# Patient Record
Sex: FEMALE | Race: WHITE | NOT HISPANIC OR LATINO | Employment: UNEMPLOYED | URBAN - NONMETROPOLITAN AREA
[De-identification: names, ages, dates, MRNs, and addresses within clinical notes are randomized per-mention and may not be internally consistent; named-entity substitution may affect disease eponyms.]

---

## 2017-08-22 ENCOUNTER — OFFICE VISIT (OUTPATIENT)
Dept: FAMILY MEDICINE CLINIC | Facility: CLINIC | Age: 47
End: 2017-08-22

## 2017-08-22 DIAGNOSIS — K21.9 GASTROESOPHAGEAL REFLUX DISEASE WITHOUT ESOPHAGITIS: ICD-10-CM

## 2017-08-22 DIAGNOSIS — M50.30 DDD (DEGENERATIVE DISC DISEASE), CERVICAL: ICD-10-CM

## 2017-08-22 DIAGNOSIS — Z00.00 HEALTHCARE MAINTENANCE: ICD-10-CM

## 2017-08-22 DIAGNOSIS — M77.11 LATERAL EPICONDYLITIS OF RIGHT ELBOW: ICD-10-CM

## 2017-08-22 DIAGNOSIS — J44.9 CHRONIC OBSTRUCTIVE PULMONARY DISEASE, UNSPECIFIED COPD TYPE (HCC): Primary | ICD-10-CM

## 2017-08-22 DIAGNOSIS — F17.200 SMOKER: ICD-10-CM

## 2017-08-22 DIAGNOSIS — E78.2 MIXED HYPERLIPIDEMIA: ICD-10-CM

## 2017-08-22 DIAGNOSIS — F41.9 ANXIETY: ICD-10-CM

## 2017-08-22 PROCEDURE — 99204 OFFICE O/P NEW MOD 45 MIN: CPT | Performed by: GENERAL PRACTICE

## 2017-08-22 RX ORDER — ALBUTEROL SULFATE 2.5 MG/3ML
2.5 SOLUTION RESPIRATORY (INHALATION) EVERY 4 HOURS PRN
Qty: 180 VIAL | Refills: 5 | Status: SHIPPED | OUTPATIENT
Start: 2017-08-22 | End: 2019-03-07 | Stop reason: SDUPTHER

## 2017-08-22 RX ORDER — FENOFIBRATE 160 MG/1
160 TABLET ORAL DAILY
COMMUNITY
End: 2017-08-22 | Stop reason: SDUPTHER

## 2017-08-22 RX ORDER — BUSPIRONE HYDROCHLORIDE 10 MG/1
TABLET ORAL
Refills: 0 | COMMUNITY
Start: 2017-06-30 | End: 2017-08-22 | Stop reason: SDUPTHER

## 2017-08-22 RX ORDER — ESOMEPRAZOLE MAGNESIUM 40 MG/1
40 CAPSULE, DELAYED RELEASE ORAL DAILY
Qty: 30 CAPSULE | Refills: 5 | Status: SHIPPED | OUTPATIENT
Start: 2017-08-22 | End: 2017-10-03

## 2017-08-22 RX ORDER — SIMVASTATIN 40 MG
40 TABLET ORAL NIGHTLY
Qty: 30 TABLET | Refills: 5 | Status: SHIPPED | OUTPATIENT
Start: 2017-08-22 | End: 2017-10-03

## 2017-08-22 RX ORDER — VENLAFAXINE 75 MG/1
75 TABLET ORAL DAILY
COMMUNITY
End: 2017-08-22 | Stop reason: SDUPTHER

## 2017-08-22 RX ORDER — CYCLOBENZAPRINE HCL 10 MG
10 TABLET ORAL 3 TIMES DAILY
Qty: 90 TABLET | Refills: 5 | Status: SHIPPED | OUTPATIENT
Start: 2017-08-22 | End: 2018-02-27 | Stop reason: SDUPTHER

## 2017-08-22 RX ORDER — FENOFIBRATE 160 MG/1
160 TABLET ORAL DAILY
Qty: 30 TABLET | Refills: 5 | Status: SHIPPED | OUTPATIENT
Start: 2017-08-22 | End: 2017-10-03

## 2017-08-22 RX ORDER — VENLAFAXINE 75 MG/1
75 TABLET ORAL DAILY
Qty: 30 TABLET | Refills: 5 | Status: SHIPPED | OUTPATIENT
Start: 2017-08-22 | End: 2017-10-03

## 2017-08-22 RX ORDER — GABAPENTIN 600 MG/1
600 TABLET ORAL 3 TIMES DAILY
COMMUNITY
End: 2017-08-22 | Stop reason: SDUPTHER

## 2017-08-22 RX ORDER — HYDROXYZINE PAMOATE 25 MG/1
25 CAPSULE ORAL 3 TIMES DAILY PRN
COMMUNITY
End: 2017-08-22 | Stop reason: SDUPTHER

## 2017-08-22 RX ORDER — SIMVASTATIN 40 MG
TABLET ORAL
Refills: 0 | COMMUNITY
Start: 2017-06-30 | End: 2017-08-22 | Stop reason: SDUPTHER

## 2017-08-22 RX ORDER — ALBUTEROL SULFATE 90 UG/1
2 AEROSOL, METERED RESPIRATORY (INHALATION) EVERY 4 HOURS PRN
COMMUNITY
End: 2017-08-22 | Stop reason: SDUPTHER

## 2017-08-22 RX ORDER — HYDROXYZINE PAMOATE 25 MG/1
25 CAPSULE ORAL 3 TIMES DAILY PRN
Qty: 90 CAPSULE | Refills: 5 | Status: SHIPPED | OUTPATIENT
Start: 2017-08-22 | End: 2018-02-27 | Stop reason: SDUPTHER

## 2017-08-22 RX ORDER — BUSPIRONE HYDROCHLORIDE 10 MG/1
10 TABLET ORAL 3 TIMES DAILY
Qty: 90 TABLET | Refills: 5 | Status: SHIPPED | OUTPATIENT
Start: 2017-08-22 | End: 2018-02-27 | Stop reason: SDUPTHER

## 2017-08-22 RX ORDER — PANTOPRAZOLE SODIUM 40 MG/1
TABLET, DELAYED RELEASE ORAL
Refills: 1 | COMMUNITY
Start: 2017-06-30 | End: 2017-08-22

## 2017-08-22 RX ORDER — ALBUTEROL SULFATE 90 UG/1
2 AEROSOL, METERED RESPIRATORY (INHALATION) EVERY 4 HOURS PRN
Qty: 18 G | Refills: 5 | Status: SHIPPED | OUTPATIENT
Start: 2017-08-22 | End: 2018-08-29 | Stop reason: SDUPTHER

## 2017-08-22 RX ORDER — CYCLOBENZAPRINE HCL 10 MG
TABLET ORAL
Refills: 0 | COMMUNITY
Start: 2017-06-30 | End: 2017-08-22 | Stop reason: SDUPTHER

## 2017-08-22 RX ORDER — GABAPENTIN 600 MG/1
600 TABLET ORAL 3 TIMES DAILY
Qty: 90 TABLET | Refills: 2 | Status: SHIPPED | OUTPATIENT
Start: 2017-08-22 | End: 2017-11-14 | Stop reason: SDUPTHER

## 2017-08-22 RX ORDER — ALBUTEROL SULFATE 2.5 MG/3ML
2.5 SOLUTION RESPIRATORY (INHALATION) 2 TIMES DAILY
COMMUNITY
End: 2017-08-22 | Stop reason: SDUPTHER

## 2017-08-23 VITALS
DIASTOLIC BLOOD PRESSURE: 70 MMHG | OXYGEN SATURATION: 96 % | SYSTOLIC BLOOD PRESSURE: 120 MMHG | WEIGHT: 196 LBS | BODY MASS INDEX: 33.46 KG/M2 | HEIGHT: 64 IN | RESPIRATION RATE: 12 BRPM | TEMPERATURE: 98.1 F | HEART RATE: 80 BPM

## 2017-08-23 PROBLEM — F17.200 SMOKER: Status: ACTIVE | Noted: 2017-08-23

## 2017-08-23 NOTE — PROGRESS NOTES
Deonte Acosta is a 47 y.o. female.     History of Present Illness     Presents today to establish care.     Right Elbow Pain  She gives a one week history of right elbow pain. She denies any strain or trauma however the pain started after she turned some heavy mattresses. The pain is described as a sharp discomfort over the lateral aspect with use. The pain does not radiate and has been unassociated with any other symptoms. She denies any stiffness or swelling and has had no apparent change in her strength or sensation. She is RHD and gives no history of any previous problems with that joint    Neck Pain  The patient has had chronic neck pain. Symptoms have been present for a number of years and are unchanged. The pain is located in the posterior neck bilaterally and radiates to the left posterior shoulder. The pain is described as sharp and stiffness and occurs intermittently. She rates her pain as moderate. Symptoms are exacerbated by any movement and prolonged posture. Symptoms are improved by gentle exercise/stretches, heat and cyclobenzaprine and gabapentin. She has tingling about the left shoulder associated with the neck pain. She denies any weakness in the right arm, weakness in the left arm, tingling in the right arm, change in bladder function, change in bowel function, urinary incontinence and bowel incontinence. MRI of the cervical spine performed on 6/29/15 was reported as showing DDD and OA    Anxiety  She has a history of the following anxiety symptoms: nervousness, worrying, insomnia, fatigue, feelings of losing control. Onset of symptoms was a number of years ago.  Symptoms have been intermittent since hen. She denies current suicidal and homicidal ideation. Risk factors: negative life event disappearance of her son Treatment has included medication venlafaxine, buspirone and hydroxyzine .   She complains of the following medication side effects: none. She is unconvinced that the  venlafaxine has added any benefit. She was previously tried on bupropion with little effect    COPD  The patient gives a history of SOB, cough and wheezing. She states that the symptoms occur during the day. She reports that her symptoms become worse with activity and exposure to cold air. Her symptoms are reduced with rest and with inhaled inhaled medication. The patient states she also has no other symptoms. She has had multiple admissions to hospital for apparent exacerbations associated with pneumonia. She is following the treatment plan, including medications as directed. She currently smokes approximately 1 packs per day.    Dyslipidemia  Compliance with treatment has been fair. The patient exercises intermittently. She is currently being prescribed the following medication for her dyslipidemia - simvastatin, fenofibrate. Patient denies side effects associated with her medications. She was unable to tolerate generic omega 3 FAs in the past. Her last labs were drawn about 6 months ago.    GERD  Patient complains of heartburn and bitter taste in mouth. Symptoms have been present for a number of years .The patient denies abdominal bloating, dysphagia, hematemesis, melena and unexpected weight loss. Symptoms appear to be worsened by large meals, lying down and fatty foods. Risk factors present for GERD include tobacco abuse, caffeine use and obesity. Risk factors absent for GERD are alcohol use and NSAID use. Studies performed so far include none. Treatments tried so far include proton pump inhibitor: pantoprazole. Results of treatment: some improvement in symptom severity. Previously she was on esomeprazole with near complete control of her symptoms    The following portions of the patient's history were reviewed and updated as appropriate: allergies, current medications, past family history, past medical history, past social history, past surgical history and problem list.    Review of Systems   Constitutional:  Positive for fatigue. Negative for appetite change, chills, fever and unexpected weight change.   HENT: Negative for congestion, ear pain, rhinorrhea, sneezing, sore throat and voice change.    Eyes: Negative for visual disturbance.   Respiratory: Positive for cough and shortness of breath. Negative for wheezing.    Cardiovascular: Negative for chest pain, palpitations and leg swelling.   Gastrointestinal: Negative for abdominal pain, blood in stool, constipation, diarrhea, nausea and vomiting.        Frequent heartburn   Endocrine: Negative for polydipsia.   Genitourinary: Negative for difficulty urinating, dysuria, frequency, hematuria, menstrual problem, pelvic pain, urgency, vaginal bleeding and vaginal discharge.   Musculoskeletal: Positive for arthralgias and neck pain. Negative for back pain, joint swelling and myalgias.   Skin: Negative for color change.   Neurological: Positive for numbness (left lateral neck and posterior shoulder). Negative for tremors, weakness and headaches.   Psychiatric/Behavioral: Positive for sleep disturbance. Negative for dysphoric mood and suicidal ideas. The patient is nervous/anxious.      Objective   Physical Exam   Constitutional: She is oriented to person, place, and time. No distress.   Appeared older then stated age. Bright and in good spirits however. No apparent distress. No pallor, jaundice, diaphoresis, or cyanosis.     HENT:   Head: Atraumatic.   Right Ear: Tympanic membrane, external ear and ear canal normal.   Left Ear: Tympanic membrane, external ear and ear canal normal.   Nose: Nose normal.   Mouth/Throat: Oropharynx is clear and moist. Mucous membranes are not pale and not cyanotic. Abnormal dentition (adenticulate).   Eyes: EOM are normal. Pupils are equal, round, and reactive to light. No scleral icterus.   Neck: No JVD present. Carotid bruit is not present. No tracheal deviation present. No thyromegaly present.   Cardiovascular: Normal rate, regular rhythm,  S1 normal, S2 normal and intact distal pulses.  Exam reveals no gallop, no S3 and no S4.    No murmur heard.  Pulmonary/Chest: No stridor. No respiratory distress. She has decreased breath sounds in the right lower field and the left lower field. She has wheezes (diffuse - mild). She has no rhonchi. She has no rales.   Mild pulmonary hyperinflation   Abdominal: Soft. Normal aorta and bowel sounds are normal. She exhibits no distension, no abdominal bruit and no mass. There is no hepatosplenomegaly. There is no tenderness. No hernia.   Musculoskeletal: She exhibits no deformity.        Right elbow: She exhibits normal range of motion, no swelling and no deformity. Tenderness found. Lateral epicondyle tenderness noted.        Cervical back: She exhibits decreased range of motion and tenderness (bilateral cervical paraspinal muscle tenderness). She exhibits no bony tenderness and no deformity.   Pain with extension and supination of the right wrist against resistance       Vascular Status -  Her exam exhibits right foot vasculature normal. Her exam exhibits no right foot edema. Her exam exhibits left foot vasculature normal. Her exam exhibits no left foot edema.  Lymphadenopathy:        Head (right side): No submandibular adenopathy present.        Head (left side): No submandibular adenopathy present.     She has no cervical adenopathy.   Neurological: She is alert and oriented to person, place, and time. She has normal reflexes. She displays normal reflexes. No cranial nerve deficit. She exhibits normal muscle tone. Coordination normal.   Skin: Skin is warm and dry. No rash noted. She is not diaphoretic. No cyanosis. No pallor. Nails show no clubbing.   Psychiatric: She has a normal mood and affect.     Assessment/Plan   Problems Addressed this Visit        Cardiovascular and Mediastinum    Mixed hyperlipidemia  Encouraged to continue to work on her diet and exercise plan.  Continue current medication for now.    Fasting labs scheduled  Will likely replace simvastatin with atorvastatin and fenofibrate with vascepa at her return    Relevant Medications    fenofibrate 160 MG tablet    simvastatin (ZOCOR) 40 MG tablet    Other Relevant Orders    Comprehensive Metabolic Panel    Lipid Panel    TSH       Respiratory    Chronic obstructive pulmonary disease   Advised of the importance of smoking cessation  Encouraged to remain as active as symptoms allow for    Relevant Medications    aclidinium bromide (TUDORZA PRESSAIR) 400 MCG/ACT aerosol powder  powder for inhalation    albuterol (PROVENTIL) (2.5 MG/3ML) 0.083% nebulizer solution    albuterol (VENTOLIN HFA) 108 (90 Base) MCG/ACT inhaler    mometasone-formoterol (DULERA 200) 200-5 MCG/ACT inhaler       Digestive    Gastroesophageal reflux disease without esophagitis  Symptoms are currently poorly controlled  Advised regarding lifestyle modification including: eating smaller meals, elevation of the head of bed at night, avoidance of caffeine, chocolate, nicotine and peppermint, and avoiding tight fitting clothing.  Will replace pantoprazole with esomeprazole    Relevant Medications    esomeprazole (nexIUM) 40 MG capsule    Other Relevant Orders    CBC & Differential       Musculoskeletal and Integument    Lateral epicondylitis of right elbow  Advised regarding rest and gentle exercises  Encouraged to report if any worse, any new symptoms, or if not resolving over the next month    DDD (degenerative disc disease), cervical  Continue current medication for now  Will discuss a TENS unit at her return and consider a trial of duloxetine in place of venlafaxine    Relevant Medications    cyclobenzaprine (FLEXERIL) 10 MG tablet    gabapentin (NEURONTIN) 600 MG tablet       Other    Anxiety  Significant situational component. Supportive therapy.   Will also consider clomipramine in the evening at her return    Relevant Medications    busPIRone (BUSPAR) 10 MG tablet    hydrOXYzine  (VISTARIL) 25 MG capsule    venlafaxine (EFFEXOR) 75 MG tablet    Other Relevant Orders    TSH    Healthcare maintenance  Due for a Tdap, prevnar, pneumovax, pap, and mammogram. Patient uninterested in any immunizations but willing to proceed with a pap with Mrs Aldana. Will discuss a mammogram at her return    Relevant Orders    Hepatitis C Antibody    Hepatitis B Surface Antigen    Hepatitis B Surface Antibody    Smoker  Brief discussion regarding smoking cessation. Will discuss further at her return

## 2017-09-14 ENCOUNTER — DOCUMENTATION (OUTPATIENT)
Dept: FAMILY MEDICINE CLINIC | Facility: CLINIC | Age: 47
End: 2017-09-14

## 2017-09-21 ENCOUNTER — OFFICE VISIT (OUTPATIENT)
Dept: FAMILY MEDICINE CLINIC | Facility: CLINIC | Age: 47
End: 2017-09-21

## 2017-09-21 VITALS
HEART RATE: 83 BPM | SYSTOLIC BLOOD PRESSURE: 130 MMHG | OXYGEN SATURATION: 95 % | DIASTOLIC BLOOD PRESSURE: 82 MMHG | HEIGHT: 64 IN | WEIGHT: 197 LBS | TEMPERATURE: 96.7 F | BODY MASS INDEX: 33.63 KG/M2

## 2017-09-21 DIAGNOSIS — Z01.419 GYNECOLOGIC EXAM NORMAL: ICD-10-CM

## 2017-09-21 DIAGNOSIS — E78.2 MIXED HYPERLIPIDEMIA: ICD-10-CM

## 2017-09-21 DIAGNOSIS — J44.1 COPD WITH EXACERBATION (HCC): Primary | ICD-10-CM

## 2017-09-21 DIAGNOSIS — K21.9 GASTROESOPHAGEAL REFLUX DISEASE WITHOUT ESOPHAGITIS: ICD-10-CM

## 2017-09-21 DIAGNOSIS — F17.200 TOBACCO USE DISORDER: ICD-10-CM

## 2017-09-21 DIAGNOSIS — F41.9 ANXIETY: ICD-10-CM

## 2017-09-21 LAB
ALBUMIN SERPL-MCNC: 4.1 G/DL (ref 3.5–5)
ALBUMIN/GLOB SERPL: 1.5 G/DL (ref 1.5–2.5)
ALP SERPL-CCNC: 67 U/L (ref 35–104)
ALT SERPL W P-5'-P-CCNC: 24 U/L (ref 10–36)
ANION GAP SERPL CALCULATED.3IONS-SCNC: 4.6 MMOL/L (ref 3.6–11.2)
AST SERPL-CCNC: 19 U/L (ref 10–30)
BASOPHILS # BLD AUTO: 0.02 10*3/MM3 (ref 0–0.3)
BASOPHILS NFR BLD AUTO: 0.3 % (ref 0–2)
BILIRUB SERPL-MCNC: 0.3 MG/DL (ref 0.2–1.8)
BUN BLD-MCNC: 13 MG/DL (ref 7–21)
BUN/CREAT SERPL: 9.8 (ref 7–25)
CALCIUM SPEC-SCNC: 9.2 MG/DL (ref 7.7–10)
CHLORIDE SERPL-SCNC: 108 MMOL/L (ref 99–112)
CHOLEST SERPL-MCNC: 184 MG/DL (ref 0–200)
CO2 SERPL-SCNC: 28.4 MMOL/L (ref 24.3–31.9)
CREAT BLD-MCNC: 1.32 MG/DL (ref 0.43–1.29)
DEPRECATED RDW RBC AUTO: 52.8 FL (ref 37–54)
EOSINOPHIL # BLD AUTO: 0.16 10*3/MM3 (ref 0–0.7)
EOSINOPHIL NFR BLD AUTO: 2.1 % (ref 0–5)
ERYTHROCYTE [DISTWIDTH] IN BLOOD BY AUTOMATED COUNT: 16.4 % (ref 11.5–14.5)
GFR SERPL CREATININE-BSD FRML MDRD: 43 ML/MIN/1.73
GLOBULIN UR ELPH-MCNC: 2.8 GM/DL
GLUCOSE BLD-MCNC: 106 MG/DL (ref 70–110)
HBV SURFACE AB SER RIA-ACNC: REACTIVE
HBV SURFACE AG SERPL QL IA: NORMAL
HCT VFR BLD AUTO: 42.7 % (ref 37–47)
HCV AB SER DONR QL: NORMAL
HDLC SERPL-MCNC: 47 MG/DL (ref 60–100)
HGB BLD-MCNC: 13.9 G/DL (ref 12–16)
IMM GRANULOCYTES # BLD: 0 10*3/MM3 (ref 0–0.03)
IMM GRANULOCYTES NFR BLD: 0 % (ref 0–0.5)
LDLC SERPL CALC-MCNC: 113 MG/DL (ref 0–100)
LDLC/HDLC SERPL: 2.4 {RATIO}
LYMPHOCYTES # BLD AUTO: 3.56 10*3/MM3 (ref 1–3)
LYMPHOCYTES NFR BLD AUTO: 46.7 % (ref 21–51)
MCH RBC QN AUTO: 29.1 PG (ref 27–33)
MCHC RBC AUTO-ENTMCNC: 32.6 G/DL (ref 33–37)
MCV RBC AUTO: 89.3 FL (ref 80–94)
MONOCYTES # BLD AUTO: 0.51 10*3/MM3 (ref 0.1–0.9)
MONOCYTES NFR BLD AUTO: 6.7 % (ref 0–10)
NEUTROPHILS # BLD AUTO: 3.38 10*3/MM3 (ref 1.4–6.5)
NEUTROPHILS NFR BLD AUTO: 44.2 % (ref 30–70)
OSMOLALITY SERPL CALC.SUM OF ELEC: 281.8 MOSM/KG (ref 273–305)
PLATELET # BLD AUTO: 344 10*3/MM3 (ref 130–400)
PMV BLD AUTO: 11.4 FL (ref 6–10)
POTASSIUM BLD-SCNC: 4 MMOL/L (ref 3.5–5.3)
PROT SERPL-MCNC: 6.9 G/DL (ref 6–8)
RBC # BLD AUTO: 4.78 10*6/MM3 (ref 4.2–5.4)
SODIUM BLD-SCNC: 141 MMOL/L (ref 135–153)
TRIGL SERPL-MCNC: 122 MG/DL (ref 0–150)
TSH SERPL DL<=0.05 MIU/L-ACNC: 3.63 MIU/ML (ref 0.55–4.78)
VLDLC SERPL-MCNC: 24.4 MG/DL
WBC NRBC COR # BLD: 7.63 10*3/MM3 (ref 4.5–12.5)

## 2017-09-21 PROCEDURE — 36415 COLL VENOUS BLD VENIPUNCTURE: CPT | Performed by: GENERAL PRACTICE

## 2017-09-21 PROCEDURE — 87340 HEPATITIS B SURFACE AG IA: CPT | Performed by: GENERAL PRACTICE

## 2017-09-21 PROCEDURE — 86803 HEPATITIS C AB TEST: CPT | Performed by: GENERAL PRACTICE

## 2017-09-21 PROCEDURE — 85025 COMPLETE CBC W/AUTO DIFF WBC: CPT | Performed by: GENERAL PRACTICE

## 2017-09-21 PROCEDURE — 99406 BEHAV CHNG SMOKING 3-10 MIN: CPT | Performed by: PHYSICIAN ASSISTANT

## 2017-09-21 PROCEDURE — 80053 COMPREHEN METABOLIC PANEL: CPT | Performed by: GENERAL PRACTICE

## 2017-09-21 PROCEDURE — 99214 OFFICE O/P EST MOD 30 MIN: CPT | Performed by: PHYSICIAN ASSISTANT

## 2017-09-21 PROCEDURE — 86706 HEP B SURFACE ANTIBODY: CPT | Performed by: GENERAL PRACTICE

## 2017-09-21 PROCEDURE — 84443 ASSAY THYROID STIM HORMONE: CPT | Performed by: GENERAL PRACTICE

## 2017-09-21 PROCEDURE — 80061 LIPID PANEL: CPT | Performed by: GENERAL PRACTICE

## 2017-09-21 RX ORDER — PREDNISONE 20 MG/1
TABLET ORAL
Qty: 14 TABLET | Refills: 0 | Status: SHIPPED | OUTPATIENT
Start: 2017-09-21 | End: 2017-10-01

## 2017-09-21 RX ORDER — DOXYCYCLINE HYCLATE 100 MG/1
100 TABLET, DELAYED RELEASE ORAL 2 TIMES DAILY
Qty: 20 TABLET | Refills: 0 | Status: SHIPPED | OUTPATIENT
Start: 2017-09-21 | End: 2017-10-03

## 2017-09-21 NOTE — PROGRESS NOTES
Deonte Acosta is a 47 y.o. female.     History of Present Illness     Acute illness-  She complains of chest congestion, productive cough with thick yellow sputum and sore throat for the past 3 days.  Minimal relief with over-the-counter cough syrup.    COPD-not at goal due to acute exacerbation.    Tobacco use disorder-she smokes 1/2-1 pack per day for 30+ years.  She is not interested in smoking cessation at this time.    Gastroesophageal reflux disease-not at goal but she is currently trying to get Nexium prior authorize    Dyslipidemia  Compliance with treatment has been good. The patient exercises intermittently. She is currently being prescribed the following medication for her dyslipidemia - simvastatin. Patient denies side effects associated with her medications. Most recent lipids include  No results found for: TRIG, HDL, LDLCALC, LDL  Stable    Anxiety-stable    GYN exam-  She is also here today for her Pap smear and annual breast exam.  She states that she does do monthly self breast exams at home and has not found any masses or irregularities to report.  She states that her last Pap smear was over 20 years ago.    The following portions of the patient's history were reviewed and updated as appropriate: allergies, current medications, past family history, past medical history, past social history, past surgical history and problem list.    Review of Systems   Constitutional: Positive for fatigue. Negative for activity change, appetite change and fever.   HENT: Positive for congestion. Negative for ear pain, sinus pressure and sore throat.    Eyes: Negative for pain and visual disturbance.   Respiratory: Positive for cough and wheezing. Negative for chest tightness.    Cardiovascular: Negative for chest pain and palpitations.   Gastrointestinal: Negative for abdominal pain, constipation, diarrhea, nausea and vomiting.   Endocrine: Negative for polydipsia and polyuria.   Genitourinary:  Negative for dysuria and frequency.   Musculoskeletal: Negative for back pain and myalgias.   Skin: Negative for color change and rash.   Allergic/Immunologic: Negative for food allergies and immunocompromised state.   Neurological: Negative for dizziness, syncope and headaches.   Hematological: Negative for adenopathy. Does not bruise/bleed easily.   Psychiatric/Behavioral: Negative for hallucinations and suicidal ideas. The patient is not nervous/anxious.        Objective   Physical Exam   Constitutional: She is oriented to person, place, and time. She appears well-developed and well-nourished.   HENT:   Head: Normocephalic and atraumatic.   Nose: Nose normal.   Mouth/Throat: Oropharynx is clear and moist.   Eyes: Conjunctivae and EOM are normal. Pupils are equal, round, and reactive to light.   Neck: Normal range of motion. Neck supple. No tracheal deviation present. No thyromegaly present.   Cardiovascular: Normal rate, regular rhythm, normal heart sounds and intact distal pulses.    No murmur heard.  Pulmonary/Chest: Effort normal. No respiratory distress. She has wheezes (bilaterally diffuse). Right breast exhibits no inverted nipple, no mass, no nipple discharge and no tenderness. Left breast exhibits no inverted nipple, no mass, no nipple discharge and no tenderness. There is no breast swelling.   Abdominal: Soft. Bowel sounds are normal. There is no tenderness. There is no guarding.   Genitourinary: Rectum normal and vagina normal. Pelvic exam was performed with patient supine. There is no rash, tenderness or lesion on the right labia. There is no rash, tenderness or lesion on the left labia. Uterus is deviated (retrograde position). Cervix exhibits no motion tenderness and no discharge. Right adnexum displays no mass and no tenderness. Left adnexum displays no mass and no tenderness. No erythema or tenderness in the vagina. No vaginal discharge found.   Musculoskeletal: Normal range of motion. She exhibits  no edema or tenderness.   Lymphadenopathy:     She has no cervical adenopathy.   Neurological: She is alert and oriented to person, place, and time.   Skin: Skin is warm and dry. No rash noted.   Psychiatric: She has a normal mood and affect. Her behavior is normal.   Nursing note and vitals reviewed.      Assessment/Plan   Diagnoses and all orders for this visit:    COPD with exacerbation  -     doxycycline (DORYX) 100 MG enteric coated tablet; Take 1 tablet by mouth 2 (Two) Times a Day.  -     predniSONE (DELTASONE) 20 MG tablet; 2 daily    Gastroesophageal reflux disease without esophagitis  -     CBC & Differential  -     CBC Auto Differential    Mixed hyperlipidemia  -     Comprehensive Metabolic Panel  -     Lipid Panel  -     TSH    Anxiety  -     TSH    Gynecologic exam normal  Comments:  Pap smear and breast exam were performed today.  Pap smear will be sent for pathology.    Tobacco use disorder  Comments:  She was counseled on smoking cessation for 3 minutes today including benefits to her health with regards to COPD and options for cessation.

## 2017-10-03 ENCOUNTER — OFFICE VISIT (OUTPATIENT)
Dept: FAMILY MEDICINE CLINIC | Facility: CLINIC | Age: 47
End: 2017-10-03

## 2017-10-03 DIAGNOSIS — E78.2 MIXED HYPERLIPIDEMIA: Primary | ICD-10-CM

## 2017-10-03 DIAGNOSIS — M50.30 DDD (DEGENERATIVE DISC DISEASE), CERVICAL: ICD-10-CM

## 2017-10-03 DIAGNOSIS — F17.200 SMOKER: ICD-10-CM

## 2017-10-03 DIAGNOSIS — M77.11 LATERAL EPICONDYLITIS OF RIGHT ELBOW: ICD-10-CM

## 2017-10-03 DIAGNOSIS — K21.9 GASTROESOPHAGEAL REFLUX DISEASE WITHOUT ESOPHAGITIS: ICD-10-CM

## 2017-10-03 DIAGNOSIS — Z12.31 ENCOUNTER FOR SCREENING MAMMOGRAM FOR BREAST CANCER: ICD-10-CM

## 2017-10-03 DIAGNOSIS — J44.9 CHRONIC OBSTRUCTIVE PULMONARY DISEASE, UNSPECIFIED COPD TYPE (HCC): ICD-10-CM

## 2017-10-03 DIAGNOSIS — F41.9 CHRONIC ANXIETY: ICD-10-CM

## 2017-10-03 DIAGNOSIS — Z00.00 HEALTHCARE MAINTENANCE: ICD-10-CM

## 2017-10-03 PROCEDURE — 99214 OFFICE O/P EST MOD 30 MIN: CPT | Performed by: GENERAL PRACTICE

## 2017-10-03 RX ORDER — MONTELUKAST SODIUM 10 MG/1
10 TABLET ORAL DAILY
Qty: 30 TABLET | Refills: 5 | Status: SHIPPED | OUTPATIENT
Start: 2017-10-03 | End: 2018-03-28 | Stop reason: SDUPTHER

## 2017-10-03 RX ORDER — OMEGA-3 FATTY ACIDS/FISH OIL 300-1000MG
CAPSULE ORAL
Qty: 120 EACH | Refills: 5 | Status: SHIPPED | OUTPATIENT
Start: 2017-10-03 | End: 2017-11-14 | Stop reason: SDUPTHER

## 2017-10-03 RX ORDER — DULOXETIN HYDROCHLORIDE 30 MG/1
30 CAPSULE, DELAYED RELEASE ORAL DAILY
Qty: 30 CAPSULE | Refills: 5 | Status: SHIPPED | OUTPATIENT
Start: 2017-10-03 | End: 2018-03-28 | Stop reason: SDUPTHER

## 2017-10-03 RX ORDER — LANSOPRAZOLE 30 MG/1
30 CAPSULE, DELAYED RELEASE ORAL DAILY
Qty: 30 CAPSULE | Refills: 5 | Status: SHIPPED | OUTPATIENT
Start: 2017-10-03 | End: 2017-11-14 | Stop reason: SDUPTHER

## 2017-10-03 RX ORDER — SULFAMETHOXAZOLE AND TRIMETHOPRIM 800; 160 MG/1; MG/1
1 TABLET ORAL 2 TIMES DAILY
Qty: 20 TABLET | Refills: 0 | Status: SHIPPED | OUTPATIENT
Start: 2017-10-03 | End: 2017-10-13

## 2017-10-03 RX ORDER — ATORVASTATIN CALCIUM 40 MG/1
40 TABLET, FILM COATED ORAL NIGHTLY
Qty: 30 TABLET | Refills: 5 | Status: SHIPPED | OUTPATIENT
Start: 2017-10-03 | End: 2018-03-28 | Stop reason: SDUPTHER

## 2017-10-03 NOTE — PROGRESS NOTES
Deonte Acosta is a 47 y.o. female.     History of Present Illness     COPD Exacerbation  Current symptoms include acute on chronic dyspnea, cough productive of yellow sputum in moderate amounts and wheezing. Symptoms have been present since 3 weeks ago and have been unchanged. She denies chest pain or hemoptysis. Associated symptoms include itchy runny eyes, sneezing, nasal congestion, and a sore throat. There's no history of any other upper respiratory tract symptoms and she denies any fever or chills.  This episode appears to have been triggered by upper respiratory infection and allergens. Treatments tried for the current exacerbation: couse of doxycycline and prednisone. The patient has been having similar episodes for a number of years. She continues to smoke    GERD  Patient complains of heartburn and bitter taste in mouth. Symptoms have been present for a number of years .The patient denies abdominal bloating, dysphagia, hematemesis, melena and unexpected weight loss. Symptoms appear to be worsened by large meals, lying down and fatty foods. Risk factors present for GERD include tobacco abuse, caffeine use and obesity. Risk factors absent for GERD are alcohol use and NSAID use. Studies performed so far include none. Treatments tried so far include proton pump inhibitor: pantoprazole. Results of treatment: some improvement in symptom severity. Previously she was on esomeprazole with near complete control of her symptoms however her insurance does not cover it    Right Elbow Pain  She continues to have right elbow pain as previously described. She denies any strain or trauma however the pain started after she turned some heavy mattresses. The pain is described as a sharp discomfort over the lateral aspect with use. The pain does not radiate and has been unassociated with any other symptoms. She denies any stiffness or swelling and has had no apparent change in her strength or sensation. She is RHD  and gives no history of any previous problems with that joint    Neck Pain  The patient has had chronic neck pain. Symptoms have been present for a number of years and are unchanged. The pain is located in the posterior neck bilaterally and radiates to the left posterior shoulder. The pain is described as sharp and stiffness and occurs intermittently. She rates her pain as moderate. Symptoms are exacerbated by any movement and prolonged posture. Symptoms are improved by gentle exercise/stretches, heat and cyclobenzaprine and gabapentin. She has tingling about the left shoulder associated with the neck pain. She denies any weakness in the right arm, weakness in the left arm, tingling in the right arm, change in bladder function, change in bowel function, urinary incontinence and bowel incontinence. MRI of the cervical spine performed on 6/29/15 was reported as showing DDD and OA    Anxiety  She has a history of the following anxiety symptoms: nervousness, worrying, insomnia, fatigue, feelings of losing control. Onset of symptoms was a number of years ago.  Symptoms have been intermittent since lthen. She denies current suicidal and homicidal ideation. Risk factors: negative life event disappearance of her son Treatment has included medication venlafaxine, buspirone and hydroxyzine .   She complains of the following medication side effects: none. She is unconvinced that the venlafaxine has added any benefit. She was previously tried on bupropion with little effect    Dyslipidemia  Compliance with treatment has been fair. The patient exercises occasionally. She is currently being prescribed the following medication for her dyslipidemia - simvastatin, fenofibrate. Patient denies side effects associated with her medications. She burped a fishy taste after every dose of omega 3 FAs in the past. Most recent lipids include  Lab Results   Component Value Date    TRIG 122 09/21/2017    HDL 47 (L) 09/21/2017    LDLCALC 113 (H)  09/21/2017     Labs  Most recent Cr 1.32. Hep B S Ab positive however Ag negative. Patient believes she might have been vaccinated in the past    The following portions of the patient's history were reviewed and updated as appropriate: allergies, current medications, past medical history, past social history and problem list.    Review of Systems   Constitutional: Positive for fatigue. Negative for appetite change, chills, fever and unexpected weight change.   HENT: Positive for congestion, rhinorrhea, sneezing and sore throat. Negative for ear pain, postnasal drip and voice change.    Eyes: Positive for itching. Negative for visual disturbance.   Respiratory: Positive for cough, shortness of breath and wheezing.    Cardiovascular: Negative for chest pain, palpitations and leg swelling.   Gastrointestinal: Negative for abdominal pain, blood in stool, constipation, diarrhea, nausea and vomiting.        Frequent heartburn   Endocrine: Negative for polydipsia.   Genitourinary: Negative for difficulty urinating, dysuria, frequency, hematuria, menstrual problem, pelvic pain, urgency, vaginal bleeding and vaginal discharge.   Musculoskeletal: Positive for arthralgias and neck pain. Negative for back pain, joint swelling and myalgias.   Skin: Negative for color change.   Neurological: Positive for numbness (left lateral neck and posterior shoulder). Negative for tremors, weakness and headaches.   Psychiatric/Behavioral: Positive for sleep disturbance. Negative for dysphoric mood and suicidal ideas. The patient is nervous/anxious.      Objective   Physical Exam   Constitutional: She is oriented to person, place, and time. No distress.   Appeared older then stated age. Bright and in good spirits however. No apparent distress. No pallor, jaundice, diaphoresis, or cyanosis.     HENT:   Head: Atraumatic.   Right Ear: Tympanic membrane, external ear and ear canal normal.   Left Ear: Tympanic membrane, external ear and ear canal  normal.   Nose: Nose normal.   Mouth/Throat: Oropharynx is clear and moist. Mucous membranes are not pale and not cyanotic. Abnormal dentition (adenticulate).   Eyes: EOM are normal. Pupils are equal, round, and reactive to light. No scleral icterus.   Neck: No JVD present. Carotid bruit is not present. No tracheal deviation present. No thyromegaly present.   Cardiovascular: Normal rate, regular rhythm, S1 normal, S2 normal and intact distal pulses.  Exam reveals no gallop, no S3 and no S4.    No murmur heard.  Pulmonary/Chest: No stridor. No respiratory distress. She has decreased breath sounds (diffuse). She has wheezes (diffuse - moderate). She has no rhonchi. She has no rales.   Mild pulmonary hyperinflation   Abdominal: Soft. Normal aorta and bowel sounds are normal. She exhibits no distension, no abdominal bruit and no mass. There is no hepatosplenomegaly. There is no tenderness. No hernia.   Musculoskeletal: She exhibits no deformity.        Right elbow: She exhibits normal range of motion, no swelling and no deformity. Tenderness found. Lateral epicondyle tenderness noted.        Cervical back: She exhibits decreased range of motion and tenderness (bilateral cervical paraspinal muscle tenderness). She exhibits no bony tenderness and no deformity.   Pain with extension and supination of the right wrist against resistance       Vascular Status -  Her exam exhibits right foot vasculature normal. Her exam exhibits no right foot edema. Her exam exhibits left foot vasculature normal. Her exam exhibits no left foot edema.  Lymphadenopathy:        Head (right side): No submandibular adenopathy present.        Head (left side): No submandibular adenopathy present.     She has no cervical adenopathy.   Neurological: She is alert and oriented to person, place, and time. She has normal reflexes. She displays normal reflexes. No cranial nerve deficit. She exhibits normal muscle tone. Coordination normal.   Skin: Skin is  warm and dry. No rash noted. She is not diaphoretic. No cyanosis. No pallor. Nails show no clubbing.   Psychiatric: She has a normal mood and affect.     Assessment/Plan   Problems Addressed this Visit        Cardiovascular and Mediastinum    Mixed hyperlipidemia  Encouraged to continue to work on her diet and exercise plan.  Simvastatin and fenofibrate will be replaced with atorvastatin and omega 3 FAs. Advised to take the latter frozen.  Will arrange updated labs at her return    Relevant Medications    aspirin 81 MG tablet    atorvastatin (LIPITOR) 40 MG tablet    Omega 3 1000 MG capsule       Respiratory    Chronic obstructive pulmonary disease  With recent exacerbation  Reminded of the importance of smoking cessation  Will start on montelukast, a long tapering course of prednisone, and a course of bactrim  CXR arranged  Encouraged to report if any worse, any new symptoms, or if not resolving over the next week    Relevant Medications    montelukast (SINGULAIR) 10 MG tablet    sulfamethoxazole-trimethoprim (BACTRIM DS,SEPTRA DS) 800-160 MG per tablet    Other Relevant Orders    XR Chest 2 View       Digestive    Gastroesophageal reflux disease without esophagitis  Symptoms are currently inadequately controlled  Reminded regarding lifestyle modification.  Trial of lansoprazole in place of pantoprazole    Relevant Medications    lansoprazole (PREVACID) 30 MG capsule       Musculoskeletal and Integument    Lateral epicondylitis of right elbow  Reminded regarding rest, gentle exercise, ice and heat.  Prescription written for a splint  Encouraged to report if any worse, any new symptoms, or if not resolving over the next month    DDD (degenerative disc disease), cervical    Relevant Medications    DULoxetine (CYMBALTA) 30 MG capsule       Other    Chronic anxiety  Supportive therapy.   Trial of duloxetine in place of venlafaxine    Relevant Medications    DULoxetine (CYMBALTA) 30 MG capsule    Healthcare  maintenance  Recommended a flu shot when available. Patient remains uninterested in this or in any other immunizations  Will arrange an updated mammogram    Relevant Orders    Mammo Screening Digital Tomosynthesis Bilateral With CAD    Smoker    Encounter for screening mammogram for breast cancer    Relevant Orders    Mammo Screening Digital Tomosynthesis Bilateral With CAD

## 2017-10-04 VITALS
OXYGEN SATURATION: 97 % | HEIGHT: 64 IN | WEIGHT: 202 LBS | SYSTOLIC BLOOD PRESSURE: 125 MMHG | BODY MASS INDEX: 34.49 KG/M2 | RESPIRATION RATE: 12 BRPM | HEART RATE: 84 BPM | TEMPERATURE: 98.3 F | DIASTOLIC BLOOD PRESSURE: 75 MMHG

## 2017-11-14 ENCOUNTER — OFFICE VISIT (OUTPATIENT)
Dept: FAMILY MEDICINE CLINIC | Facility: CLINIC | Age: 47
End: 2017-11-14

## 2017-11-14 DIAGNOSIS — F41.9 CHRONIC ANXIETY: ICD-10-CM

## 2017-11-14 DIAGNOSIS — M50.30 DDD (DEGENERATIVE DISC DISEASE), CERVICAL: ICD-10-CM

## 2017-11-14 DIAGNOSIS — F17.200 SMOKER: ICD-10-CM

## 2017-11-14 DIAGNOSIS — J44.9 CHRONIC OBSTRUCTIVE PULMONARY DISEASE, UNSPECIFIED COPD TYPE (HCC): ICD-10-CM

## 2017-11-14 DIAGNOSIS — Z00.00 HEALTHCARE MAINTENANCE: ICD-10-CM

## 2017-11-14 DIAGNOSIS — K21.9 GASTROESOPHAGEAL REFLUX DISEASE WITHOUT ESOPHAGITIS: ICD-10-CM

## 2017-11-14 DIAGNOSIS — E78.2 MIXED HYPERLIPIDEMIA: Primary | ICD-10-CM

## 2017-11-14 PROCEDURE — 99214 OFFICE O/P EST MOD 30 MIN: CPT | Performed by: GENERAL PRACTICE

## 2017-11-14 RX ORDER — LANSOPRAZOLE 30 MG/1
30 CAPSULE, DELAYED RELEASE ORAL 2 TIMES DAILY
Qty: 60 CAPSULE | Refills: 5 | Status: SHIPPED | OUTPATIENT
Start: 2017-11-14 | End: 2018-09-27 | Stop reason: SDUPTHER

## 2017-11-14 RX ORDER — PREDNISONE 20 MG/1
TABLET ORAL
Qty: 20 TABLET | Refills: 0 | Status: SHIPPED | OUTPATIENT
Start: 2017-11-14 | End: 2017-11-24

## 2017-11-14 RX ORDER — SULFAMETHOXAZOLE AND TRIMETHOPRIM 800; 160 MG/1; MG/1
1 TABLET ORAL 2 TIMES DAILY
Qty: 20 TABLET | Refills: 0 | Status: SHIPPED | OUTPATIENT
Start: 2017-11-14 | End: 2017-11-24

## 2017-11-14 RX ORDER — GABAPENTIN 600 MG/1
600 TABLET ORAL 3 TIMES DAILY
Qty: 90 TABLET | Refills: 2 | Status: SHIPPED | OUTPATIENT
Start: 2017-11-14 | End: 2018-02-12 | Stop reason: SDUPTHER

## 2017-11-14 RX ORDER — OMEGA-3 FATTY ACIDS/FISH OIL 300-1000MG
CAPSULE ORAL
Qty: 120 EACH | Refills: 5 | Status: SHIPPED | OUTPATIENT
Start: 2017-11-14 | End: 2018-08-20

## 2017-11-14 NOTE — PROGRESS NOTES
Deonte Acosta is a 47 y.o. female.     History of Present Illness     COPD Exacerbation  Current symptoms include acute on chronic dyspnea, cough productive of whitish yellow sputum in moderate amounts and wheezing. Symptoms have been present for the last 4-5 days and have been gradually worsening. She denies chest pain or hemoptysis. Associated symptoms include itchy runny eyes, sneezing, nasal congestion, and a sore throat. There's no history of any other upper respiratory tract symptoms and she denies any fever or chills.  This episode appears to have been triggered by upper respiratory infection and allergens. She recently moved to a new home and has been around a lot of dust.The patient has been having similar episodes for a number of years and completed a course of antibiotics and corticosteroids several weeks ago for another episode. She continues to smoke    GERD  Patient complains of heartburn and bitter taste in mouth. Symptoms have been present for a number of years .The patient denies abdominal bloating, dysphagia, hematemesis, melena and unexpected weight loss. Symptoms appear to be worsened by large meals, lying down and fatty foods. Risk factors present for GERD include tobacco abuse, caffeine use and obesity. Risk factors absent for GERD are alcohol use and NSAID use. Studies performed so far include none. Treatments tried so far include proton pump inhibitor: lansoprazole. Results of treatment: good control of her symptoms but only if she takes it twice daily    Neck Pain  The patient has had chronic neck pain. Symptoms have been present for a number of years and are unchanged. The pain is located in the posterior neck bilaterally and radiates to the left posterior shoulder. The pain is described as sharp and stiffness and occurs intermittently. She rates her pain as moderate. Symptoms are exacerbated by any movement and prolonged posture. Symptoms are improved by gentle  exercise/stretches, heat and cyclobenzaprine and gabapentin. She has tingling about the left shoulder associated with the neck pain. She denies any weakness in the right arm, weakness in the left arm, tingling in the right arm, change in bladder function, change in bowel function, urinary incontinence and bowel incontinence. MRI of the cervical spine performed on 6/29/15 was reported as showing DDD and OA    Anxiety  She has a history of the following anxiety symptoms: nervousness, worrying, insomnia, fatigue, feelings of losing control. Onset of symptoms was a number of years ago.  Symptoms have been intermittent since lthen. She denies current suicidal and homicidal ideation. Risk factors: negative life event disappearance of her son Treatment has included medication duloxetine, buspirone and hydroxyzine .   She denies any apparent side effects    Dyslipidemia  Compliance with treatment has been fair. The patient exercises intermittently. She is currently being prescribed the following medication for her dyslipidemia - atorvastatin, omega 3 fatty acids. She has yet to receive the latter from her pharm. Patient denies side effects associated with her medications.     The following portions of the patient's history were reviewed and updated as appropriate: allergies, current medications, past medical history, past social history and problem list.    Review of Systems   Constitutional: Positive for fatigue. Negative for appetite change, chills, fever and unexpected weight change.   HENT: Positive for congestion, rhinorrhea, sneezing and sore throat. Negative for ear pain, postnasal drip and voice change.    Eyes: Negative for itching and visual disturbance.   Respiratory: Positive for cough, shortness of breath and wheezing.    Cardiovascular: Negative for chest pain, palpitations and leg swelling.   Gastrointestinal: Negative for abdominal pain, blood in stool, constipation, diarrhea, nausea and vomiting.         Intermittent heartburn   Endocrine: Negative for polydipsia.   Genitourinary: Negative for difficulty urinating, dysuria, frequency, hematuria, menstrual problem, pelvic pain, urgency, vaginal bleeding and vaginal discharge.   Musculoskeletal: Positive for arthralgias and neck pain. Negative for back pain, joint swelling and myalgias.   Skin: Negative for color change.   Neurological: Positive for numbness (left lateral neck and posterior shoulder). Negative for tremors, weakness and headaches.   Psychiatric/Behavioral: Positive for sleep disturbance. Negative for dysphoric mood and suicidal ideas. The patient is nervous/anxious.      Objective   Physical Exam   Constitutional: She is oriented to person, place, and time. No distress.   Appeared older then stated age. Bright and in good spirits however. No apparent distress. No pallor, jaundice, diaphoresis, or cyanosis.     HENT:   Head: Atraumatic.   Right Ear: Tympanic membrane, external ear and ear canal normal.   Left Ear: Tympanic membrane, external ear and ear canal normal.   Nose: Nose normal.   Mouth/Throat: Oropharynx is clear and moist. Mucous membranes are not pale and not cyanotic. Abnormal dentition (adenticulate).   Eyes: EOM are normal. Pupils are equal, round, and reactive to light. No scleral icterus.   Neck: No JVD present. Carotid bruit is not present. No tracheal deviation present. No thyromegaly present.   Cardiovascular: Normal rate, regular rhythm, S1 normal, S2 normal and intact distal pulses.  Exam reveals no gallop, no S3 and no S4.    No murmur heard.  Pulmonary/Chest: No stridor. No respiratory distress. She has decreased breath sounds (diffuse). She has wheezes (diffuse - moderate). She has no rhonchi. She has no rales.   Mild pulmonary hyperinflation   Abdominal: Soft. Normal aorta and bowel sounds are normal. She exhibits no distension, no abdominal bruit and no mass. There is no hepatosplenomegaly. There is no tenderness. No hernia.    Musculoskeletal: She exhibits no deformity.       Vascular Status -  Her exam exhibits right foot vasculature normal. Her exam exhibits no right foot edema. Her exam exhibits left foot vasculature normal. Her exam exhibits no left foot edema.  Lymphadenopathy:        Head (right side): No submandibular adenopathy present.        Head (left side): No submandibular adenopathy present.     She has no cervical adenopathy.   Neurological: She is alert and oriented to person, place, and time. She has normal reflexes. She displays normal reflexes. No cranial nerve deficit. She exhibits normal muscle tone. Coordination normal.   Skin: Skin is warm and dry. No rash noted. She is not diaphoretic. No cyanosis. No pallor. Nails show no clubbing.   Psychiatric: She has a normal mood and affect.     Assessment/Plan   Problems Addressed this Visit        Cardiovascular and Mediastinum    Mixed hyperlipidemia   Encouraged to continue to work on her diet and exercise plan.  Patient will report if she is unable to start on omega 3 FAs or if she experiences any side effects  Scheduled for updated lab work in several months    Relevant Medications    Omega 3 1000 MG capsule    Other Relevant Orders    Comprehensive Metabolic Panel    Lipid Panel       Respiratory    Chronic obstructive pulmonary disease  With another exacerbation  Lengthy discussion regarding the importance of smoking cessation  Will start back on a course of antibiotics and oral corticosteroids  Encouraged to follow through with the CXR arranged at her last visit  Instructed to report if any worse, any new symptoms, or if not resolving over the next week    Relevant Medications    predniSONE (DELTASONE) 20 MG tablet    sulfamethoxazole-trimethoprim (BACTRIM DS,SEPTRA DS) 800-160 MG per tablet       Digestive    Gastroesophageal reflux disease without esophagitis  Symptoms are unchanged  Reminded regarding lifestyle modification.  Will continue bid lansoprazole     Relevant Medications    lansoprazole (PREVACID) 30 MG capsule    Other Relevant Orders    CBC & Differential       Musculoskeletal and Integument    DDD (degenerative disc disease), cervical    Relevant Medications    gabapentin (NEURONTIN) 600 MG tablet       Other    Chronic anxiety  Stable. Supportive therapy.   Continue current medication.    Healthcare maintenance  Encouraged to follow through with her mammogram and to get a flu shot wherever available    Smoker

## 2017-11-15 VITALS
OXYGEN SATURATION: 96 % | DIASTOLIC BLOOD PRESSURE: 80 MMHG | HEIGHT: 64 IN | BODY MASS INDEX: 35.85 KG/M2 | WEIGHT: 210 LBS | SYSTOLIC BLOOD PRESSURE: 120 MMHG | TEMPERATURE: 98.1 F | RESPIRATION RATE: 12 BRPM | HEART RATE: 100 BPM

## 2018-02-12 ENCOUNTER — OFFICE VISIT (OUTPATIENT)
Dept: FAMILY MEDICINE CLINIC | Facility: CLINIC | Age: 48
End: 2018-02-12

## 2018-02-12 VITALS
DIASTOLIC BLOOD PRESSURE: 70 MMHG | HEART RATE: 100 BPM | WEIGHT: 223 LBS | HEIGHT: 64 IN | BODY MASS INDEX: 38.07 KG/M2 | TEMPERATURE: 98.3 F | OXYGEN SATURATION: 93 % | RESPIRATION RATE: 12 BRPM | SYSTOLIC BLOOD PRESSURE: 125 MMHG

## 2018-02-12 DIAGNOSIS — J44.9 CHRONIC OBSTRUCTIVE PULMONARY DISEASE, UNSPECIFIED COPD TYPE (HCC): ICD-10-CM

## 2018-02-12 DIAGNOSIS — K21.9 GASTROESOPHAGEAL REFLUX DISEASE WITHOUT ESOPHAGITIS: ICD-10-CM

## 2018-02-12 DIAGNOSIS — E78.2 MIXED HYPERLIPIDEMIA: Primary | ICD-10-CM

## 2018-02-12 DIAGNOSIS — F17.200 SMOKER: ICD-10-CM

## 2018-02-12 DIAGNOSIS — J30.89 CHRONIC NON-SEASONAL ALLERGIC RHINITIS, UNSPECIFIED TRIGGER: ICD-10-CM

## 2018-02-12 DIAGNOSIS — Z00.00 HEALTHCARE MAINTENANCE: ICD-10-CM

## 2018-02-12 DIAGNOSIS — F41.9 CHRONIC ANXIETY: ICD-10-CM

## 2018-02-12 DIAGNOSIS — M50.30 DDD (DEGENERATIVE DISC DISEASE), CERVICAL: ICD-10-CM

## 2018-02-12 LAB
ALBUMIN SERPL-MCNC: 4.3 G/DL (ref 3.5–5)
ALBUMIN/GLOB SERPL: 1.5 G/DL (ref 1.5–2.5)
ALP SERPL-CCNC: 107 U/L (ref 35–104)
ALT SERPL W P-5'-P-CCNC: 37 U/L (ref 10–36)
ANION GAP SERPL CALCULATED.3IONS-SCNC: 7.2 MMOL/L (ref 3.6–11.2)
AST SERPL-CCNC: 30 U/L (ref 10–30)
BASOPHILS # BLD AUTO: 0.02 10*3/MM3 (ref 0–0.3)
BASOPHILS NFR BLD AUTO: 0.3 % (ref 0–2)
BILIRUB SERPL-MCNC: 0.5 MG/DL (ref 0.2–1.8)
BUN BLD-MCNC: 9 MG/DL (ref 7–21)
BUN/CREAT SERPL: 9.1 (ref 7–25)
CALCIUM SPEC-SCNC: 9.2 MG/DL (ref 7.7–10)
CHLORIDE SERPL-SCNC: 107 MMOL/L (ref 99–112)
CHOLEST SERPL-MCNC: 172 MG/DL (ref 0–200)
CO2 SERPL-SCNC: 28.8 MMOL/L (ref 24.3–31.9)
CREAT BLD-MCNC: 0.99 MG/DL (ref 0.43–1.29)
DEPRECATED RDW RBC AUTO: 51.9 FL (ref 37–54)
EOSINOPHIL # BLD AUTO: 0.17 10*3/MM3 (ref 0–0.7)
EOSINOPHIL NFR BLD AUTO: 2.5 % (ref 0–5)
ERYTHROCYTE [DISTWIDTH] IN BLOOD BY AUTOMATED COUNT: 16.1 % (ref 11.5–14.5)
GFR SERPL CREATININE-BSD FRML MDRD: 60 ML/MIN/1.73
GLOBULIN UR ELPH-MCNC: 2.9 GM/DL
GLUCOSE BLD-MCNC: 95 MG/DL (ref 70–110)
HCT VFR BLD AUTO: 41.3 % (ref 37–47)
HDLC SERPL-MCNC: 43 MG/DL (ref 60–100)
HGB BLD-MCNC: 13.4 G/DL (ref 12–16)
IMM GRANULOCYTES # BLD: 0.01 10*3/MM3 (ref 0–0.03)
IMM GRANULOCYTES NFR BLD: 0.1 % (ref 0–0.5)
LDLC SERPL CALC-MCNC: 94 MG/DL (ref 0–100)
LDLC/HDLC SERPL: 2.18 {RATIO}
LYMPHOCYTES # BLD AUTO: 2.04 10*3/MM3 (ref 1–3)
LYMPHOCYTES NFR BLD AUTO: 30.4 % (ref 21–51)
MCH RBC QN AUTO: 29 PG (ref 27–33)
MCHC RBC AUTO-ENTMCNC: 32.4 G/DL (ref 33–37)
MCV RBC AUTO: 89.4 FL (ref 80–94)
MONOCYTES # BLD AUTO: 0.69 10*3/MM3 (ref 0.1–0.9)
MONOCYTES NFR BLD AUTO: 10.3 % (ref 0–10)
NEUTROPHILS # BLD AUTO: 3.78 10*3/MM3 (ref 1.4–6.5)
NEUTROPHILS NFR BLD AUTO: 56.4 % (ref 30–70)
OSMOLALITY SERPL CALC.SUM OF ELEC: 283.5 MOSM/KG (ref 273–305)
PLATELET # BLD AUTO: 284 10*3/MM3 (ref 130–400)
PMV BLD AUTO: 11 FL (ref 6–10)
POTASSIUM BLD-SCNC: 4.2 MMOL/L (ref 3.5–5.3)
PROT SERPL-MCNC: 7.2 G/DL (ref 6–8)
RBC # BLD AUTO: 4.62 10*6/MM3 (ref 4.2–5.4)
SODIUM BLD-SCNC: 143 MMOL/L (ref 135–153)
TRIGL SERPL-MCNC: 177 MG/DL (ref 0–150)
VLDLC SERPL-MCNC: 35.4 MG/DL
WBC NRBC COR # BLD: 6.71 10*3/MM3 (ref 4.5–12.5)

## 2018-02-12 PROCEDURE — 99214 OFFICE O/P EST MOD 30 MIN: CPT | Performed by: GENERAL PRACTICE

## 2018-02-12 PROCEDURE — 80061 LIPID PANEL: CPT | Performed by: GENERAL PRACTICE

## 2018-02-12 PROCEDURE — 80053 COMPREHEN METABOLIC PANEL: CPT | Performed by: GENERAL PRACTICE

## 2018-02-12 PROCEDURE — 85025 COMPLETE CBC W/AUTO DIFF WBC: CPT | Performed by: GENERAL PRACTICE

## 2018-02-12 RX ORDER — ACETAMINOPHEN 500 MG
1000 TABLET ORAL EVERY 6 HOURS PRN
Qty: 180 TABLET | Refills: 5 | Status: SHIPPED | OUTPATIENT
Start: 2018-02-12 | End: 2019-03-07 | Stop reason: SDUPTHER

## 2018-02-12 RX ORDER — GABAPENTIN 600 MG/1
600 TABLET ORAL 3 TIMES DAILY
Qty: 90 TABLET | Refills: 2 | Status: SHIPPED | OUTPATIENT
Start: 2018-02-12 | End: 2018-05-11 | Stop reason: SDUPTHER

## 2018-02-12 RX ORDER — SULFAMETHOXAZOLE AND TRIMETHOPRIM 800; 160 MG/1; MG/1
1 TABLET ORAL 2 TIMES DAILY
Qty: 20 TABLET | Refills: 0 | Status: SHIPPED | OUTPATIENT
Start: 2018-02-12 | End: 2018-02-22

## 2018-02-12 RX ORDER — LORATADINE 10 MG/1
10 TABLET ORAL DAILY PRN
Qty: 30 TABLET | Refills: 5 | Status: SHIPPED | OUTPATIENT
Start: 2018-02-12 | End: 2019-03-07 | Stop reason: SDUPTHER

## 2018-02-12 RX ORDER — PREDNISONE 20 MG/1
TABLET ORAL
Qty: 20 TABLET | Refills: 0 | Status: SHIPPED | OUTPATIENT
Start: 2018-02-12 | End: 2018-02-22

## 2018-02-12 NOTE — PROGRESS NOTES
Deonte Acosta is a 47 y.o. female.     History of Present Illness     COPD Exacerbation  Current symptoms include acute on chronic dyspnea, cough productive of whitish roundish sputum in moderate amounts and wheezing. Symptoms have been present for the last week and have been gradually worsening. She denies chest pain or hemoptysis. Associated symptoms include sneezing, nasal congestion, periorbital pressure and a sore throat. There's no history of any other upper respiratory tract symptoms and she denies any fever or chills.  This episode appears to have been triggered by upper respiratory infection and allergens. The patient has been having similar episodes for a number of years and has required a number of hospital admissions in the past. She continues to smoke    GERD  Patient has a history of heartburn and bitter taste in mouth. Symptoms have been present for a number of years .The patient denies abdominal bloating, dysphagia, hematemesis, melena and unexpected weight loss. Symptoms appear to be worsened by large meals, lying down and fatty foods. Risk factors present for GERD include tobacco abuse, caffeine use and obesity. Risk factors absent for GERD are alcohol use and NSAID use. Studies performed so far include none. Treatments tried so far include proton pump inhibitor: lansoprazole. Results of treatment: good control of her symptoms but only if she takes it twice daily    Neck Pain  The patient has had chronic neck pain. Symptoms have been present for a number of years and are unchanged. The pain is located in the posterior neck bilaterally and radiates to the left posterior shoulder. The pain is described as sharp and stiffness and occurs intermittently. She rates her pain as moderate. Symptoms are exacerbated by any movement and prolonged posture. Symptoms are improved by gentle exercise/stretches, heat and cyclobenzaprine and gabapentin. She has tingling about the left shoulder  associated with the neck pain. She denies any weakness in the right arm, weakness in the left arm, tingling in the right arm, change in bladder function, change in bowel function, urinary incontinence and bowel incontinence. MRI of the cervical spine performed on 6/29/15 was reported as showing DDD and OA    Anxiety  She has a history of the following anxiety symptoms: nervousness, worrying, insomnia, fatigue, feelings of losing control. Onset of symptoms was a number of years ago.  Symptoms have been intermittent since lthen. She denies current suicidal and homicidal ideation. Risk factors: negative life event disappearance of her son Treatment has included medication duloxetine, buspirone and hydroxyzine .   She denies any apparent side effects    Dyslipidemia  Compliance with treatment has been fair. The patient exercises intermittently. She is currently being prescribed the following medication for her dyslipidemia - atorvastatin, omega 3 fatty acids. Patient denies side effects associated with her medications.     The following portions of the patient's history were reviewed and updated as appropriate: allergies, current medications, past medical history, past social history and problem list.    Review of Systems   Constitutional: Positive for fatigue. Negative for appetite change, chills, fever and unexpected weight change.   HENT: Positive for congestion, rhinorrhea, sneezing and sore throat. Negative for ear pain, postnasal drip and voice change.    Eyes: Negative for itching and visual disturbance.   Respiratory: Positive for cough, shortness of breath and wheezing.    Cardiovascular: Negative for chest pain, palpitations and leg swelling.   Gastrointestinal: Negative for abdominal pain, blood in stool, constipation, diarrhea, nausea and vomiting.        Occasional heartburn   Endocrine: Negative for polydipsia.   Genitourinary: Negative for difficulty urinating, dysuria, frequency, hematuria, menstrual  problem, pelvic pain, urgency, vaginal bleeding and vaginal discharge.   Musculoskeletal: Positive for arthralgias and neck pain. Negative for back pain, joint swelling and myalgias.   Skin: Negative for color change.   Neurological: Positive for numbness (left lateral neck and posterior shoulder). Negative for tremors, weakness and headaches.   Psychiatric/Behavioral: Positive for sleep disturbance. Negative for dysphoric mood and suicidal ideas. The patient is nervous/anxious.      Objective   Physical Exam   Constitutional: She is oriented to person, place, and time. No distress.   Appeared older then stated age. Bright and in good spirits however. No apparent distress. No pallor, jaundice, diaphoresis, or cyanosis.     HENT:   Head: Atraumatic.   Right Ear: Tympanic membrane, external ear and ear canal normal.   Left Ear: Tympanic membrane, external ear and ear canal normal.   Nose: Nose normal.   Mouth/Throat: Oropharynx is clear and moist. Mucous membranes are not pale and not cyanotic. Abnormal dentition (adenticulate).   Eyes: EOM are normal. Pupils are equal, round, and reactive to light. No scleral icterus.   Neck: No JVD present. Carotid bruit is not present. No tracheal deviation present. No thyromegaly present.   Cardiovascular: Normal rate, regular rhythm, S1 normal, S2 normal and intact distal pulses.  Exam reveals no gallop, no S3 and no S4.    No murmur heard.  Pulmonary/Chest: No stridor. No respiratory distress. She has decreased breath sounds (diffuse). She has wheezes (diffuse - moderate). She has no rhonchi. She has no rales.   Mild pulmonary hyperinflation   Abdominal: Soft. Normal aorta and bowel sounds are normal. She exhibits no distension, no abdominal bruit and no mass. There is no hepatosplenomegaly. There is no tenderness. No hernia.   Musculoskeletal: She exhibits no deformity.       Vascular Status -  Her exam exhibits right foot vasculature normal. Her exam exhibits no right foot  edema. Her exam exhibits left foot vasculature normal. Her exam exhibits no left foot edema.  Lymphadenopathy:        Head (right side): No submandibular adenopathy present.        Head (left side): No submandibular adenopathy present.     She has no cervical adenopathy.   Neurological: She is alert and oriented to person, place, and time. She has normal reflexes. She displays normal reflexes. No cranial nerve deficit. She exhibits normal muscle tone. Coordination normal.   Skin: Skin is warm and dry. No rash noted. She is not diaphoretic. No cyanosis. No pallor. Nails show no clubbing.   Psychiatric: She has a normal mood and affect.     Assessment/Plan   Problems Addressed this Visit        Cardiovascular and Mediastinum    Mixed hyperlipidemia   Encouraged to continue to work on her diet and exercise plan.  Continue current medication  Updated lab work drawn     Relevant Orders    Osmolality, Calculated (Completed)       Respiratory    Chronic obstructive pulmonary disease  COPD is worsening.  Reminded of the importance of smoking cessation  Encouraged to remain as active as symptoms allow for  Empiric antibiotics as per orders along with another course of oral corticosteroids     Relevant Medications    sulfamethoxazole-trimethoprim (BACTRIM DS,SEPTRA DS) 800-160 MG per tablet    predniSONE (DELTASONE) 20 MG tablet    loratadine (CLARITIN) 10 MG tablet    Chronic non-seasonal allergic rhinitis  Reminded regarding allergen avoidance.   Add loratadine    Relevant Medications    loratadine (CLARITIN) 10 MG tablet       Digestive    Gastroesophageal reflux disease without esophagitis  Symptoms are currently improved  Reminded regarding lifestyle modification.  Continue current medication    Relevant Orders    CBC Auto Differential (Completed)       Musculoskeletal and Integument    DDD (degenerative disc disease), cervical  Reminded regarding symptomatic treatment.   Continue current medication    Relevant  Medications    gabapentin (NEURONTIN) 600 MG tablet    acetaminophen (ACETAMINOPHEN EXTRA STRENGTH) 500 MG tablet       Other    Chronic anxiety  Stable.  Supportive therapy.   Continue current medication.    Healthcare maintenance  Encouraged to follow-up with her mammogram  Patient agrees to a pneumovax at her return.    Smoker

## 2018-02-27 DIAGNOSIS — F41.9 ANXIETY: ICD-10-CM

## 2018-02-27 DIAGNOSIS — M50.30 DDD (DEGENERATIVE DISC DISEASE), CERVICAL: ICD-10-CM

## 2018-02-27 RX ORDER — CYCLOBENZAPRINE HCL 10 MG
TABLET ORAL
Qty: 90 TABLET | Refills: 5 | Status: SHIPPED | OUTPATIENT
Start: 2018-02-27 | End: 2018-08-29 | Stop reason: SDUPTHER

## 2018-02-27 RX ORDER — GABAPENTIN 600 MG/1
TABLET ORAL
Qty: 90 TABLET | Refills: 2 | OUTPATIENT
Start: 2018-02-27

## 2018-02-27 RX ORDER — BUSPIRONE HYDROCHLORIDE 10 MG/1
TABLET ORAL
Qty: 90 TABLET | Refills: 5 | Status: SHIPPED | OUTPATIENT
Start: 2018-02-27 | End: 2018-08-29 | Stop reason: SDUPTHER

## 2018-02-27 RX ORDER — HYDROXYZINE PAMOATE 25 MG/1
CAPSULE ORAL
Qty: 90 CAPSULE | Refills: 5 | Status: SHIPPED | OUTPATIENT
Start: 2018-02-27 | End: 2018-08-29 | Stop reason: SDUPTHER

## 2018-03-28 DIAGNOSIS — M50.30 DDD (DEGENERATIVE DISC DISEASE), CERVICAL: ICD-10-CM

## 2018-03-28 DIAGNOSIS — J44.9 CHRONIC OBSTRUCTIVE PULMONARY DISEASE, UNSPECIFIED COPD TYPE (HCC): ICD-10-CM

## 2018-03-28 DIAGNOSIS — F41.9 CHRONIC ANXIETY: ICD-10-CM

## 2018-03-28 DIAGNOSIS — E78.2 MIXED HYPERLIPIDEMIA: ICD-10-CM

## 2018-03-28 RX ORDER — ATORVASTATIN CALCIUM 40 MG/1
40 TABLET, FILM COATED ORAL NIGHTLY
Qty: 30 TABLET | Refills: 5 | Status: SHIPPED | OUTPATIENT
Start: 2018-03-28 | End: 2018-09-27 | Stop reason: SDUPTHER

## 2018-03-28 RX ORDER — DULOXETIN HYDROCHLORIDE 30 MG/1
CAPSULE, DELAYED RELEASE ORAL
Qty: 30 CAPSULE | Refills: 5 | Status: SHIPPED | OUTPATIENT
Start: 2018-03-28 | End: 2018-09-27 | Stop reason: SDUPTHER

## 2018-03-28 RX ORDER — MONTELUKAST SODIUM 10 MG/1
TABLET ORAL
Qty: 30 TABLET | Refills: 5 | Status: SHIPPED | OUTPATIENT
Start: 2018-03-28 | End: 2018-09-27 | Stop reason: SDUPTHER

## 2018-03-28 RX ORDER — ASPIRIN 81 MG/1
TABLET ORAL
Qty: 30 TABLET | Refills: 5 | Status: SHIPPED | OUTPATIENT
Start: 2018-03-28 | End: 2018-09-27 | Stop reason: SDUPTHER

## 2018-03-30 ENCOUNTER — OFFICE VISIT (OUTPATIENT)
Dept: FAMILY MEDICINE CLINIC | Facility: CLINIC | Age: 48
End: 2018-03-30

## 2018-03-30 DIAGNOSIS — F17.200 SMOKER: ICD-10-CM

## 2018-03-30 DIAGNOSIS — J30.89 CHRONIC NON-SEASONAL ALLERGIC RHINITIS, UNSPECIFIED TRIGGER: ICD-10-CM

## 2018-03-30 DIAGNOSIS — K21.9 GASTROESOPHAGEAL REFLUX DISEASE WITHOUT ESOPHAGITIS: ICD-10-CM

## 2018-03-30 DIAGNOSIS — J44.9 CHRONIC OBSTRUCTIVE PULMONARY DISEASE, UNSPECIFIED COPD TYPE (HCC): ICD-10-CM

## 2018-03-30 DIAGNOSIS — E78.2 MIXED HYPERLIPIDEMIA: Primary | ICD-10-CM

## 2018-03-30 PROCEDURE — 99213 OFFICE O/P EST LOW 20 MIN: CPT | Performed by: GENERAL PRACTICE

## 2018-03-30 RX ORDER — LEVOFLOXACIN 500 MG/1
500 TABLET, FILM COATED ORAL DAILY
Qty: 10 TABLET | Refills: 0 | Status: SHIPPED | OUTPATIENT
Start: 2018-03-30 | End: 2018-04-09

## 2018-03-30 RX ORDER — PREDNISONE 20 MG/1
TABLET ORAL
Qty: 20 TABLET | Refills: 0 | Status: SHIPPED | OUTPATIENT
Start: 2018-03-30 | End: 2018-04-09

## 2018-03-31 VITALS
RESPIRATION RATE: 12 BRPM | HEIGHT: 63 IN | BODY MASS INDEX: 39.51 KG/M2 | WEIGHT: 223 LBS | SYSTOLIC BLOOD PRESSURE: 125 MMHG | TEMPERATURE: 98 F | DIASTOLIC BLOOD PRESSURE: 75 MMHG | OXYGEN SATURATION: 98 % | HEART RATE: 96 BPM

## 2018-03-31 NOTE — PROGRESS NOTES
Deonte Acosta is a 47 y.o. female.     History of Present Illness     COPD Exacerbation  She has remained more short of breath since last here with a persistent cough productive of whitish yellowish sputum in moderate amounts and wheezing. Symptoms have been present for the last several months and have been relatively unchanged. She denies any chest pain or hemoptysis. Associated symptoms include sneezing, nasal congestion, periorbital pressure and a sore throat. There's no history of any other upper respiratory tract symptoms and she denies any fever or chills. The patient has been having similar episodes for a number of years and has required a number of hospital admissions in the past. She continues to smoke.    The following portions of the patient's history were reviewed and updated as appropriate: allergies, current medications, past medical history, past social history and problem list.    Review of Systems   Constitutional: Positive for fatigue. Negative for appetite change, chills, fever and unexpected weight change.   HENT: Positive for congestion, rhinorrhea, sneezing and sore throat. Negative for ear pain, postnasal drip and voice change.    Eyes: Negative for itching and visual disturbance.   Respiratory: Positive for cough, shortness of breath and wheezing.    Cardiovascular: Negative for chest pain, palpitations and leg swelling.   Gastrointestinal: Negative for abdominal pain, blood in stool, constipation, diarrhea, nausea and vomiting.        Occasional heartburn   Endocrine: Negative for polydipsia.   Genitourinary: Negative for difficulty urinating, dysuria, frequency, hematuria, menstrual problem, pelvic pain, urgency, vaginal bleeding and vaginal discharge.   Musculoskeletal: Positive for arthralgias and neck pain. Negative for back pain, joint swelling and myalgias.   Skin: Negative for color change.   Neurological: Positive for numbness (left lateral neck and posterior shoulder).  Negative for tremors, weakness and headaches.   Psychiatric/Behavioral: Positive for sleep disturbance. Negative for dysphoric mood and suicidal ideas. The patient is nervous/anxious.      Objective   Physical Exam   Constitutional: She is oriented to person, place, and time. No distress.   Appeared older then stated age. Bright and in good spirits however. No apparent distress. No pallor, jaundice, diaphoresis, or cyanosis.     HENT:   Head: Atraumatic.   Right Ear: Tympanic membrane, external ear and ear canal normal.   Left Ear: Tympanic membrane, external ear and ear canal normal.   Nose: Nose normal.   Mouth/Throat: Oropharynx is clear and moist. Mucous membranes are not pale and not cyanotic. Abnormal dentition (adenticulate).   Eyes: EOM are normal. Pupils are equal, round, and reactive to light. No scleral icterus.   Neck: No JVD present. Carotid bruit is not present. No tracheal deviation present. No thyromegaly present.   Cardiovascular: Normal rate, regular rhythm, S1 normal, S2 normal and intact distal pulses.  Exam reveals no gallop, no S3 and no S4.    No murmur heard.  Pulmonary/Chest: No stridor. No respiratory distress. She has decreased breath sounds (diffuse). She has wheezes (diffuse - moderate). She has no rhonchi. She has no rales.   Mild pulmonary hyperinflation   Abdominal: Soft. Bowel sounds are normal.   Musculoskeletal: She exhibits no deformity.     Vascular Status -  Her right foot exhibits no edema. Her left foot exhibits no edema.  Lymphadenopathy:        Head (right side): No submandibular adenopathy present.        Head (left side): No submandibular adenopathy present.     She has no cervical adenopathy.   Neurological: She is alert and oriented to person, place, and time. No cranial nerve deficit. She exhibits normal muscle tone. Coordination normal.   Skin: Skin is warm and dry. No rash noted. She is not diaphoretic. No cyanosis. No pallor. Nails show no clubbing.   Psychiatric:  She has a normal mood and affect.     Assessment/Plan   Problems Addressed this Visit        Cardiovascular and Mediastinum    Mixed hyperlipidemia   Encouraged to continue to work on her diet and exercise plan.  Continue current medication       Respiratory    Chronic obstructive pulmonary disease  COPD is worsening.  Reminded of the importance of smoking cessation  Encouraged to remain as active as symptoms allow for  Prescription written for a course of levofloxacin and prednisone. Patient will report if she should experience any apparent side effects including any tendon discomfort  Will arrange a CT of the chest  Referral to pulmonology will also be made    Relevant Medications    predniSONE (DELTASONE) 20 MG tablet    levoFLOXacin (LEVAQUIN) 500 MG tablet    Other Relevant Orders    CT Chest With Contrast    Full Pulmonary Function Test With Bronchodilator & ABG    Ambulatory Referral to Pulmonology    Chronic non-seasonal allergic rhinitis       Digestive    Gastroesophageal reflux disease without esophagitis       Other    Smoker

## 2018-05-11 ENCOUNTER — OFFICE VISIT (OUTPATIENT)
Dept: FAMILY MEDICINE CLINIC | Facility: CLINIC | Age: 48
End: 2018-05-11

## 2018-05-11 DIAGNOSIS — Z00.00 HEALTHCARE MAINTENANCE: ICD-10-CM

## 2018-05-11 DIAGNOSIS — E78.2 MIXED HYPERLIPIDEMIA: Primary | ICD-10-CM

## 2018-05-11 DIAGNOSIS — K21.9 GASTROESOPHAGEAL REFLUX DISEASE WITHOUT ESOPHAGITIS: ICD-10-CM

## 2018-05-11 DIAGNOSIS — J30.89 CHRONIC NON-SEASONAL ALLERGIC RHINITIS, UNSPECIFIED TRIGGER: ICD-10-CM

## 2018-05-11 DIAGNOSIS — J44.9 COPD MIXED TYPE (HCC): ICD-10-CM

## 2018-05-11 DIAGNOSIS — F17.200 SMOKER: ICD-10-CM

## 2018-05-11 DIAGNOSIS — F41.9 CHRONIC ANXIETY: ICD-10-CM

## 2018-05-11 DIAGNOSIS — Z23 ENCOUNTER FOR IMMUNIZATION: ICD-10-CM

## 2018-05-11 DIAGNOSIS — M50.30 DDD (DEGENERATIVE DISC DISEASE), CERVICAL: ICD-10-CM

## 2018-05-11 PROCEDURE — 99214 OFFICE O/P EST MOD 30 MIN: CPT | Performed by: GENERAL PRACTICE

## 2018-05-11 PROCEDURE — 90670 PCV13 VACCINE IM: CPT | Performed by: GENERAL PRACTICE

## 2018-05-11 PROCEDURE — 90471 IMMUNIZATION ADMIN: CPT | Performed by: GENERAL PRACTICE

## 2018-05-11 RX ORDER — GABAPENTIN 600 MG/1
600 TABLET ORAL 3 TIMES DAILY
Qty: 90 TABLET | Refills: 2 | Status: SHIPPED | OUTPATIENT
Start: 2018-05-11 | End: 2018-08-20 | Stop reason: SDUPTHER

## 2018-05-11 NOTE — PROGRESS NOTES
Deonte Acosta is a 47 y.o. female.     History of Present Illness     COPD  The patient has a history of SOB, cough and wheezing. She states that the symptoms occur during the day. She reports that her symptoms become worse with activity and exposure to cold air. Her symptoms are reduced with rest and with inhaled inhaled medication. She has had multiple admissions to hospital for apparent exacerbations associated with pneumonia. She is following the treatment plan, including medications as directed. She has been scheduled for a CT of the chest and PFT next week and will undergo a pulmonology assessment afterward. She currently smokes approximately 1 packs per day.    GERD  Patient has a history of heartburn and bitter taste in mouth. Symptoms have been present for a number of years .The patient denies abdominal bloating, dysphagia, hematemesis, melena and unexpected weight loss. Symptoms appear to be worsened by large meals, lying down and fatty foods. Risk factors present for GERD include tobacco abuse, caffeine use and obesity. Risk factors absent for GERD are alcohol use and NSAID use. Studies performed so far include none. Treatments tried so far include proton pump inhibitor: lansoprazole. Results of treatment: good control of her symptoms but only if she takes it twice daily    Neck Pain  The patient has had chronic neck pain. Symptoms have been present for a number of years and are unchanged. The pain is located in the posterior neck bilaterally and radiates to the left posterior shoulder. The pain is described as sharp and stiffness and occurs intermittently. She rates her pain as moderate. Symptoms are exacerbated by any movement and prolonged posture. Symptoms are improved by gentle exercise/stretches, heat and cyclobenzaprine and gabapentin. She has tingling about the left shoulder associated with the neck pain. She denies any weakness in the right arm, weakness in the left arm, tingling in  the right arm, change in bladder function, change in bowel function, urinary incontinence and bowel incontinence. MRI of the cervical spine performed on 6/29/15 was reported as showing DDD and OA    Anxiety  She has a history of the following anxiety symptoms: nervousness, worrying, insomnia, fatigue, feelings of losing control. Onset of symptoms was a number of years ago.  Symptoms have been intermittent since lthen. She denies current suicidal and homicidal ideation. Risk factors: negative life event disappearance of her son Treatment has included medication duloxetine, buspirone and hydroxyzine .   She denies any apparent side effects    Dyslipidemia  Compliance with treatment has been fair. The patient exercises intermittently. She is currently being prescribed the following medication for her dyslipidemia - atorvastatin, omega 3 fatty acids. Patient denies side effects associated with her medications.     The following portions of the patient's history were reviewed and updated as appropriate: allergies, current medications, past medical history, past social history and problem list.    Review of Systems   Constitutional: Positive for fatigue. Negative for appetite change, chills, fever and unexpected weight change.   HENT: Positive for rhinorrhea and sneezing. Negative for congestion, ear pain, postnasal drip, sore throat and voice change.    Eyes: Negative for itching and visual disturbance.   Respiratory: Positive for cough, shortness of breath and wheezing.    Cardiovascular: Negative for chest pain, palpitations and leg swelling.   Gastrointestinal: Negative for abdominal pain, blood in stool, constipation, diarrhea, nausea and vomiting.        Occasional heartburn   Endocrine: Negative for polydipsia.   Genitourinary: Negative for difficulty urinating, dysuria, frequency, hematuria, menstrual problem, pelvic pain, urgency, vaginal bleeding and vaginal discharge.   Musculoskeletal: Positive for arthralgias  and neck pain. Negative for back pain, joint swelling and myalgias.   Skin: Negative for color change.   Neurological: Positive for numbness (left lateral neck and posterior shoulder). Negative for tremors, weakness and headaches.   Psychiatric/Behavioral: Positive for sleep disturbance. Negative for dysphoric mood and suicidal ideas. The patient is nervous/anxious.      Objective   Physical Exam   Constitutional: She is oriented to person, place, and time. No distress.   Appeared older then stated age. Bright and in good spirits however. No apparent distress. No pallor, jaundice, diaphoresis, or cyanosis.     HENT:   Head: Atraumatic.   Right Ear: Tympanic membrane, external ear and ear canal normal.   Left Ear: Tympanic membrane, external ear and ear canal normal.   Nose: Nose normal.   Mouth/Throat: Oropharynx is clear and moist. Mucous membranes are not pale and not cyanotic. Abnormal dentition (adenticulate).   Eyes: EOM are normal. Pupils are equal, round, and reactive to light. No scleral icterus.   Neck: No JVD present. Carotid bruit is not present. No tracheal deviation present. No thyromegaly present.   Cardiovascular: Normal rate, regular rhythm, S1 normal, S2 normal and intact distal pulses.  Exam reveals no gallop, no S3 and no S4.    No murmur heard.  Pulmonary/Chest: No stridor. No respiratory distress. She has decreased breath sounds (diffuse). She has wheezes (diffuse - moderate). She has no rhonchi. She has no rales.   Mild pulmonary hyperinflation   Abdominal: Soft. Bowel sounds are normal.   Musculoskeletal: She exhibits no deformity.     Vascular Status -  Her right foot exhibits no edema. Her left foot exhibits no edema.  Lymphadenopathy:        Head (right side): No submandibular adenopathy present.        Head (left side): No submandibular adenopathy present.     She has no cervical adenopathy.   Neurological: She is alert and oriented to person, place, and time. No cranial nerve deficit. She  exhibits normal muscle tone. Coordination normal.   Skin: Skin is warm and dry. No rash noted. She is not diaphoretic. No cyanosis. No pallor. Nails show no clubbing.   Psychiatric: She has a normal mood and affect.     Assessment/Plan   Problems Addressed this Visit        Cardiovascular and Mediastinum    Mixed hyperlipidemia  Encouraged to continue to work on her diet and exercise plan.  Continue current medication       Respiratory    COPD mixed type  COPD is worsening.  Reminded of the importance of smoking cessation  Encouraged to remain as active as symptoms allow for  Follow up with pulmonology    Chronic non-seasonal allergic rhinitis       Digestive    Gastroesophageal reflux disease without esophagitis       Musculoskeletal and Integument    DDD (degenerative disc disease), cervical  Reminded regarding symptomatic treatment.   Continue current medication    Relevant Medications    gabapentin (NEURONTIN) 600 MG tablet       Other    Chronic anxiety  Stable.  Supportive therapy.   Continue current medication.    Healthcare maintenance  Patient agrees today to a prevnar  Will plan on administering a pneumovax in the fall with a flu shot  Reminded to follow up with her mammogram    Smoker

## 2018-05-12 VITALS
HEART RATE: 100 BPM | TEMPERATURE: 98.6 F | RESPIRATION RATE: 12 BRPM | BODY MASS INDEX: 38.58 KG/M2 | HEIGHT: 64 IN | WEIGHT: 226 LBS | SYSTOLIC BLOOD PRESSURE: 120 MMHG | DIASTOLIC BLOOD PRESSURE: 65 MMHG | OXYGEN SATURATION: 95 %

## 2018-05-12 PROBLEM — M77.11 LATERAL EPICONDYLITIS OF RIGHT ELBOW: Status: RESOLVED | Noted: 2017-08-22 | Resolved: 2018-05-12

## 2018-08-20 ENCOUNTER — OFFICE VISIT (OUTPATIENT)
Dept: FAMILY MEDICINE CLINIC | Facility: CLINIC | Age: 48
End: 2018-08-20

## 2018-08-20 VITALS
OXYGEN SATURATION: 96 % | RESPIRATION RATE: 12 BRPM | SYSTOLIC BLOOD PRESSURE: 125 MMHG | TEMPERATURE: 98.9 F | DIASTOLIC BLOOD PRESSURE: 80 MMHG | BODY MASS INDEX: 38.93 KG/M2 | HEIGHT: 64 IN | WEIGHT: 228 LBS | HEART RATE: 101 BPM

## 2018-08-20 DIAGNOSIS — Z00.00 HEALTHCARE MAINTENANCE: ICD-10-CM

## 2018-08-20 DIAGNOSIS — F17.200 SMOKER: ICD-10-CM

## 2018-08-20 DIAGNOSIS — S83.411A SPRAIN OF MEDIAL COLLATERAL LIGAMENT OF RIGHT KNEE, INITIAL ENCOUNTER: ICD-10-CM

## 2018-08-20 DIAGNOSIS — J30.89 CHRONIC NON-SEASONAL ALLERGIC RHINITIS, UNSPECIFIED TRIGGER: ICD-10-CM

## 2018-08-20 DIAGNOSIS — M50.30 DDD (DEGENERATIVE DISC DISEASE), CERVICAL: ICD-10-CM

## 2018-08-20 DIAGNOSIS — E78.2 MIXED HYPERLIPIDEMIA: Primary | ICD-10-CM

## 2018-08-20 DIAGNOSIS — Z12.31 ENCOUNTER FOR SCREENING MAMMOGRAM FOR BREAST CANCER: ICD-10-CM

## 2018-08-20 DIAGNOSIS — J44.9 COPD MIXED TYPE (HCC): ICD-10-CM

## 2018-08-20 DIAGNOSIS — F41.9 CHRONIC ANXIETY: ICD-10-CM

## 2018-08-20 DIAGNOSIS — K21.9 GASTROESOPHAGEAL REFLUX DISEASE WITHOUT ESOPHAGITIS: ICD-10-CM

## 2018-08-20 PROCEDURE — 99214 OFFICE O/P EST MOD 30 MIN: CPT | Performed by: GENERAL PRACTICE

## 2018-08-20 RX ORDER — GABAPENTIN 600 MG/1
600 TABLET ORAL 3 TIMES DAILY
Qty: 90 TABLET | Refills: 2 | Status: SHIPPED | OUTPATIENT
Start: 2018-08-20 | End: 2018-11-08 | Stop reason: SDUPTHER

## 2018-08-20 NOTE — PROGRESS NOTES
Deonte Acosta is a 48 y.o. female.     History of Present Illness     Right Knee Pain  Returns with a 4 day history of right knee pain.  There is no history of any strain or trauma however the pain has been present since she got caught in a rain shower while walking and had to rush home.  The pain is described as a sharp medial ache with weightbearing.  This does not radiate and apart from mild swelling of that calf has been unassociated with any other symptoms.  There is no history of any stiffness, giving way, or locking.  She denies any previous problems with that joint.    Neck Pain  The patient has had chronic neck pain. Symptoms have been present for a number of years and are unchanged. The pain is located in the posterior neck bilaterally and radiates to the left posterior shoulder. The pain is described as sharp and stiffness and occurs intermittently. She rates her pain as moderate. Symptoms are exacerbated by any movement and prolonged posture. Symptoms are improved by gentle exercise/stretches, heat and cyclobenzaprine and gabapentin. She has tingling about the left shoulder associated with the neck pain. She denies any weakness in the right arm, weakness in the left arm, tingling in the right arm, change in bladder function, change in bowel function, urinary incontinence and bowel incontinence. MRI of the cervical spine performed on 6/29/15 was reported as showing DDD and OA    COPD  The patient has a history of SOB, cough and wheezing. She states that the symptoms occur during the day. She reports that her symptoms become worse with activity and exposure to cold air. Her symptoms are reduced with rest and with inhaled inhaled medication. She has had multiple admissions to hospital for apparent exacerbations associated with pneumonia. She is following the treatment plan, including medications as directed. She missed her appointments for a CT of the chest and PFT and a pulmonology  assessment.  The latter has been rescheduled for later this week.  She currently smokes approximately 1 packs per day.    GERD  Patient has a history of heartburn and bitter taste in mouth. Symptoms have been present for a number of years .The patient denies abdominal bloating, dysphagia, hematemesis, melena and unexpected weight loss. Symptoms appear to be worsened by large meals, lying down and fatty foods. Risk factors present for GERD include tobacco abuse, caffeine use and obesity. Risk factors absent for GERD are alcohol use and NSAID use. Studies performed so far include none. Treatments tried so far include proton pump inhibitor: lansoprazole. Results of treatment: good control of her symptoms but only if she takes it twice daily    Anxiety  She has a history of the following anxiety symptoms: nervousness, worrying, insomnia, fatigue, feelings of losing control. Onset of symptoms was a number of years ago.  Symptoms have been intermittent since lthen. She denies current suicidal and homicidal ideation. Risk factors: negative life event disappearance of her son. Treatment has included medication duloxetine, buspirone and hydroxyzine. She denies any apparent side effects    Dyslipidemia  Compliance with treatment has been fair. The patient exercises intermittently. She is currently being prescribed the following medication for her dyslipidemia - atorvastatin, omega 3 fatty acids. Patient denies side effects associated with her medications.     The following portions of the patient's history were reviewed and updated as appropriate: allergies, current medications, past medical history, past social history and problem list.    Review of Systems   Constitutional: Positive for fatigue. Negative for appetite change, chills, fever and unexpected weight change.   HENT: Positive for rhinorrhea and sneezing. Negative for congestion, ear pain, postnasal drip, sore throat and voice change.    Eyes: Negative for itching  and visual disturbance.   Respiratory: Positive for cough, shortness of breath and wheezing.    Cardiovascular: Negative for chest pain, palpitations and leg swelling.   Gastrointestinal: Negative for abdominal pain, blood in stool, constipation, diarrhea, nausea and vomiting.        Occasional heartburn   Endocrine: Negative for polydipsia.   Genitourinary: Negative for difficulty urinating, dysuria, frequency, hematuria, menstrual problem, pelvic pain, urgency, vaginal bleeding and vaginal discharge.   Musculoskeletal: Positive for arthralgias and neck pain. Negative for back pain, joint swelling and myalgias.   Skin: Negative for color change.   Neurological: Positive for numbness (left lateral neck and posterior shoulder). Negative for tremors, weakness and headaches.   Psychiatric/Behavioral: Positive for sleep disturbance. Negative for dysphoric mood and suicidal ideas. The patient is nervous/anxious.      Objective   Physical Exam   Constitutional: She is oriented to person, place, and time. No distress.   Appeared older then stated age. Bright and in good spirits however. Mild right antalgic gait. No apparent distress. No pallor, jaundice, diaphoresis, or cyanosis.     HENT:   Head: Atraumatic.   Right Ear: Tympanic membrane, external ear and ear canal normal.   Left Ear: Tympanic membrane, external ear and ear canal normal.   Nose: Nose normal.   Mouth/Throat: Oropharynx is clear and moist. Mucous membranes are not pale and not cyanotic. Abnormal dentition (adenticulate).   Eyes: Pupils are equal, round, and reactive to light. EOM are normal. No scleral icterus.   Neck: No JVD present. Carotid bruit is not present. No tracheal deviation present. No thyromegaly present.   Cardiovascular: Normal rate, regular rhythm, S1 normal, S2 normal and intact distal pulses.  Exam reveals no gallop, no S3 and no S4.    No murmur heard.  Pulmonary/Chest: No stridor. No respiratory distress. She has decreased breath sounds  (diffuse). She has wheezes (diffuse - moderate). She has no rhonchi. She has no rales.   Mild pulmonary hyperinflation   Abdominal: Soft. Bowel sounds are normal.   Musculoskeletal: She exhibits no deformity.        Right knee: She exhibits normal range of motion, no effusion, no deformity and no erythema. Tenderness found. Medial joint line tenderness noted.     Vascular Status -  Her right foot exhibits abnormal foot edema (trace - proximal calf circumference 1 cm greater than the left - right-hand dominant - no calf muscle tenderness). Her left foot exhibits abnormal foot edema (trace).  Lymphadenopathy:        Head (right side): No submandibular adenopathy present.        Head (left side): No submandibular adenopathy present.     She has no cervical adenopathy.   Neurological: She is alert and oriented to person, place, and time. No cranial nerve deficit. She exhibits normal muscle tone. Coordination normal.   Skin: Skin is warm and dry. No rash noted. She is not diaphoretic. No cyanosis. No pallor. Nails show no clubbing.   Psychiatric: She has a normal mood and affect.     Assessment/Plan   Problems Addressed this Visit        Cardiovascular and Mediastinum    Mixed hyperlipidemia  Encouraged to continue to work on her diet and exercise plan.       Respiratory    COPD mixed type (CMS/HCC)   COPD is worsening.  Reminded of the importance of smoking cessation  Encouraged to remain as active as symptoms allow for  Follow up with pulmonology     Chronic non-seasonal allergic rhinitis       Digestive    Gastroesophageal reflux disease without esophagitis   Symptoms are currently stable.  Reminded regarding lifestyle modification.  Continue current medication       Musculoskeletal and Integument    DDD (degenerative disc disease), cervical    Relevant Medications    gabapentin (NEURONTIN) 600 MG tablet    Sprain of medial collateral ligament of right knee  Advised regarding rest, gentle exercise, ice and  heat.  Prescription written for a splint   Encouraged to report if any worse, any new symptoms, or if not resolving over the next several weeks        Other    Chronic anxiety    Healthcare maintenance  Reminded to get a flu shot when available.  Will reschedule mammogram     Relevant Orders    Mammo Screening Digital Tomosynthesis Bilateral With CAD    Smoker    Encounter for screening mammogram for breast cancer    Relevant Orders    Mammo Screening Digital Tomosynthesis Bilateral With CAD

## 2018-08-21 ENCOUNTER — TELEPHONE (OUTPATIENT)
Dept: FAMILY MEDICINE CLINIC | Facility: CLINIC | Age: 48
End: 2018-08-21

## 2018-08-21 NOTE — TELEPHONE ENCOUNTER
----- Message from Frank Parekh MD sent at 8/20/2018  9:16 PM EDT -----  Will leave new prescription with you  ----- Message -----  From: Mckayla Elder RegSched Rep  Sent: 8/20/2018   6:11 PM  To: Frank Parekh MD    [facility]    Knee brace for mrs medina must say Hinge brace for ins to cover

## 2018-08-29 DIAGNOSIS — F41.9 ANXIETY: ICD-10-CM

## 2018-08-29 DIAGNOSIS — M50.30 DDD (DEGENERATIVE DISC DISEASE), CERVICAL: ICD-10-CM

## 2018-08-29 DIAGNOSIS — J44.9 CHRONIC OBSTRUCTIVE PULMONARY DISEASE, UNSPECIFIED COPD TYPE (HCC): ICD-10-CM

## 2018-08-29 RX ORDER — CYCLOBENZAPRINE HCL 10 MG
TABLET ORAL
Qty: 90 TABLET | Refills: 5 | Status: SHIPPED | OUTPATIENT
Start: 2018-08-29 | End: 2019-03-07 | Stop reason: SDUPTHER

## 2018-08-29 RX ORDER — ALBUTEROL SULFATE 90 UG/1
AEROSOL, METERED RESPIRATORY (INHALATION)
Qty: 18 G | Refills: 5 | Status: SHIPPED | OUTPATIENT
Start: 2018-08-29 | End: 2019-03-07 | Stop reason: SDUPTHER

## 2018-08-29 RX ORDER — BUSPIRONE HYDROCHLORIDE 10 MG/1
TABLET ORAL
Qty: 90 TABLET | Refills: 5 | Status: SHIPPED | OUTPATIENT
Start: 2018-08-29 | End: 2019-03-07 | Stop reason: SDUPTHER

## 2018-08-29 RX ORDER — HYDROXYZINE PAMOATE 25 MG/1
CAPSULE ORAL
Qty: 90 CAPSULE | Refills: 5 | Status: SHIPPED | OUTPATIENT
Start: 2018-08-29 | End: 2019-03-07 | Stop reason: SDUPTHER

## 2018-09-27 DIAGNOSIS — M50.30 DDD (DEGENERATIVE DISC DISEASE), CERVICAL: ICD-10-CM

## 2018-09-27 DIAGNOSIS — K21.9 GASTROESOPHAGEAL REFLUX DISEASE WITHOUT ESOPHAGITIS: ICD-10-CM

## 2018-09-27 DIAGNOSIS — J44.9 CHRONIC OBSTRUCTIVE PULMONARY DISEASE, UNSPECIFIED COPD TYPE (HCC): ICD-10-CM

## 2018-09-27 DIAGNOSIS — E78.2 MIXED HYPERLIPIDEMIA: ICD-10-CM

## 2018-09-27 DIAGNOSIS — F41.9 CHRONIC ANXIETY: ICD-10-CM

## 2018-09-27 RX ORDER — DULOXETIN HYDROCHLORIDE 30 MG/1
CAPSULE, DELAYED RELEASE ORAL
Qty: 30 CAPSULE | Refills: 5 | Status: SHIPPED | OUTPATIENT
Start: 2018-09-27 | End: 2019-03-07 | Stop reason: SDUPTHER

## 2018-09-27 RX ORDER — ASPIRIN 81 MG/1
TABLET, COATED ORAL
Qty: 30 TABLET | Refills: 5 | Status: SHIPPED | OUTPATIENT
Start: 2018-09-27 | End: 2019-03-07 | Stop reason: SDUPTHER

## 2018-09-27 RX ORDER — LANSOPRAZOLE 30 MG/1
CAPSULE, DELAYED RELEASE ORAL
Qty: 60 CAPSULE | Refills: 5 | Status: SHIPPED | OUTPATIENT
Start: 2018-09-27 | End: 2019-03-07 | Stop reason: SDUPTHER

## 2018-09-27 RX ORDER — MONTELUKAST SODIUM 10 MG/1
TABLET ORAL
Qty: 30 TABLET | Refills: 5 | Status: SHIPPED | OUTPATIENT
Start: 2018-09-27 | End: 2019-03-07 | Stop reason: SDUPTHER

## 2018-09-27 RX ORDER — ATORVASTATIN CALCIUM 40 MG/1
40 TABLET, FILM COATED ORAL NIGHTLY
Qty: 30 TABLET | Refills: 5 | Status: SHIPPED | OUTPATIENT
Start: 2018-09-27 | End: 2019-03-07 | Stop reason: SDUPTHER

## 2018-11-08 ENCOUNTER — OFFICE VISIT (OUTPATIENT)
Dept: FAMILY MEDICINE CLINIC | Facility: CLINIC | Age: 48
End: 2018-11-08

## 2018-11-08 VITALS
HEART RATE: 87 BPM | TEMPERATURE: 98.3 F | DIASTOLIC BLOOD PRESSURE: 70 MMHG | SYSTOLIC BLOOD PRESSURE: 120 MMHG | HEIGHT: 64 IN | WEIGHT: 219 LBS | OXYGEN SATURATION: 96 % | BODY MASS INDEX: 37.39 KG/M2 | RESPIRATION RATE: 12 BRPM

## 2018-11-08 DIAGNOSIS — J30.89 CHRONIC NON-SEASONAL ALLERGIC RHINITIS: ICD-10-CM

## 2018-11-08 DIAGNOSIS — M50.30 DDD (DEGENERATIVE DISC DISEASE), CERVICAL: ICD-10-CM

## 2018-11-08 DIAGNOSIS — F41.9 CHRONIC ANXIETY: ICD-10-CM

## 2018-11-08 DIAGNOSIS — E78.2 MIXED HYPERLIPIDEMIA: Primary | ICD-10-CM

## 2018-11-08 DIAGNOSIS — Z00.00 HEALTHCARE MAINTENANCE: ICD-10-CM

## 2018-11-08 DIAGNOSIS — S83.411D SPRAIN OF MEDIAL COLLATERAL LIGAMENT OF RIGHT KNEE, SUBSEQUENT ENCOUNTER: ICD-10-CM

## 2018-11-08 DIAGNOSIS — J44.9 COPD MIXED TYPE (HCC): ICD-10-CM

## 2018-11-08 DIAGNOSIS — Z23 ENCOUNTER FOR IMMUNIZATION: ICD-10-CM

## 2018-11-08 DIAGNOSIS — F17.200 SMOKER: ICD-10-CM

## 2018-11-08 DIAGNOSIS — K21.9 GASTROESOPHAGEAL REFLUX DISEASE WITHOUT ESOPHAGITIS: ICD-10-CM

## 2018-11-08 PROCEDURE — 99214 OFFICE O/P EST MOD 30 MIN: CPT | Performed by: GENERAL PRACTICE

## 2018-11-08 PROCEDURE — 90471 IMMUNIZATION ADMIN: CPT | Performed by: GENERAL PRACTICE

## 2018-11-08 PROCEDURE — 90732 PPSV23 VACC 2 YRS+ SUBQ/IM: CPT | Performed by: GENERAL PRACTICE

## 2018-11-08 RX ORDER — GABAPENTIN 600 MG/1
600 TABLET ORAL 3 TIMES DAILY
Qty: 90 TABLET | Refills: 2 | Status: SHIPPED | OUTPATIENT
Start: 2018-11-08 | End: 2019-02-26 | Stop reason: SDUPTHER

## 2018-11-08 NOTE — PROGRESS NOTES
Deonte Acosta is a 48 y.o. female.     History of Present Illness     Right Knee Pain  Returns with persistent right knee pain.  There is no history of any strain or trauma however the pain has been present since she got caught in a rain shower while walking and had to rush home.  The pain is described as a sharp medial and to a lesser anterior ache with weightbearing.  This does not radiate and apart from mild swelling of that calf has been unassociated with any other symptoms.  There is no history of any stiffness, giving way, or locking.  She denies any previous problems with that joint.    Neck Pain  The patient has had chronic neck pain. Symptoms have been present for a number of years and are unchanged. The pain is located in the posterior neck bilaterally and radiates to the left posterior shoulder. The pain is described as sharp and stiffness and occurs intermittently. She rates her pain as moderate. Symptoms are exacerbated by any movement and prolonged posture. Symptoms are improved by gentle exercise/stretches, heat and cyclobenzaprine and gabapentin. She has tingling about the left shoulder associated with the neck pain. She denies any weakness in the right arm, weakness in the left arm, tingling in the right arm, change in bladder function, change in bowel function, urinary incontinence and bowel incontinence. MRI of the cervical spine performed on 6/29/15 was reported as showing DDD and OA    COPD  The patient has a history of SOB, cough and wheezing. She states that the symptoms occur during the day. She reports that her symptoms become worse with activity and exposure to cold air. Her symptoms are reduced with rest and with inhaled inhaled medication. She has had multiple admissions to hospital for apparent exacerbations associated with pneumonia. She is following the treatment plan, including medications as directed. She has repeatedly missed appointments for a CT of the chest, PFT and  a pulmonology assessment.  She currently smokes approximately 1 packs per day.    GERD  Patient has a history of heartburn and bitter taste in mouth. Symptoms have been present for a number of years .The patient denies abdominal bloating, dysphagia, hematemesis, melena and unexpected weight loss. Symptoms appear to be worsened by large meals, lying down and fatty foods. Risk factors present for GERD include tobacco abuse, caffeine use and obesity. Risk factors absent for GERD are alcohol use and NSAID use. Studies performed so far include none. Treatments tried so far include proton pump inhibitor: lansoprazole. Results of treatment: good control of her symptoms but only if she takes it twice daily    Anxiety  She has a history of the following anxiety symptoms: nervousness, worrying, insomnia, fatigue, feelings of losing control. Onset of symptoms was a number of years ago.  Symptoms have been intermittent since hen. She denies current suicidal and homicidal ideation. Risk factors: negative life event disappearance of her son. Treatment has included medication duloxetine, buspirone and hydroxyzine. She denies any apparent side effects    Dyslipidemia  Compliance with treatment has been fair. The patient exercises intermittently. She is currently being prescribed the following medication for her dyslipidemia - atorvastatin, omega 3 fatty acids. Patient denies side effects associated with her medications.     The following portions of the patient's history were reviewed and updated as appropriate: allergies, current medications, past medical history, past social history and problem list.    Review of Systems   Constitutional: Positive for fatigue. Negative for appetite change, chills, fever and unexpected weight change.   HENT: Positive for rhinorrhea and sneezing. Negative for congestion, ear pain, postnasal drip, sore throat and voice change.    Eyes: Negative for itching and visual disturbance.   Respiratory:  Positive for cough, shortness of breath and wheezing.    Cardiovascular: Negative for chest pain, palpitations and leg swelling.   Gastrointestinal: Negative for abdominal pain, blood in stool, constipation, diarrhea, nausea and vomiting.        Occasional heartburn   Endocrine: Negative for polydipsia.   Genitourinary: Negative for difficulty urinating, dysuria, frequency, hematuria, menstrual problem, pelvic pain, urgency, vaginal bleeding and vaginal discharge.   Musculoskeletal: Positive for arthralgias and neck pain. Negative for back pain, joint swelling and myalgias.   Skin: Negative for color change.   Neurological: Positive for numbness (left lateral neck and posterior shoulder). Negative for tremors, weakness and headaches.   Psychiatric/Behavioral: Positive for sleep disturbance. Negative for dysphoric mood and suicidal ideas. The patient is nervous/anxious.      Objective   Physical Exam   Constitutional: She is oriented to person, place, and time. No distress.   Appeared older then stated age. Bright and in good spirits however.  Right knee brace. Mild right antalgic gait. No apparent distress. No pallor, jaundice, diaphoresis, or cyanosis.     HENT:   Head: Atraumatic.   Right Ear: External ear normal.   Left Ear: Tympanic membrane, external ear and ear canal normal.   Nose: Nose normal.   Mouth/Throat: Oropharynx is clear and moist. Mucous membranes are not pale and not cyanotic. Abnormal dentition (adenticulate).   Right cerumen impaction   Eyes: Pupils are equal, round, and reactive to light. EOM are normal. No scleral icterus.   Neck: No JVD present. Carotid bruit is not present. No tracheal deviation present. No thyromegaly present.   Cardiovascular: Normal rate, regular rhythm, S1 normal, S2 normal and intact distal pulses.  Exam reveals no gallop, no S3 and no S4.    No murmur heard.  Pulmonary/Chest: No stridor. No respiratory distress. She has decreased breath sounds (diffuse). She has wheezes  (diffuse - moderate). She has no rhonchi. She has no rales.   Mild pulmonary hyperinflation   Abdominal: Soft. Bowel sounds are normal.   Musculoskeletal: She exhibits no deformity.        Right knee: She exhibits normal range of motion, no effusion, no deformity and no erythema. Tenderness found. Medial joint line tenderness noted.     Vascular Status -  Her right foot exhibits no edema. Her left foot exhibits no edema.  Lymphadenopathy:        Head (right side): No submandibular adenopathy present.        Head (left side): No submandibular adenopathy present.     She has no cervical adenopathy.   Neurological: She is alert and oriented to person, place, and time. No cranial nerve deficit. She exhibits normal muscle tone. Coordination normal.   Skin: Skin is warm and dry. No rash noted. She is not diaphoretic. No cyanosis. No pallor. Nails show no clubbing.   Psychiatric: She has a normal mood and affect.     Assessment/Plan   Problems Addressed this Visit        Cardiovascular and Mediastinum    Mixed hyperlipidemia   Encouraged to continue to work on her diet and exercise plan.  Continue current medication  Updated labs will be arranged at return.       Respiratory    COPD mixed type (CMS/HCC)   COPD is stable.  Reminded of the importance of smoking cessation  Encouraged to remain as active as symptoms allow for    Chronic non-seasonal allergic rhinitis       Digestive    Gastroesophageal reflux disease without esophagitis   Symptoms are currently stable.  Reminded regarding lifestyle modification.  Continue current medication.       Musculoskeletal and Integument    DDD (degenerative disc disease), cervical  Reminded regarding symptomatic treatment.   Continue current medication    Relevant Medications    gabapentin (NEURONTIN) 600 MG tablet    Sprain of medial collateral ligament of right knee  Plain films of the knee will be arranged.  Will notify patient of the results and if no surprises arrange physical  therapy    Relevant Orders    XR Knee 3 View Right       Other    Chronic anxiety  Significant situational component.   Supportive therapy.   Continue current medication.    Healthcare maintenance  Recommended a flu shot and a pneumovax 23  Patient uninterested in undergoing breast or colon cancer screening at present    Relevant Orders    Pneumococcal Polysaccharide Vaccine 23-Valent Greater Than or Equal To 1yo Subcutaneous / IM (Completed)    Smoker    Encounter for immunization    Relevant Orders    Pneumococcal Polysaccharide Vaccine 23-Valent Greater Than or Equal To 1yo Subcutaneous / IM (Completed)

## 2018-11-13 DIAGNOSIS — M50.30 DDD (DEGENERATIVE DISC DISEASE), CERVICAL: ICD-10-CM

## 2018-11-13 RX ORDER — GABAPENTIN 600 MG/1
TABLET ORAL
Qty: 90 TABLET | Refills: 2 | OUTPATIENT
Start: 2018-11-13

## 2019-02-26 DIAGNOSIS — M50.30 DDD (DEGENERATIVE DISC DISEASE), CERVICAL: ICD-10-CM

## 2019-02-26 RX ORDER — GABAPENTIN 600 MG/1
TABLET ORAL
Qty: 90 TABLET | Refills: 0 | Status: SHIPPED | OUTPATIENT
Start: 2019-02-26 | End: 2019-03-07 | Stop reason: SDUPTHER

## 2019-03-07 ENCOUNTER — OFFICE VISIT (OUTPATIENT)
Dept: FAMILY MEDICINE CLINIC | Facility: CLINIC | Age: 49
End: 2019-03-07

## 2019-03-07 VITALS
BODY MASS INDEX: 37.56 KG/M2 | RESPIRATION RATE: 12 BRPM | TEMPERATURE: 97.5 F | OXYGEN SATURATION: 98 % | HEIGHT: 64 IN | DIASTOLIC BLOOD PRESSURE: 70 MMHG | SYSTOLIC BLOOD PRESSURE: 120 MMHG | HEART RATE: 99 BPM | WEIGHT: 220 LBS

## 2019-03-07 DIAGNOSIS — K21.9 GASTROESOPHAGEAL REFLUX DISEASE WITHOUT ESOPHAGITIS: ICD-10-CM

## 2019-03-07 DIAGNOSIS — F41.9 CHRONIC ANXIETY: ICD-10-CM

## 2019-03-07 DIAGNOSIS — J30.89 CHRONIC NON-SEASONAL ALLERGIC RHINITIS: ICD-10-CM

## 2019-03-07 DIAGNOSIS — Z00.00 HEALTHCARE MAINTENANCE: ICD-10-CM

## 2019-03-07 DIAGNOSIS — J30.9 CHRONIC ALLERGIC RHINITIS: ICD-10-CM

## 2019-03-07 DIAGNOSIS — J44.9 COPD MIXED TYPE (HCC): ICD-10-CM

## 2019-03-07 DIAGNOSIS — E78.2 MIXED HYPERLIPIDEMIA: Primary | ICD-10-CM

## 2019-03-07 DIAGNOSIS — M50.30 DDD (DEGENERATIVE DISC DISEASE), CERVICAL: ICD-10-CM

## 2019-03-07 DIAGNOSIS — J44.9 CHRONIC OBSTRUCTIVE PULMONARY DISEASE, UNSPECIFIED COPD TYPE (HCC): ICD-10-CM

## 2019-03-07 DIAGNOSIS — S83.411D SPRAIN OF MEDIAL COLLATERAL LIGAMENT OF RIGHT KNEE, SUBSEQUENT ENCOUNTER: ICD-10-CM

## 2019-03-07 DIAGNOSIS — F41.9 ANXIETY: ICD-10-CM

## 2019-03-07 DIAGNOSIS — F17.200 SMOKER: ICD-10-CM

## 2019-03-07 PROBLEM — L21.9 SEBORRHEIC DERMATITIS OF SCALP: Status: RESOLVED | Noted: 2019-03-07 | Resolved: 2019-03-07

## 2019-03-07 PROBLEM — L21.9 SEBORRHEIC DERMATITIS OF SCALP: Status: ACTIVE | Noted: 2019-03-07

## 2019-03-07 PROCEDURE — 99214 OFFICE O/P EST MOD 30 MIN: CPT | Performed by: GENERAL PRACTICE

## 2019-03-07 RX ORDER — ACETAMINOPHEN 500 MG
1000 TABLET ORAL EVERY 6 HOURS PRN
Qty: 180 TABLET | Refills: 5 | Status: SHIPPED | OUTPATIENT
Start: 2019-03-07 | End: 2019-10-11 | Stop reason: SDUPTHER

## 2019-03-07 RX ORDER — ALBUTEROL SULFATE 90 UG/1
2 AEROSOL, METERED RESPIRATORY (INHALATION) EVERY 4 HOURS PRN
Qty: 18 G | Refills: 5 | Status: SHIPPED | OUTPATIENT
Start: 2019-03-07 | End: 2019-10-11 | Stop reason: SDUPTHER

## 2019-03-07 RX ORDER — ASPIRIN 81 MG/1
81 TABLET ORAL DAILY
Qty: 30 TABLET | Refills: 5 | Status: SHIPPED | OUTPATIENT
Start: 2019-03-07 | End: 2019-10-11 | Stop reason: SDUPTHER

## 2019-03-07 RX ORDER — ALBUTEROL SULFATE 2.5 MG/3ML
2.5 SOLUTION RESPIRATORY (INHALATION) EVERY 4 HOURS PRN
Qty: 180 VIAL | Refills: 5 | Status: SHIPPED | OUTPATIENT
Start: 2019-03-07 | End: 2019-10-11 | Stop reason: SDUPTHER

## 2019-03-07 RX ORDER — GABAPENTIN 600 MG/1
600 TABLET ORAL 3 TIMES DAILY
Qty: 90 TABLET | Refills: 3 | Status: SHIPPED | OUTPATIENT
Start: 2019-03-07 | End: 2019-08-20 | Stop reason: SDUPTHER

## 2019-03-07 RX ORDER — CYCLOBENZAPRINE HCL 10 MG
10 TABLET ORAL 3 TIMES DAILY
Qty: 90 TABLET | Refills: 5 | Status: SHIPPED | OUTPATIENT
Start: 2019-03-07 | End: 2019-10-11

## 2019-03-07 RX ORDER — PREDNISONE 20 MG/1
TABLET ORAL
Qty: 14 TABLET | Refills: 0 | Status: SHIPPED | OUTPATIENT
Start: 2019-03-07 | End: 2019-03-17

## 2019-03-07 RX ORDER — LANSOPRAZOLE 30 MG/1
30 CAPSULE, DELAYED RELEASE ORAL 2 TIMES DAILY
Qty: 60 CAPSULE | Refills: 5 | Status: SHIPPED | OUTPATIENT
Start: 2019-03-07 | End: 2019-10-11 | Stop reason: SDUPTHER

## 2019-03-07 RX ORDER — BUSPIRONE HYDROCHLORIDE 10 MG/1
10 TABLET ORAL 3 TIMES DAILY
Qty: 90 TABLET | Refills: 5 | Status: SHIPPED | OUTPATIENT
Start: 2019-03-07 | End: 2019-10-11

## 2019-03-07 RX ORDER — LORATADINE 10 MG/1
10 TABLET ORAL DAILY PRN
Qty: 30 TABLET | Refills: 5 | Status: SHIPPED | OUTPATIENT
Start: 2019-03-07 | End: 2019-10-11 | Stop reason: SDUPTHER

## 2019-03-07 RX ORDER — DULOXETIN HYDROCHLORIDE 30 MG/1
30 CAPSULE, DELAYED RELEASE ORAL DAILY
Qty: 30 CAPSULE | Refills: 5 | Status: SHIPPED | OUTPATIENT
Start: 2019-03-07 | End: 2019-10-11 | Stop reason: SDUPTHER

## 2019-03-07 RX ORDER — ATORVASTATIN CALCIUM 40 MG/1
40 TABLET, FILM COATED ORAL NIGHTLY
Qty: 30 TABLET | Refills: 5 | Status: SHIPPED | OUTPATIENT
Start: 2019-03-07 | End: 2019-10-11 | Stop reason: SDUPTHER

## 2019-03-07 RX ORDER — MONTELUKAST SODIUM 10 MG/1
10 TABLET ORAL DAILY
Qty: 30 TABLET | Refills: 5 | Status: SHIPPED | OUTPATIENT
Start: 2019-03-07 | End: 2019-10-11 | Stop reason: SDUPTHER

## 2019-03-07 RX ORDER — HYDROXYZINE PAMOATE 25 MG/1
25 CAPSULE ORAL 3 TIMES DAILY PRN
Qty: 90 CAPSULE | Refills: 5 | Status: SHIPPED | OUTPATIENT
Start: 2019-03-07 | End: 2019-10-11

## 2019-03-07 NOTE — PROGRESS NOTES
Deonte Acosta is a 48 y.o. female.     History of Present Illness     COPD  The patient has a history of SOB, cough and wheezing. These symptoms have been worse over the last 3-4 days. There had been no associated symptoms and she denies any nasal congestion, chest pain, hemoptysis, fever, or chills. She reports that her symptoms become worse with activity and exposure to cold air. Her symptoms are reduced with rest and with inhaled inhaled medication. She has had multiple admissions to hospital for apparent exacerbations associated with pneumonia. She is following the treatment plan, including medications as directed. She has repeatedly missed appointments for a CT of the chest, PFT and a pulmonology assessment.  She continues to smoke approximately 1 packs per day.    GERD  Patient has a history of heartburn and bitter taste in mouth. Symptoms have been present for a number of years .The patient denies abdominal bloating, dysphagia, hematemesis, melena and unexpected weight loss. Symptoms appear to be worsened by large meals, lying down and fatty foods. Risk factors present for GERD include tobacco abuse, caffeine use and obesity. Risk factors absent for GERD are alcohol use and NSAID use. Studies performed so far include none. Treatments tried so far include proton pump inhibitor: lansoprazole. Results of treatment: good control of her symptoms but only if she takes it twice daily    Right Knee Pain  She continues to have intermittent right knee pain.  There has been no change in the quality or severity nor any new associated symptoms. The pain is described as a sharp medial and to a lesser anterior ache with weightbearing.  This does not radiate and apart from mild swelling of that calf has been unassociated with any other symptoms.  There is no history of any stiffness, giving way, or locking.  She denies any previous problems with that joint.  She did not follow-up with the x-rays arranged at her  last visit    Neck Pain  The patient has had chronic neck pain. Symptoms have been present for a number of years and are unchanged. The pain is located in the posterior neck bilaterally and radiates to the left posterior shoulder. The pain is described as sharp and stiffness and occurs intermittently. She rates her pain as moderate. Symptoms are exacerbated by any movement and prolonged posture. Symptoms are improved by gentle exercise/stretches, heat and cyclobenzaprine and gabapentin. She has tingling about the left shoulder associated with the neck pain. She denies any weakness in the right arm, weakness in the left arm, tingling in the right arm, change in bladder function, change in bowel function, urinary incontinence and bowel incontinence. MRI of the cervical spine performed on 6/29/15 was reported as showing DDD and OA    Anxiety  She has a history of the following anxiety symptoms: nervousness, worrying, insomnia, fatigue, feelings of losing control. Onset of symptoms was a number of years ago.  Symptoms have been intermittent since lthen. She denies current suicidal and homicidal ideation. Risk factors: negative life event disappearance of her son. Treatment has included medication duloxetine, buspirone and hydroxyzine. She denies any apparent side effects    Dyslipidemia  Compliance with treatment has been fair. The patient exercises intermittently. She is currently being prescribed the following medication for her dyslipidemia - atorvastatin, omega 3 fatty acids. Patient denies side effects associated with her medications.  She has had no recent labs    The following portions of the patient's history were reviewed and updated as appropriate: allergies, current medications, past medical history, past social history and problem list.    Review of Systems   Constitutional: Positive for fatigue. Negative for appetite change, chills, fever and unexpected weight change.   HENT: Positive for rhinorrhea and  sneezing. Negative for congestion, ear pain, postnasal drip, sore throat and voice change.    Eyes: Negative for itching and visual disturbance.   Respiratory: Positive for cough, shortness of breath and wheezing.    Cardiovascular: Negative for chest pain, palpitations and leg swelling.   Gastrointestinal: Negative for abdominal pain, blood in stool, constipation, diarrhea, nausea and vomiting.        Occasional heartburn   Endocrine: Negative for polydipsia.   Genitourinary: Negative for difficulty urinating, dysuria, frequency, hematuria, menstrual problem, pelvic pain, urgency, vaginal bleeding and vaginal discharge.   Musculoskeletal: Positive for arthralgias and neck pain. Negative for back pain, joint swelling and myalgias.   Skin: Negative for color change.   Neurological: Positive for numbness (left lateral neck and posterior shoulder). Negative for tremors, weakness and headaches.   Psychiatric/Behavioral: Positive for sleep disturbance. Negative for dysphoric mood and suicidal ideas. The patient is nervous/anxious.      Objective   Physical Exam   Constitutional: She is oriented to person, place, and time. No distress.   Appeared older then stated age. Bright and in good spirits however. No apparent distress. No pallor, jaundice, diaphoresis, or cyanosis.     HENT:   Head: Atraumatic.   Right Ear: Tympanic membrane, external ear and ear canal normal.   Left Ear: Tympanic membrane, external ear and ear canal normal.   Nose: Nose normal.   Mouth/Throat: Oropharynx is clear and moist. Mucous membranes are not pale and not cyanotic. Abnormal dentition (adenticulate).   Eyes: EOM are normal. Pupils are equal, round, and reactive to light. No scleral icterus.   Neck: No JVD present. Carotid bruit is not present. No tracheal deviation present. No thyromegaly present.   Cardiovascular: Normal rate, regular rhythm, S1 normal, S2 normal and intact distal pulses. Exam reveals no gallop, no S3 and no S4.   No murmur  heard.  Pulmonary/Chest: No stridor. No respiratory distress. She has decreased breath sounds (diffuse). She has wheezes (diffuse - moderate). She has no rhonchi. She has no rales.   Mild pulmonary hyperinflation   Abdominal: Soft. Bowel sounds are normal.   Musculoskeletal: She exhibits no deformity.     Vascular Status -  Her right foot exhibits no edema. Her left foot exhibits no edema.  Lymphadenopathy:        Head (right side): No submandibular adenopathy present.        Head (left side): No submandibular adenopathy present.     She has no cervical adenopathy.   Neurological: She is alert and oriented to person, place, and time. No cranial nerve deficit. She exhibits normal muscle tone. Coordination normal.   Skin: Skin is warm and dry. No rash noted. She is not diaphoretic. No cyanosis. No pallor. Nails show no clubbing.   Psychiatric: She has a normal mood and affect.     Assessment/Plan   Problems Addressed this Visit        Cardiovascular and Mediastinum    Mixed hyperlipidemia  Encouraged to continue to work on her diet and exercise plan.  Continue current medication  Fasting labs scheduled    Relevant Medications    aspirin (ASPIRIN ADULT LOW DOSE) 81 MG EC tablet    atorvastatin (LIPITOR) 40 MG tablet    Other Relevant Orders    Comprehensive Metabolic Panel    Lipid Panel    TSH       Respiratory    COPD mixed type (CMS/HCC)  With recent exacerbation likely associated with a viral respiratory tract infection  Reminded of the importance of smoking cessation  Reviewed options and agreed on a course of oral corticosteroids  Encouraged to report if any worse, any new symptoms, or if not returning to baseline over the next week    Relevant Medications    albuterol (PROVENTIL) (2.5 MG/3ML) 0.083% nebulizer solution    albuterol sulfate HFA (VENTOLIN HFA) 108 (90 Base) MCG/ACT inhaler    loratadine (CLARITIN) 10 MG tablet    mometasone-formoterol (DULERA 200) 200-5 MCG/ACT inhaler    montelukast (SINGULAIR) 10  MG tablet    Umeclidinium Bromide 62.5 MCG/INH aerosol powder     predniSONE (DELTASONE) 20 MG tablet    Other Relevant Orders    CBC & Differential    Chronic non-seasonal allergic rhinitis       Digestive    Gastroesophageal reflux disease without esophagitis   Symptoms are currently well controlled.  Reminded regarding lifestyle modification.  Continue current medication.    Relevant Medications    lansoprazole (PREVACID) 30 MG capsule       Musculoskeletal and Integument    DDD (degenerative disc disease), cervical  Reminded regarding symptomatic treatment.   Continue current medication    Relevant Medications    acetaminophen (ACETAMINOPHEN EXTRA STRENGTH) 500 MG tablet    cyclobenzaprine (FLEXERIL) 10 MG tablet    DULoxetine (CYMBALTA) 30 MG capsule    gabapentin (NEURONTIN) 600 MG tablet    Sprain of medial collateral ligament of right knee  As above.   Encouraged to follow-up with previously scheduled x-rays       Other    Chronic anxiety  Stable.  Supportive therapy.   Continue current medication.    Relevant Medications    busPIRone (BUSPAR) 10 MG tablet    DULoxetine (CYMBALTA) 30 MG capsule    hydrOXYzine pamoate (VISTARIL) 25 MG capsule    Other Relevant Orders    TSH    Healthcare maintenance  Reviewed the potential benefits and risks of hepatitis A immunizations. Patient will consider.  Patient remains uninterested in any form of cancer screening at present    Smoker

## 2019-03-11 DIAGNOSIS — M50.30 DDD (DEGENERATIVE DISC DISEASE), CERVICAL: ICD-10-CM

## 2019-03-11 DIAGNOSIS — F41.9 ANXIETY: ICD-10-CM

## 2019-03-11 DIAGNOSIS — J44.9 CHRONIC OBSTRUCTIVE PULMONARY DISEASE, UNSPECIFIED COPD TYPE (HCC): ICD-10-CM

## 2019-03-11 RX ORDER — ALBUTEROL SULFATE 90 UG/1
AEROSOL, METERED RESPIRATORY (INHALATION)
Qty: 18 G | Refills: 5 | Status: SHIPPED | OUTPATIENT
Start: 2019-03-11 | End: 2019-10-11 | Stop reason: SDUPTHER

## 2019-03-11 RX ORDER — CYCLOBENZAPRINE HCL 10 MG
TABLET ORAL
Qty: 90 TABLET | Refills: 5 | Status: SHIPPED | OUTPATIENT
Start: 2019-03-11 | End: 2019-10-11

## 2019-03-11 RX ORDER — BUSPIRONE HYDROCHLORIDE 10 MG/1
TABLET ORAL
Qty: 90 TABLET | Refills: 5 | Status: SHIPPED | OUTPATIENT
Start: 2019-03-11 | End: 2019-10-11

## 2019-03-11 RX ORDER — HYDROXYZINE PAMOATE 25 MG/1
CAPSULE ORAL
Qty: 90 CAPSULE | Refills: 5 | Status: SHIPPED | OUTPATIENT
Start: 2019-03-11 | End: 2019-10-11

## 2019-08-20 DIAGNOSIS — M50.30 DDD (DEGENERATIVE DISC DISEASE), CERVICAL: ICD-10-CM

## 2019-08-20 RX ORDER — GABAPENTIN 600 MG/1
TABLET ORAL
Qty: 90 TABLET | Refills: 0 | Status: SHIPPED | OUTPATIENT
Start: 2019-08-20 | End: 2019-09-20 | Stop reason: SDUPTHER

## 2019-09-20 DIAGNOSIS — M50.30 DDD (DEGENERATIVE DISC DISEASE), CERVICAL: ICD-10-CM

## 2019-09-21 RX ORDER — GABAPENTIN 600 MG/1
TABLET ORAL
Qty: 90 TABLET | Refills: 0 | Status: SHIPPED | OUTPATIENT
Start: 2019-09-21 | End: 2019-10-11 | Stop reason: SDUPTHER

## 2019-10-11 ENCOUNTER — OFFICE VISIT (OUTPATIENT)
Dept: FAMILY MEDICINE CLINIC | Facility: CLINIC | Age: 49
End: 2019-10-11

## 2019-10-11 DIAGNOSIS — F41.9 ANXIETY: ICD-10-CM

## 2019-10-11 DIAGNOSIS — M25.562 ACUTE PAIN OF LEFT KNEE: ICD-10-CM

## 2019-10-11 DIAGNOSIS — M50.30 DDD (DEGENERATIVE DISC DISEASE), CERVICAL: ICD-10-CM

## 2019-10-11 DIAGNOSIS — F41.9 CHRONIC ANXIETY: ICD-10-CM

## 2019-10-11 DIAGNOSIS — Z23 ENCOUNTER FOR IMMUNIZATION: ICD-10-CM

## 2019-10-11 DIAGNOSIS — J44.9 COPD MIXED TYPE (HCC): ICD-10-CM

## 2019-10-11 DIAGNOSIS — F17.200 SMOKER: ICD-10-CM

## 2019-10-11 DIAGNOSIS — K21.9 GASTROESOPHAGEAL REFLUX DISEASE WITHOUT ESOPHAGITIS: ICD-10-CM

## 2019-10-11 DIAGNOSIS — J30.9 CHRONIC ALLERGIC RHINITIS: ICD-10-CM

## 2019-10-11 DIAGNOSIS — Z00.00 HEALTHCARE MAINTENANCE: ICD-10-CM

## 2019-10-11 DIAGNOSIS — J44.9 CHRONIC OBSTRUCTIVE PULMONARY DISEASE, UNSPECIFIED COPD TYPE (HCC): ICD-10-CM

## 2019-10-11 DIAGNOSIS — J30.89 CHRONIC NON-SEASONAL ALLERGIC RHINITIS: ICD-10-CM

## 2019-10-11 DIAGNOSIS — E78.2 MIXED HYPERLIPIDEMIA: Primary | ICD-10-CM

## 2019-10-11 PROCEDURE — 96372 THER/PROPH/DIAG INJ SC/IM: CPT | Performed by: GENERAL PRACTICE

## 2019-10-11 PROCEDURE — 90674 CCIIV4 VAC NO PRSV 0.5 ML IM: CPT | Performed by: GENERAL PRACTICE

## 2019-10-11 PROCEDURE — 90471 IMMUNIZATION ADMIN: CPT | Performed by: GENERAL PRACTICE

## 2019-10-11 PROCEDURE — 99214 OFFICE O/P EST MOD 30 MIN: CPT | Performed by: GENERAL PRACTICE

## 2019-10-11 RX ORDER — MELOXICAM 15 MG/1
15 TABLET ORAL DAILY
Qty: 30 TABLET | Refills: 0 | Status: SHIPPED | OUTPATIENT
Start: 2019-10-11 | End: 2020-03-05

## 2019-10-11 RX ORDER — METHYLPREDNISOLONE ACETATE 80 MG/ML
80 INJECTION, SUSPENSION INTRA-ARTICULAR; INTRALESIONAL; INTRAMUSCULAR; SOFT TISSUE ONCE
Status: COMPLETED | OUTPATIENT
Start: 2019-10-11 | End: 2019-10-11

## 2019-10-11 RX ORDER — DULOXETIN HYDROCHLORIDE 30 MG/1
30 CAPSULE, DELAYED RELEASE ORAL DAILY
Qty: 30 CAPSULE | Refills: 5 | Status: SHIPPED | OUTPATIENT
Start: 2019-10-11 | End: 2019-11-16 | Stop reason: SDUPTHER

## 2019-10-11 RX ORDER — ACETAMINOPHEN 500 MG
1000 TABLET ORAL EVERY 6 HOURS PRN
Qty: 180 TABLET | Refills: 5 | Status: SHIPPED | OUTPATIENT
Start: 2019-10-11 | End: 2020-03-05 | Stop reason: SDUPTHER

## 2019-10-11 RX ORDER — ASPIRIN 81 MG/1
81 TABLET ORAL DAILY
Qty: 30 TABLET | Refills: 5 | Status: SHIPPED | OUTPATIENT
Start: 2019-10-11 | End: 2020-03-05 | Stop reason: SDUPTHER

## 2019-10-11 RX ORDER — BUSPIRONE HYDROCHLORIDE 10 MG/1
10 TABLET ORAL 3 TIMES DAILY
Qty: 90 TABLET | Refills: 5 | Status: SHIPPED | OUTPATIENT
Start: 2019-10-11 | End: 2020-03-05 | Stop reason: SDUPTHER

## 2019-10-11 RX ORDER — LORATADINE 10 MG/1
10 TABLET ORAL DAILY PRN
Qty: 30 TABLET | Refills: 5 | Status: SHIPPED | OUTPATIENT
Start: 2019-10-11 | End: 2020-03-05 | Stop reason: SDUPTHER

## 2019-10-11 RX ORDER — MONTELUKAST SODIUM 10 MG/1
10 TABLET ORAL DAILY
Qty: 30 TABLET | Refills: 5 | Status: SHIPPED | OUTPATIENT
Start: 2019-10-11 | End: 2019-11-16 | Stop reason: SDUPTHER

## 2019-10-11 RX ORDER — ALBUTEROL SULFATE 90 UG/1
2 AEROSOL, METERED RESPIRATORY (INHALATION) EVERY 4 HOURS PRN
Qty: 18 G | Refills: 5 | Status: SHIPPED | OUTPATIENT
Start: 2019-10-11 | End: 2020-03-05 | Stop reason: SDUPTHER

## 2019-10-11 RX ORDER — ALBUTEROL SULFATE 90 UG/1
2 AEROSOL, METERED RESPIRATORY (INHALATION) EVERY 4 HOURS PRN
Qty: 18 G | Refills: 5 | Status: SHIPPED | OUTPATIENT
Start: 2019-10-11 | End: 2019-11-12

## 2019-10-11 RX ORDER — HYDROXYZINE PAMOATE 25 MG/1
25 CAPSULE ORAL 3 TIMES DAILY PRN
Qty: 90 CAPSULE | Refills: 5 | Status: SHIPPED | OUTPATIENT
Start: 2019-10-11 | End: 2019-11-16 | Stop reason: SDUPTHER

## 2019-10-11 RX ORDER — GABAPENTIN 600 MG/1
600 TABLET ORAL 3 TIMES DAILY
Qty: 90 TABLET | Refills: 3 | Status: SHIPPED | OUTPATIENT
Start: 2019-10-11 | End: 2020-02-12

## 2019-10-11 RX ORDER — ATORVASTATIN CALCIUM 40 MG/1
40 TABLET, FILM COATED ORAL NIGHTLY
Qty: 30 TABLET | Refills: 5 | Status: SHIPPED | OUTPATIENT
Start: 2019-10-11 | End: 2019-11-16 | Stop reason: SDUPTHER

## 2019-10-11 RX ORDER — CYCLOBENZAPRINE HCL 10 MG
10 TABLET ORAL 3 TIMES DAILY
Qty: 90 TABLET | Refills: 5 | Status: SHIPPED | OUTPATIENT
Start: 2019-10-11 | End: 2020-03-05 | Stop reason: SDUPTHER

## 2019-10-11 RX ORDER — LANSOPRAZOLE 30 MG/1
30 CAPSULE, DELAYED RELEASE ORAL 2 TIMES DAILY
Qty: 60 CAPSULE | Refills: 5 | Status: SHIPPED | OUTPATIENT
Start: 2019-10-11 | End: 2019-11-16 | Stop reason: SDUPTHER

## 2019-10-11 RX ORDER — ALBUTEROL SULFATE 2.5 MG/3ML
2.5 SOLUTION RESPIRATORY (INHALATION) EVERY 4 HOURS PRN
Qty: 180 VIAL | Refills: 5 | Status: SHIPPED | OUTPATIENT
Start: 2019-10-11 | End: 2020-03-05 | Stop reason: SDUPTHER

## 2019-10-11 RX ADMIN — METHYLPREDNISOLONE ACETATE 80 MG: 80 INJECTION, SUSPENSION INTRA-ARTICULAR; INTRALESIONAL; INTRAMUSCULAR; SOFT TISSUE at 16:52

## 2019-10-11 NOTE — PROGRESS NOTES
Deonte Acosta is a 49 y.o. female.     History of Present Illness     Bilateral knee Pain  She returns with a several week history of left knee pain.  There is no history of any strain or trauma however she has been doing more stairs over this period.  The pain is described as a sharp anterior lateral ache worse with weightbearing.  The pain does not radiate elsewhere but has been associated with intermittent swelling and a sense of giving way.  There is no history of any stiffness or locking.  She has been using a sleeve with a patellar cut out over the last week and feels that it has helped some.  The pain is similar to that she has had on the right.  She has yet to follow-up with plain films arranged on that side    Neck Pain  Symptoms have been present for a number of years and are unchanged. The pain is located in the posterior neck bilaterally and radiates to the left posterior shoulder. The pain is described as sharp and stiffness and occurs intermittently. She rates her pain as moderate. Symptoms are exacerbated by any movement and prolonged posture. Symptoms are improved by gentle exercise/stretches, heat and cyclobenzaprine and gabapentin. She has tingling about the left shoulder associated with the neck pain. She denies any weakness in the right arm, weakness in the left arm, tingling in the right arm, change in bladder function, change in bowel function, urinary incontinence and bowel incontinence. MRI of the cervical spine performed on 6/29/15 was reported as showing DDD and OA    Anxiety  She has a history of the following anxiety symptoms: nervousness, worrying, insomnia, fatigue, feelings of losing control. Onset of symptoms was a number of years ago.  Symptoms have been intermittent since lthen. She denies current suicidal and homicidal ideation. Risk factors: negative life event disappearance of her son. Treatment has included medication duloxetine, buspirone and hydroxyzine. She  denies any apparent side effects    Dyslipidemia  Compliance with treatment has been fair. The patient exercises intermittently. She is currently being prescribed the following medication for her dyslipidemia - atorvastatin, omega 3 fatty acids. Patient denies side effects associated with her medications.  She has yet to follow-up with lab work arranged at her last visit    COPD  The patient has a history of SOB, cough and wheezing. These symptoms have been worse over the last 3-4 days. There had been no associated symptoms and she denies any nasal congestion, chest pain, hemoptysis, fever, or chills. She reports that her symptoms become worse with activity and exposure to cold air. Her symptoms are reduced with rest and with inhaled inhaled medication. She has had multiple admissions to hospital for apparent exacerbations associated with pneumonia. She is following the treatment plan, including medications as directed. She has repeatedly missed appointments for a CT of the chest, PFT and a pulmonology assessment.  She is currently smoking 1/2 pack/day.    GERD  Patient has a history of heartburn and bitter taste in mouth. Symptoms have been present for a number of years .The patient denies abdominal bloating, dysphagia, hematemesis, melena and unexpected weight loss. Symptoms appear to be worsened by large meals, lying down and fatty foods. Risk factors present for GERD include tobacco abuse, caffeine use and obesity. Risk factors absent for GERD are alcohol use and NSAID use. Studies performed so far include none. Treatments tried so far include proton pump inhibitor: lansoprazole. Results of treatment: good control of her symptoms but only if she takes it twice daily    The following portions of the patient's history were reviewed and updated as appropriate: allergies, current medications, past medical history, past social history and problem list.    Review of Systems   Constitutional: Positive for fatigue.  Negative for appetite change, chills, fever and unexpected weight change.   HENT: Positive for rhinorrhea and sneezing. Negative for congestion, ear pain, postnasal drip, sore throat and voice change.    Eyes: Negative for itching and visual disturbance.   Respiratory: Positive for cough, shortness of breath and wheezing.    Cardiovascular: Negative for chest pain, palpitations and leg swelling.   Gastrointestinal: Negative for abdominal pain, blood in stool, constipation, diarrhea, nausea and vomiting.        Occasional heartburn   Endocrine: Negative for polydipsia.   Genitourinary: Negative for difficulty urinating, dysuria, frequency, hematuria, menstrual problem, pelvic pain, urgency, vaginal bleeding and vaginal discharge.   Musculoskeletal: Positive for arthralgias and neck pain. Negative for back pain, joint swelling and myalgias.   Skin: Negative for color change.   Neurological: Positive for numbness (left lateral neck and posterior shoulder). Negative for tremors, weakness and headaches.   Psychiatric/Behavioral: Positive for sleep disturbance. Negative for dysphoric mood and suicidal ideas. The patient is nervous/anxious.      Objective   Physical Exam   Constitutional: She is oriented to person, place, and time. No distress.   Appeared older then stated age. Bright and in good spirits.  Mild antalgic gait.  No apparent distress at rest. No pallor, jaundice, diaphoresis, or cyanosis.     HENT:   Head: Atraumatic.   Right Ear: Tympanic membrane, external ear and ear canal normal.   Left Ear: Tympanic membrane, external ear and ear canal normal.   Nose: Nose normal.   Mouth/Throat: Oropharynx is clear and moist. Mucous membranes are not pale and not cyanotic. Abnormal dentition (adenticulate).   Eyes: EOM are normal. Pupils are equal, round, and reactive to light. No scleral icterus.   Neck: No JVD present. Carotid bruit is not present. No tracheal deviation present. No thyromegaly present.    Cardiovascular: Normal rate, regular rhythm, S1 normal, S2 normal and intact distal pulses. Exam reveals no gallop, no S3 and no S4.   No murmur heard.  Pulmonary/Chest: No stridor. No respiratory distress. She has decreased breath sounds (diffuse). She has wheezes (diffuse - moderate). She has no rhonchi. She has no rales.   Mild pulmonary hyperinflation   Abdominal: Soft. Bowel sounds are normal.   Musculoskeletal: She exhibits no deformity.        Left knee: She exhibits swelling. She exhibits normal range of motion, no effusion, no deformity, no erythema and normal patellar mobility. Tenderness found. Lateral joint line (and the lateral edge of the patella) tenderness noted. No LCL tenderness noted.     Vascular Status -  Her right foot exhibits no edema. Her left foot exhibits no edema.  Lymphadenopathy:        Head (right side): No submandibular adenopathy present.        Head (left side): No submandibular adenopathy present.     She has no cervical adenopathy.   Neurological: She is alert and oriented to person, place, and time. No cranial nerve deficit. She exhibits normal muscle tone. Coordination normal.   Skin: Skin is warm and dry. No rash noted. She is not diaphoretic. No cyanosis. No pallor. Nails show no clubbing.   Psychiatric: She has a normal mood and affect.     Assessment/Plan   Problems Addressed this Visit        Cardiovascular and Mediastinum    Mixed hyperlipidemia  Encouraged to continue to work on her diet and exercise plan.  Continue current medication  Reminded to follow-up with her previously scheduled lab work    Relevant Medications    aspirin (ASPIRIN ADULT LOW DOSE) 81 MG EC tablet    atorvastatin (LIPITOR) 40 MG tablet       Respiratory    COPD mixed type (CMS/HCC)   COPD is stable.  Reminded of the importance of smoking cessation  Encouraged to remain as active as symptoms allow for  Continue current medication    Relevant Medications    albuterol (PROVENTIL) (2.5 MG/3ML) 0.083%  nebulizer solution    albuterol sulfate HFA (VENTOLIN HFA) 108 (90 Base) MCG/ACT inhaler    albuterol sulfate HFA (VENTOLIN HFA) 108 (90 Base) MCG/ACT inhaler    loratadine (CLARITIN) 10 MG tablet    mometasone-formoterol (DULERA 200) 200-5 MCG/ACT inhaler    montelukast (SINGULAIR) 10 MG tablet    Umeclidinium Bromide 62.5 MCG/INH aerosol powder     Chronic non-seasonal allergic rhinitis    Relevant Medications    meloxicam (MOBIC) 15 MG tablet       Digestive    Gastroesophageal reflux disease without esophagitis   Symptoms are currently well controlled.  Reminded regarding lifestyle modification.  Continue current medication.    Relevant Medications    lansoprazole (PREVACID) 30 MG capsule       Musculoskeletal and Integument    DDD (degenerative disc disease), cervical  Reminded regarding symptomatic treatment    Relevant Medications    methylPREDNISolone acetate (DEPO-medrol) injection 80 mg (Completed)    meloxicam (MOBIC) 15 MG tablet    acetaminophen (ACETAMINOPHEN EXTRA STRENGTH) 500 MG tablet    cyclobenzaprine (FLEXERIL) 10 MG tablet    DULoxetine (CYMBALTA) 30 MG capsule    gabapentin (NEURONTIN) 600 MG tablet    Acute pain of left knee  Osteoarthritis with recent overuse  Reviewed options going forward  Patient will continue to wear her sleeve  Agreed on a corticosteroid injection today on with an oral NSAID for 3 to 4 weeks  Encouraged to report if any worse, any new symptoms, or if not significantly better over the next 3 to 4 weeks    Relevant Medications    methylPREDNISolone acetate (DEPO-medrol) injection 80 mg (Completed)    meloxicam (MOBIC) 15 MG tablet       Other    Chronic anxiety  Significant situational component.   Supportive therapy.   Continue current medication.    Relevant Medications    busPIRone (BUSPAR) 10 MG tablet    DULoxetine (CYMBALTA) 30 MG capsule    hydrOXYzine pamoate (VISTARIL) 25 MG capsule    Healthcare maintenance  Recommended a flu shot  Patient remains uninterested  in breast or colon cancer screening at present    Relevant Orders    Flucelvax Quad=>4Years (PFS) (Completed)    Smoker    Encounter for immunization    Relevant Orders    Flucelvax Quad=>4Years (PFS) (Completed)

## 2019-10-12 VITALS
OXYGEN SATURATION: 96 % | TEMPERATURE: 98.3 F | HEIGHT: 64 IN | BODY MASS INDEX: 35.51 KG/M2 | HEART RATE: 89 BPM | RESPIRATION RATE: 14 BRPM | WEIGHT: 208 LBS | SYSTOLIC BLOOD PRESSURE: 120 MMHG | DIASTOLIC BLOOD PRESSURE: 70 MMHG

## 2019-11-12 ENCOUNTER — OFFICE VISIT (OUTPATIENT)
Dept: FAMILY MEDICINE CLINIC | Facility: CLINIC | Age: 49
End: 2019-11-12

## 2019-11-12 VITALS
DIASTOLIC BLOOD PRESSURE: 72 MMHG | HEIGHT: 64 IN | OXYGEN SATURATION: 96 % | TEMPERATURE: 98.7 F | BODY MASS INDEX: 35.34 KG/M2 | SYSTOLIC BLOOD PRESSURE: 126 MMHG | HEART RATE: 90 BPM | WEIGHT: 207 LBS

## 2019-11-12 DIAGNOSIS — E66.01 MORBIDLY OBESE (HCC): ICD-10-CM

## 2019-11-12 DIAGNOSIS — J44.9 COPD MIXED TYPE (HCC): ICD-10-CM

## 2019-11-12 DIAGNOSIS — E78.2 MIXED HYPERLIPIDEMIA: ICD-10-CM

## 2019-11-12 DIAGNOSIS — H61.23 BILATERAL IMPACTED CERUMEN: ICD-10-CM

## 2019-11-12 DIAGNOSIS — K21.9 GASTROESOPHAGEAL REFLUX DISEASE WITHOUT ESOPHAGITIS: ICD-10-CM

## 2019-11-12 DIAGNOSIS — J03.90 TONSILLITIS: Primary | ICD-10-CM

## 2019-11-12 PROCEDURE — 99214 OFFICE O/P EST MOD 30 MIN: CPT | Performed by: PHYSICIAN ASSISTANT

## 2019-11-12 PROCEDURE — 69210 REMOVE IMPACTED EAR WAX UNI: CPT | Performed by: PHYSICIAN ASSISTANT

## 2019-11-12 RX ORDER — CEFDINIR 300 MG/1
600 CAPSULE ORAL DAILY
Qty: 20 CAPSULE | Refills: 0 | Status: SHIPPED | OUTPATIENT
Start: 2019-11-12 | End: 2019-11-22

## 2019-11-13 PROBLEM — E66.01 MORBIDLY OBESE (HCC): Status: ACTIVE | Noted: 2019-11-13

## 2019-11-13 NOTE — PROGRESS NOTES
Deonte Acosta is a 49 y.o. female.       Chief Complaint -sore throat    History of Present Illness -      Sore throat-  She complains of sore throat runny nose body aches and chills as well as ear congestion and trouble hearing for the last 4 days.  Minimal relief with aspirin.    Hyperlipidemia-improved with Lipitor  Lab Results   Component Value Date    CHOL 172 02/12/2018    CHOL 184 09/21/2017     Lab Results   Component Value Date    TRIG 177 (H) 02/12/2018    TRIG 122 09/21/2017     Lab Results   Component Value Date    HDL 43 (L) 02/12/2018    HDL 47 (L) 09/21/2017     Lab Results   Component Value Date    LDL 94 02/12/2018     (H) 09/21/2017     COPD-not at goal due to acute illness.  She complains of continued chronic cough.  She is currently using albuterol and Dulera    Gastroesophageal reflux disease-stable with Prevacid    Morbid obesity-not at goal.  She does not currently have a regular exercise regimen or monitor diet program.    The following portions of the patient's history were reviewed and updated as appropriate: allergies, current medications, past family history, past medical history, past social history, past surgical history and problem list.    Review of Systems   Constitutional: Positive for chills. Negative for activity change and appetite change.   HENT: Positive for congestion (ears), rhinorrhea and sore throat. Negative for ear pain and sinus pressure.    Eyes: Negative for pain and visual disturbance.   Respiratory: Negative for cough and chest tightness.    Cardiovascular: Negative for chest pain and palpitations.   Gastrointestinal: Negative for abdominal pain, constipation, diarrhea, nausea and vomiting.   Endocrine: Negative for polydipsia and polyuria.   Genitourinary: Negative for dysuria and frequency.   Musculoskeletal: Negative for back pain and myalgias.   Skin: Negative for color change and rash.   Allergic/Immunologic: Negative for food allergies and  "immunocompromised state.   Neurological: Negative for dizziness, syncope and headaches.   Hematological: Negative for adenopathy. Does not bruise/bleed easily.   Psychiatric/Behavioral: Negative for hallucinations and suicidal ideas. The patient is not nervous/anxious.        /72   Pulse 90   Temp 98.7 °F (37.1 °C) (Oral)   Ht 161.3 cm (63.5\")   Wt 93.9 kg (207 lb)   SpO2 96%   BMI 36.09 kg/m²   Office Visit on 02/12/2018   Component Date Value Ref Range Status   • Glucose 02/12/2018 95  70 - 110 mg/dL Final   • BUN 02/12/2018 9  7 - 21 mg/dL Final   • Creatinine 02/12/2018 0.99  0.43 - 1.29 mg/dL Final   • Sodium 02/12/2018 143  135 - 153 mmol/L Final   • Potassium 02/12/2018 4.2  3.5 - 5.3 mmol/L Final   • Chloride 02/12/2018 107  99 - 112 mmol/L Final   • CO2 02/12/2018 28.8  24.3 - 31.9 mmol/L Final   • Calcium 02/12/2018 9.2  7.7 - 10.0 mg/dL Final   • Total Protein 02/12/2018 7.2  6.0 - 8.0 g/dL Final   • Albumin 02/12/2018 4.30  3.50 - 5.00 g/dL Final   • ALT (SGPT) 02/12/2018 37* 10 - 36 U/L Final   • AST (SGOT) 02/12/2018 30  10 - 30 U/L Final   • Alkaline Phosphatase 02/12/2018 107* 35 - 104 U/L Final   • Total Bilirubin 02/12/2018 0.5  0.2 - 1.8 mg/dL Final   • eGFR Non African Amer 02/12/2018 60* >60 mL/min/1.73 Final   • Globulin 02/12/2018 2.9  gm/dL Final   • A/G Ratio 02/12/2018 1.5  1.5 - 2.5 g/dL Final   • BUN/Creatinine Ratio 02/12/2018 9.1  7.0 - 25.0 Final   • Anion Gap 02/12/2018 7.2  3.6 - 11.2 mmol/L Final   • Total Cholesterol 02/12/2018 172  0 - 200 mg/dL Final   • Triglycerides 02/12/2018 177* 0 - 150 mg/dL Final   • HDL Cholesterol 02/12/2018 43* 60 - 100 mg/dL Final   • LDL Cholesterol  02/12/2018 94  0 - 100 mg/dL Final   • VLDL Cholesterol 02/12/2018 35.4  mg/dL Final   • LDL/HDL Ratio 02/12/2018 2.18   Final   • WBC 02/12/2018 6.71  4.50 - 12.50 10*3/mm3 Final   • RBC 02/12/2018 4.62  4.20 - 5.40 10*6/mm3 Final   • Hemoglobin 02/12/2018 13.4  12.0 - 16.0 g/dL Final   • " Hematocrit 02/12/2018 41.3  37.0 - 47.0 % Final   • MCV 02/12/2018 89.4  80.0 - 94.0 fL Final   • MCH 02/12/2018 29.0  27.0 - 33.0 pg Final   • MCHC 02/12/2018 32.4* 33.0 - 37.0 g/dL Final   • RDW 02/12/2018 16.1* 11.5 - 14.5 % Final   • RDW-SD 02/12/2018 51.9  37.0 - 54.0 fl Final   • MPV 02/12/2018 11.0* 6.0 - 10.0 fL Final   • Platelets 02/12/2018 284  130 - 400 10*3/mm3 Final   • Neutrophil % 02/12/2018 56.4  30.0 - 70.0 % Final   • Lymphocyte % 02/12/2018 30.4  21.0 - 51.0 % Final   • Monocyte % 02/12/2018 10.3* 0.0 - 10.0 % Final   • Eosinophil % 02/12/2018 2.5  0.0 - 5.0 % Final   • Basophil % 02/12/2018 0.3  0.0 - 2.0 % Final   • Immature Grans % 02/12/2018 0.1  0.0 - 0.5 % Final   • Neutrophils, Absolute 02/12/2018 3.78  1.40 - 6.50 10*3/mm3 Final   • Lymphocytes, Absolute 02/12/2018 2.04  1.00 - 3.00 10*3/mm3 Final   • Monocytes, Absolute 02/12/2018 0.69  0.10 - 0.90 10*3/mm3 Final   • Eosinophils, Absolute 02/12/2018 0.17  0.00 - 0.70 10*3/mm3 Final   • Basophils, Absolute 02/12/2018 0.02  0.00 - 0.30 10*3/mm3 Final   • Immature Grans, Absolute 02/12/2018 0.01  0.00 - 0.03 10*3/mm3 Final   • Osmolality Calc 02/12/2018 283.5  273.0 - 305.0 mOsm/kg Final       Physical Exam   Constitutional: She is oriented to person, place, and time. She appears well-developed and well-nourished.   HENT:   Head: Normocephalic and atraumatic.   Nose: Nose normal.   Cerumen impaction noted in bilateral external ear canals.  TMs appear slightly erythematous without bulging after wax was removed.  Oropharynx erythematous with exudate.   Eyes: Conjunctivae and EOM are normal. Pupils are equal, round, and reactive to light.   Neck: Normal range of motion. Neck supple. No tracheal deviation present. No thyromegaly present.   Cardiovascular: Normal rate, regular rhythm, normal heart sounds and intact distal pulses.   No murmur heard.  Pulmonary/Chest: Effort normal and breath sounds normal. No respiratory distress. She has no  wheezes.   Abdominal: Soft. Bowel sounds are normal. There is no tenderness. There is no guarding.   Musculoskeletal: Normal range of motion. She exhibits no edema or tenderness.   Lymphadenopathy:     She has no cervical adenopathy.   Neurological: She is alert and oriented to person, place, and time.   Skin: Skin is warm and dry. No rash noted.   Psychiatric: She has a normal mood and affect. Her behavior is normal.   Nursing note and vitals reviewed.      Assessment/Plan     Diagnoses and all orders for this visit:    Tonsillitis  Comments:  Start cefdinir  Orders:  -     cefdinir (OMNICEF) 300 MG capsule; Take 2 capsules by mouth Daily for 10 days.    Mixed hyperlipidemia  Comments:  Continue Lipitor    COPD mixed type (CMS/HCC)  Comments:  Continue Dulera and albuterol    Gastroesophageal reflux disease without esophagitis  Comments:  Continue Prevacid    Morbidly obese (CMS/HCC)  Comments:  Advised patient to start 30 minutes of cardiovascular activity/exercise daily as well as low-cholesterol diet    Bilateral impacted cerumen  Comments:  Cerumen removed from bilateral external ear canals using flex loop curette and Nagi GARCIA.  Patient tolerated procedure well.                 This document has been electronically signed by:  Kelsey Lazar PA-C

## 2019-11-15 DIAGNOSIS — F41.9 CHRONIC ANXIETY: ICD-10-CM

## 2019-11-15 DIAGNOSIS — M50.30 DDD (DEGENERATIVE DISC DISEASE), CERVICAL: ICD-10-CM

## 2019-11-15 DIAGNOSIS — F41.9 ANXIETY: ICD-10-CM

## 2019-11-15 DIAGNOSIS — E78.2 MIXED HYPERLIPIDEMIA: ICD-10-CM

## 2019-11-15 DIAGNOSIS — K21.9 GASTROESOPHAGEAL REFLUX DISEASE WITHOUT ESOPHAGITIS: ICD-10-CM

## 2019-11-15 DIAGNOSIS — J44.9 CHRONIC OBSTRUCTIVE PULMONARY DISEASE, UNSPECIFIED COPD TYPE (HCC): ICD-10-CM

## 2019-11-16 RX ORDER — DULOXETIN HYDROCHLORIDE 30 MG/1
30 CAPSULE, DELAYED RELEASE ORAL DAILY
Qty: 30 CAPSULE | Refills: 5 | Status: SHIPPED | OUTPATIENT
Start: 2019-11-16 | End: 2020-03-05 | Stop reason: SDUPTHER

## 2019-11-16 RX ORDER — HYDROXYZINE PAMOATE 25 MG/1
CAPSULE ORAL
Qty: 90 CAPSULE | Refills: 5 | Status: SHIPPED | OUTPATIENT
Start: 2019-11-16 | End: 2020-03-05 | Stop reason: SDUPTHER

## 2019-11-16 RX ORDER — MONTELUKAST SODIUM 10 MG/1
10 TABLET ORAL DAILY
Qty: 30 TABLET | Refills: 5 | Status: SHIPPED | OUTPATIENT
Start: 2019-11-16 | End: 2020-03-05 | Stop reason: SDUPTHER

## 2019-11-16 RX ORDER — ATORVASTATIN CALCIUM 40 MG/1
TABLET, FILM COATED ORAL
Qty: 30 TABLET | Refills: 5 | Status: SHIPPED | OUTPATIENT
Start: 2019-11-16 | End: 2020-03-05 | Stop reason: SDUPTHER

## 2019-11-16 RX ORDER — LANSOPRAZOLE 30 MG/1
CAPSULE, DELAYED RELEASE ORAL
Qty: 60 CAPSULE | Refills: 5 | Status: SHIPPED | OUTPATIENT
Start: 2019-11-16 | End: 2020-03-05 | Stop reason: SDUPTHER

## 2020-02-12 DIAGNOSIS — M50.30 DDD (DEGENERATIVE DISC DISEASE), CERVICAL: ICD-10-CM

## 2020-02-12 RX ORDER — GABAPENTIN 600 MG/1
TABLET ORAL
Qty: 90 TABLET | Refills: 0 | Status: SHIPPED | OUTPATIENT
Start: 2020-02-12 | End: 2020-03-05 | Stop reason: SDUPTHER

## 2020-02-20 ENCOUNTER — OFFICE VISIT (OUTPATIENT)
Dept: FAMILY MEDICINE CLINIC | Facility: CLINIC | Age: 50
End: 2020-02-20

## 2020-02-20 VITALS
OXYGEN SATURATION: 93 % | TEMPERATURE: 98.6 F | BODY MASS INDEX: 35.34 KG/M2 | HEART RATE: 85 BPM | WEIGHT: 207 LBS | DIASTOLIC BLOOD PRESSURE: 80 MMHG | SYSTOLIC BLOOD PRESSURE: 120 MMHG | HEIGHT: 64 IN

## 2020-02-20 DIAGNOSIS — R68.89 FLU-LIKE SYMPTOMS: Primary | ICD-10-CM

## 2020-02-20 DIAGNOSIS — F17.200 SMOKER: ICD-10-CM

## 2020-02-20 DIAGNOSIS — E66.01 MORBIDLY OBESE (HCC): ICD-10-CM

## 2020-02-20 LAB
EXPIRATION DATE: ABNORMAL
FLUAV AG NPH QL: POSITIVE
FLUBV AG NPH QL: NEGATIVE
INTERNAL CONTROL: ABNORMAL
Lab: ABNORMAL

## 2020-02-20 PROCEDURE — 99213 OFFICE O/P EST LOW 20 MIN: CPT | Performed by: NURSE PRACTITIONER

## 2020-02-20 PROCEDURE — 87804 INFLUENZA ASSAY W/OPTIC: CPT | Performed by: NURSE PRACTITIONER

## 2020-02-20 RX ORDER — GUAIFENESIN/DEXTROMETHORPHAN 100-10MG/5
5 SYRUP ORAL 3 TIMES DAILY PRN
Qty: 236 ML | Refills: 0 | Status: SHIPPED | OUTPATIENT
Start: 2020-02-20 | End: 2020-03-05

## 2020-02-20 RX ORDER — IBUPROFEN 600 MG/1
600 TABLET ORAL EVERY 6 HOURS PRN
Qty: 30 TABLET | Refills: 0 | Status: SHIPPED | OUTPATIENT
Start: 2020-02-20 | End: 2020-03-05

## 2020-02-20 RX ORDER — OSELTAMIVIR PHOSPHATE 75 MG/1
75 CAPSULE ORAL 2 TIMES DAILY
Qty: 10 CAPSULE | Refills: 0 | Status: SHIPPED | OUTPATIENT
Start: 2020-02-20 | End: 2020-03-05

## 2020-02-20 RX ORDER — ONDANSETRON 8 MG/1
8 TABLET, ORALLY DISINTEGRATING ORAL EVERY 8 HOURS PRN
Qty: 30 TABLET | Refills: 0 | Status: SHIPPED | OUTPATIENT
Start: 2020-02-20 | End: 2020-03-05

## 2020-02-20 NOTE — PROGRESS NOTES
"Deonte Acosta is a 49 y.o. female.     Chief Complaint   Patient presents with   • URI     Cold symptoms x2 days  History of Present Illness:    Patient reports that she has had cough, stuffy head, nausea, chills and body aches x2 days.      The following portions of the patient's history and ROS were reviewed and updated as appropriate per provider:  Allergies, current medications, past family history, past medical history, past social history, past surgical history and problem list.    Review of Systems   Constitutional: Positive for activity change, appetite change, chills, fatigue and fever.   HENT: Positive for congestion, sinus pressure, sinus pain and sore throat. Negative for ear pain, nosebleeds, rhinorrhea and trouble swallowing.    Eyes: Negative.    Respiratory: Positive for cough and chest tightness. Negative for apnea, shortness of breath and stridor.    Cardiovascular: Negative for chest pain, palpitations and leg swelling.   Gastrointestinal: Positive for nausea. Negative for abdominal pain, constipation, diarrhea and vomiting.   Endocrine: Negative.    Genitourinary: Negative for difficulty urinating, dysuria, frequency and hematuria.   Musculoskeletal: Positive for arthralgias. Negative for gait problem, joint swelling and neck stiffness.   Skin: Negative.    Allergic/Immunologic: Positive for environmental allergies. Negative for immunocompromised state.   Psychiatric/Behavioral: Positive for sleep disturbance. Negative for dysphoric mood, self-injury and suicidal ideas.       Objective     /80   Pulse 85   Temp 98.6 °F (37 °C) (Temporal)   Ht 161.3 cm (63.5\")   Wt 93.9 kg (207 lb)   SpO2 93%   BMI 36.09 kg/m²   Office Visit on 02/12/2018   Component Date Value Ref Range Status   • Glucose 02/12/2018 95  70 - 110 mg/dL Final   • BUN 02/12/2018 9  7 - 21 mg/dL Final   • Creatinine 02/12/2018 0.99  0.43 - 1.29 mg/dL Final   • Sodium 02/12/2018 143  135 - 153 mmol/L Final   "   • Potassium 02/12/2018 4.2  3.5 - 5.3 mmol/L Final   • Chloride 02/12/2018 107  99 - 112 mmol/L Final   • CO2 02/12/2018 28.8  24.3 - 31.9 mmol/L Final   • Calcium 02/12/2018 9.2  7.7 - 10.0 mg/dL Final   • Total Protein 02/12/2018 7.2  6.0 - 8.0 g/dL Final   • Albumin 02/12/2018 4.30  3.50 - 5.00 g/dL Final   • ALT (SGPT) 02/12/2018 37* 10 - 36 U/L Final   • AST (SGOT) 02/12/2018 30  10 - 30 U/L Final   • Alkaline Phosphatase 02/12/2018 107* 35 - 104 U/L Final   • Total Bilirubin 02/12/2018 0.5  0.2 - 1.8 mg/dL Final   • eGFR Non African Amer 02/12/2018 60* >60 mL/min/1.73 Final   • Globulin 02/12/2018 2.9  gm/dL Final   • A/G Ratio 02/12/2018 1.5  1.5 - 2.5 g/dL Final   • BUN/Creatinine Ratio 02/12/2018 9.1  7.0 - 25.0 Final   • Anion Gap 02/12/2018 7.2  3.6 - 11.2 mmol/L Final   • Total Cholesterol 02/12/2018 172  0 - 200 mg/dL Final   • Triglycerides 02/12/2018 177* 0 - 150 mg/dL Final   • HDL Cholesterol 02/12/2018 43* 60 - 100 mg/dL Final   • LDL Cholesterol  02/12/2018 94  0 - 100 mg/dL Final   • VLDL Cholesterol 02/12/2018 35.4  mg/dL Final   • LDL/HDL Ratio 02/12/2018 2.18   Final   • WBC 02/12/2018 6.71  4.50 - 12.50 10*3/mm3 Final   • RBC 02/12/2018 4.62  4.20 - 5.40 10*6/mm3 Final   • Hemoglobin 02/12/2018 13.4  12.0 - 16.0 g/dL Final   • Hematocrit 02/12/2018 41.3  37.0 - 47.0 % Final   • MCV 02/12/2018 89.4  80.0 - 94.0 fL Final   • MCH 02/12/2018 29.0  27.0 - 33.0 pg Final   • MCHC 02/12/2018 32.4* 33.0 - 37.0 g/dL Final   • RDW 02/12/2018 16.1* 11.5 - 14.5 % Final   • RDW-SD 02/12/2018 51.9  37.0 - 54.0 fl Final   • MPV 02/12/2018 11.0* 6.0 - 10.0 fL Final   • Platelets 02/12/2018 284  130 - 400 10*3/mm3 Final   • Neutrophil % 02/12/2018 56.4  30.0 - 70.0 % Final   • Lymphocyte % 02/12/2018 30.4  21.0 - 51.0 % Final   • Monocyte % 02/12/2018 10.3* 0.0 - 10.0 % Final   • Eosinophil % 02/12/2018 2.5  0.0 - 5.0 % Final   • Basophil % 02/12/2018 0.3  0.0 - 2.0 % Final   • Immature Grans % 02/12/2018  0.1  0.0 - 0.5 % Final   • Neutrophils, Absolute 02/12/2018 3.78  1.40 - 6.50 10*3/mm3 Final   • Lymphocytes, Absolute 02/12/2018 2.04  1.00 - 3.00 10*3/mm3 Final   • Monocytes, Absolute 02/12/2018 0.69  0.10 - 0.90 10*3/mm3 Final   • Eosinophils, Absolute 02/12/2018 0.17  0.00 - 0.70 10*3/mm3 Final   • Basophils, Absolute 02/12/2018 0.02  0.00 - 0.30 10*3/mm3 Final   • Immature Grans, Absolute 02/12/2018 0.01  0.00 - 0.03 10*3/mm3 Final   • Osmolality Calc 02/12/2018 283.5  273.0 - 305.0 mOsm/kg Final       Physical Exam   Constitutional: She is oriented to person, place, and time. She appears well-developed and well-nourished. She appears ill. No distress.   Ill appearing 49 year old female with + flu A    HENT:   Head: Normocephalic and atraumatic.   Right Ear: External ear and ear canal normal. Tympanic membrane is injected.   Left Ear: External ear and ear canal normal. Tympanic membrane is injected.   Nose: Mucosal edema and rhinorrhea present. Right sinus exhibits no maxillary sinus tenderness and no frontal sinus tenderness. Left sinus exhibits no maxillary sinus tenderness and no frontal sinus tenderness.   Mouth/Throat: Mucous membranes are normal. Oropharyngeal exudate (thin, clear PND noted) and posterior oropharyngeal erythema (injected) present.   Eyes: Pupils are equal, round, and reactive to light. Conjunctivae and EOM are normal. Right eye exhibits no discharge. Left eye exhibits no discharge.   Neck: Normal range of motion. No thyromegaly present.   Cardiovascular: Normal rate, regular rhythm and normal heart sounds.   No murmur heard.  Pulmonary/Chest: Effort normal. No stridor. She has no wheezes. She has no rhonchi. She has no rales. She exhibits tenderness (chest wall tenderness due to cough).   Productive cough   Abdominal: Soft. Bowel sounds are normal. There is no tenderness.   Lymphadenopathy:     She has cervical adenopathy (firm, mobile nodes).        Right cervical: Superficial cervical  adenopathy present.        Left cervical: Superficial cervical adenopathy present.   Neurological: She is alert and oriented to person, place, and time.   Skin: Skin is warm and dry. Capillary refill takes less than 2 seconds. She is not diaphoretic.   Psychiatric: She has a normal mood and affect. Her behavior is normal. Judgment and thought content normal.   Vitals reviewed.      Assessment/Plan     Problem List Items Addressed This Visit        Digestive    Morbidly obese (CMS/HCC)    Current Assessment & Plan     Obesity is unchanged.  Discussed the patient's BMI.  The BMI is above average; BMI management plan is completed.  General weight loss/lifestyle modification strategies discussed (elicit support from others; identify saboteurs; non-food rewards, etc).            Other    Smoker    Current Assessment & Plan     Recommend patient use this opportunity to stop smoking           Other Visit Diagnoses     Flu-like symptoms    -  Primary    Positive flu A.  Symptomatic treatment advised.                   Patient's Body mass index is 36.09 kg/m². BMI is above normal parameters. Recommendations include: exercise counseling and nutrition counseling.    Ebony Acosta  reports that she has been smoking cigarettes. She has been smoking about 1.00 pack per day. She has never used smokeless tobacco.. I have educated her on the risk of diseases from using tobacco products such as cancer, COPD and heart diease.     I advised her to quit and she is not willing to quit.    I spent 3  minutes counseling the patient.           I have discussed diagnosis in detail today allowing time for questions and answers. Patient is aware of reasons to seek urgent or emergent medical care as well as reasons to return to the clinic for evaluation. Possible side effects, interactions and progression of symptoms discussed as well. Patient / family states understanding.   Emotional support and active listening provided.       Fluids, rest,  symptomatic treatment advised. Steam therapy. Dispose of tooth bush after 24 hours of starting antibiotics if ordered. Complete antibiotics as ordered.  Discussed possible side effects/interactions of medication. Discussed symptoms to report as well as reasons to seek urgent or emergent medical attention. Understanding stated.   Recommend follow up in 2-3 days if not improved, sooner if needed.           This document has been electronically signed by:  SIMON Mackay, NP-C

## 2020-03-04 DIAGNOSIS — J44.9 COPD MIXED TYPE (HCC): ICD-10-CM

## 2020-03-04 DIAGNOSIS — F41.9 CHRONIC ANXIETY: ICD-10-CM

## 2020-03-04 DIAGNOSIS — E78.2 MIXED HYPERLIPIDEMIA: ICD-10-CM

## 2020-03-04 LAB
ALBUMIN SERPL-MCNC: 3.9 G/DL (ref 3.5–5.2)
ALBUMIN/GLOB SERPL: 1.3 G/DL
ALP SERPL-CCNC: 101 U/L (ref 39–117)
ALT SERPL W P-5'-P-CCNC: 15 U/L (ref 1–33)
ANION GAP SERPL CALCULATED.3IONS-SCNC: 10.5 MMOL/L (ref 5–15)
AST SERPL-CCNC: 8 U/L (ref 1–32)
BASOPHILS # BLD AUTO: 0.02 10*3/MM3 (ref 0–0.2)
BASOPHILS NFR BLD AUTO: 0.3 % (ref 0–1.5)
BILIRUB SERPL-MCNC: 0.3 MG/DL (ref 0.2–1.2)
BUN BLD-MCNC: 22 MG/DL (ref 6–20)
BUN/CREAT SERPL: 18.6 (ref 7–25)
CALCIUM SPEC-SCNC: 8.9 MG/DL (ref 8.6–10.5)
CHLORIDE SERPL-SCNC: 104 MMOL/L (ref 98–107)
CHOLEST SERPL-MCNC: 199 MG/DL (ref 0–200)
CO2 SERPL-SCNC: 23.5 MMOL/L (ref 22–29)
CREAT BLD-MCNC: 1.18 MG/DL (ref 0.57–1)
DEPRECATED RDW RBC AUTO: 47.7 FL (ref 37–54)
EOSINOPHIL # BLD AUTO: 0.1 10*3/MM3 (ref 0–0.4)
EOSINOPHIL NFR BLD AUTO: 1.3 % (ref 0.3–6.2)
ERYTHROCYTE [DISTWIDTH] IN BLOOD BY AUTOMATED COUNT: 15 % (ref 12.3–15.4)
GFR SERPL CREATININE-BSD FRML MDRD: 48 ML/MIN/1.73
GLOBULIN UR ELPH-MCNC: 3 GM/DL
GLUCOSE BLD-MCNC: 88 MG/DL (ref 65–99)
HCT VFR BLD AUTO: 42.4 % (ref 34–46.6)
HDLC SERPL-MCNC: 34 MG/DL (ref 40–60)
HGB BLD-MCNC: 14.1 G/DL (ref 12–15.9)
IMM GRANULOCYTES # BLD AUTO: 0.01 10*3/MM3 (ref 0–0.05)
IMM GRANULOCYTES NFR BLD AUTO: 0.1 % (ref 0–0.5)
LDLC SERPL CALC-MCNC: 123 MG/DL (ref 0–100)
LDLC/HDLC SERPL: 3.61 {RATIO}
LYMPHOCYTES # BLD AUTO: 3.02 10*3/MM3 (ref 0.7–3.1)
LYMPHOCYTES NFR BLD AUTO: 38 % (ref 19.6–45.3)
MCH RBC QN AUTO: 28.8 PG (ref 26.6–33)
MCHC RBC AUTO-ENTMCNC: 33.3 G/DL (ref 31.5–35.7)
MCV RBC AUTO: 86.5 FL (ref 79–97)
MONOCYTES # BLD AUTO: 0.52 10*3/MM3 (ref 0.1–0.9)
MONOCYTES NFR BLD AUTO: 6.5 % (ref 5–12)
NEUTROPHILS # BLD AUTO: 4.27 10*3/MM3 (ref 1.7–7)
NEUTROPHILS NFR BLD AUTO: 53.8 % (ref 42.7–76)
NRBC BLD AUTO-RTO: 0 /100 WBC (ref 0–0.2)
PLATELET # BLD AUTO: 339 10*3/MM3 (ref 140–450)
PMV BLD AUTO: 10.8 FL (ref 6–12)
POTASSIUM BLD-SCNC: 4.5 MMOL/L (ref 3.5–5.2)
PROT SERPL-MCNC: 6.9 G/DL (ref 6–8.5)
RBC # BLD AUTO: 4.9 10*6/MM3 (ref 3.77–5.28)
SODIUM BLD-SCNC: 138 MMOL/L (ref 136–145)
TRIGL SERPL-MCNC: 212 MG/DL (ref 0–150)
TSH SERPL DL<=0.05 MIU/L-ACNC: 0.99 UIU/ML (ref 0.27–4.2)
VLDLC SERPL-MCNC: 42.4 MG/DL (ref 5–40)
WBC NRBC COR # BLD: 7.94 10*3/MM3 (ref 3.4–10.8)

## 2020-03-04 PROCEDURE — 80061 LIPID PANEL: CPT | Performed by: GENERAL PRACTICE

## 2020-03-04 PROCEDURE — 80050 GENERAL HEALTH PANEL: CPT | Performed by: GENERAL PRACTICE

## 2020-03-05 ENCOUNTER — OFFICE VISIT (OUTPATIENT)
Dept: FAMILY MEDICINE CLINIC | Facility: CLINIC | Age: 50
End: 2020-03-05

## 2020-03-05 VITALS
BODY MASS INDEX: 35.68 KG/M2 | TEMPERATURE: 98.2 F | HEIGHT: 64 IN | SYSTOLIC BLOOD PRESSURE: 118 MMHG | WEIGHT: 209 LBS | RESPIRATION RATE: 14 BRPM | OXYGEN SATURATION: 98 % | DIASTOLIC BLOOD PRESSURE: 65 MMHG | HEART RATE: 85 BPM

## 2020-03-05 DIAGNOSIS — F17.200 SMOKER: ICD-10-CM

## 2020-03-05 DIAGNOSIS — K21.9 GASTROESOPHAGEAL REFLUX DISEASE WITHOUT ESOPHAGITIS: ICD-10-CM

## 2020-03-05 DIAGNOSIS — M25.562 CHRONIC PAIN OF BOTH KNEES: ICD-10-CM

## 2020-03-05 DIAGNOSIS — J44.9 CHRONIC OBSTRUCTIVE PULMONARY DISEASE, UNSPECIFIED COPD TYPE (HCC): ICD-10-CM

## 2020-03-05 DIAGNOSIS — M50.30 DDD (DEGENERATIVE DISC DISEASE), CERVICAL: ICD-10-CM

## 2020-03-05 DIAGNOSIS — F41.9 CHRONIC ANXIETY: ICD-10-CM

## 2020-03-05 DIAGNOSIS — Z12.31 ENCOUNTER FOR SCREENING MAMMOGRAM FOR BREAST CANCER: ICD-10-CM

## 2020-03-05 DIAGNOSIS — J44.9 COPD MIXED TYPE (HCC): ICD-10-CM

## 2020-03-05 DIAGNOSIS — J30.9 CHRONIC ALLERGIC RHINITIS: ICD-10-CM

## 2020-03-05 DIAGNOSIS — E66.01 MORBIDLY OBESE (HCC): ICD-10-CM

## 2020-03-05 DIAGNOSIS — M25.561 CHRONIC PAIN OF BOTH KNEES: ICD-10-CM

## 2020-03-05 DIAGNOSIS — F41.9 ANXIETY: ICD-10-CM

## 2020-03-05 DIAGNOSIS — Z00.00 HEALTHCARE MAINTENANCE: ICD-10-CM

## 2020-03-05 DIAGNOSIS — J30.89 CHRONIC NON-SEASONAL ALLERGIC RHINITIS: ICD-10-CM

## 2020-03-05 DIAGNOSIS — E78.2 MIXED HYPERLIPIDEMIA: Primary | ICD-10-CM

## 2020-03-05 DIAGNOSIS — G89.29 CHRONIC PAIN OF BOTH KNEES: ICD-10-CM

## 2020-03-05 PROBLEM — S83.411A SPRAIN OF MEDIAL COLLATERAL LIGAMENT OF RIGHT KNEE: Status: RESOLVED | Noted: 2018-08-20 | Resolved: 2020-03-05

## 2020-03-05 PROCEDURE — 99214 OFFICE O/P EST MOD 30 MIN: CPT | Performed by: GENERAL PRACTICE

## 2020-03-05 RX ORDER — ACETAMINOPHEN 500 MG
1000 TABLET ORAL EVERY 6 HOURS PRN
Qty: 180 TABLET | Refills: 5 | Status: SHIPPED | OUTPATIENT
Start: 2020-03-05 | End: 2021-08-14 | Stop reason: SDUPTHER

## 2020-03-05 RX ORDER — HYDROXYZINE PAMOATE 25 MG/1
25 CAPSULE ORAL 3 TIMES DAILY PRN
Qty: 90 CAPSULE | Refills: 5 | Status: SHIPPED | OUTPATIENT
Start: 2020-03-05 | End: 2020-06-02 | Stop reason: SDUPTHER

## 2020-03-05 RX ORDER — CYCLOBENZAPRINE HCL 10 MG
10 TABLET ORAL 3 TIMES DAILY
Qty: 90 TABLET | Refills: 5 | Status: SHIPPED | OUTPATIENT
Start: 2020-03-05 | End: 2020-06-02 | Stop reason: SDUPTHER

## 2020-03-05 RX ORDER — ALBUTEROL SULFATE 90 UG/1
2 AEROSOL, METERED RESPIRATORY (INHALATION) EVERY 4 HOURS PRN
Qty: 18 G | Refills: 5 | Status: SHIPPED | OUTPATIENT
Start: 2020-03-05 | End: 2020-06-02 | Stop reason: SDUPTHER

## 2020-03-05 RX ORDER — LANSOPRAZOLE 30 MG/1
30 CAPSULE, DELAYED RELEASE ORAL 2 TIMES DAILY
Qty: 60 CAPSULE | Refills: 5 | Status: SHIPPED | OUTPATIENT
Start: 2020-03-05 | End: 2020-06-02 | Stop reason: SDUPTHER

## 2020-03-05 RX ORDER — DULOXETIN HYDROCHLORIDE 30 MG/1
30 CAPSULE, DELAYED RELEASE ORAL DAILY
Qty: 30 CAPSULE | Refills: 5 | Status: SHIPPED | OUTPATIENT
Start: 2020-03-05 | End: 2020-06-02 | Stop reason: SDUPTHER

## 2020-03-05 RX ORDER — ATORVASTATIN CALCIUM 40 MG/1
40 TABLET, FILM COATED ORAL
Qty: 30 TABLET | Refills: 5 | Status: SHIPPED | OUTPATIENT
Start: 2020-03-05 | End: 2020-06-02 | Stop reason: SDUPTHER

## 2020-03-05 RX ORDER — BUSPIRONE HYDROCHLORIDE 10 MG/1
10 TABLET ORAL 3 TIMES DAILY
Qty: 90 TABLET | Refills: 5 | Status: SHIPPED | OUTPATIENT
Start: 2020-03-05 | End: 2020-06-02 | Stop reason: SDUPTHER

## 2020-03-05 RX ORDER — MONTELUKAST SODIUM 10 MG/1
10 TABLET ORAL DAILY
Qty: 30 TABLET | Refills: 5 | Status: SHIPPED | OUTPATIENT
Start: 2020-03-05 | End: 2020-06-02 | Stop reason: SDUPTHER

## 2020-03-05 RX ORDER — ASPIRIN 81 MG/1
81 TABLET ORAL DAILY
Qty: 30 TABLET | Refills: 5 | Status: SHIPPED | OUTPATIENT
Start: 2020-03-05 | End: 2020-06-02 | Stop reason: SDUPTHER

## 2020-03-05 RX ORDER — LORATADINE 10 MG/1
10 TABLET ORAL DAILY PRN
Qty: 30 TABLET | Refills: 5 | Status: SHIPPED | OUTPATIENT
Start: 2020-03-05 | End: 2020-06-02 | Stop reason: SDUPTHER

## 2020-03-05 RX ORDER — ALBUTEROL SULFATE 2.5 MG/3ML
2.5 SOLUTION RESPIRATORY (INHALATION) EVERY 4 HOURS PRN
Qty: 180 VIAL | Refills: 5 | Status: SHIPPED | OUTPATIENT
Start: 2020-03-05 | End: 2020-06-02 | Stop reason: SDUPTHER

## 2020-03-05 RX ORDER — GABAPENTIN 600 MG/1
600 TABLET ORAL 3 TIMES DAILY
Qty: 90 TABLET | Refills: 2 | Status: SHIPPED | OUTPATIENT
Start: 2020-03-05 | End: 2020-06-02 | Stop reason: SDUPTHER

## 2020-03-05 NOTE — PROGRESS NOTES
Deonte Acosta is a 49 y.o. female.     History of Present Illness     COPD  She feels that her SOB, cough and wheezing have returned to baseline since last here.  She denies any upper respiratory tract symptoms at present and there is no history of any chest pain, hemoptysis, fever, or chills. She reports that her symptoms become worse with activity and exposure to cold air. Her symptoms are reduced with rest and with inhaled inhaled medication. She has had multiple admissions to hospital for apparent exacerbations associated with pneumonia. She is following the treatment plan, including medications as directed. She has repeatedly missed appointments for a CT of the chest, PFT and a pulmonology assessment.  She is currently smoking 1/2 pack/day.    GERD  Patient has a history of heartburn and bitter taste in mouth. Symptoms have been present for a number of years. The patient denies abdominal bloating, dysphagia, hematemesis, melena and unexpected weight loss. Symptoms appear to be worsened by large meals, lying down and fatty foods. Risk factors present for GERD include tobacco abuse, caffeine use and obesity. Risk factors absent for GERD are alcohol use and NSAID use. Studies performed so far include none. Treatments tried so far include proton pump inhibitor: lansoprazole. Results of treatment: good control of her symptoms but only if she takes it twice daily    Bilateral Knee Pain  She returns with persistent knee pain worse on the right.  The pain is described as a sharp anterior ache worse with weightbearing.  The pain does not radiate elsewhere but has been associated with intermittent swelling and a sense of giving way.  There is no history of any stiffness or locking.     Neck Pain  Symptoms have been present for a number of years and are unchanged. The pain is located in the posterior neck bilaterally and radiates to the left posterior shoulder. The pain is described as sharp and stiffness  and occurs intermittently. She rates her pain as moderate. Symptoms are exacerbated by any movement and prolonged posture. Symptoms are improved by gentle exercise/stretches, heat and cyclobenzaprine and gabapentin. She has tingling about the left shoulder associated with the neck pain. She denies any weakness in the right arm, weakness in the left arm, tingling in the right arm, change in bladder function, change in bowel function, urinary incontinence and bowel incontinence. MRI of the cervical spine performed on 6/29/15 was reported as showing DDD and OA    Anxiety  She has a history of the following anxiety symptoms: nervousness, worrying, insomnia, fatigue, feelings of losing control. Onset of symptoms was a number of years ago.  Symptoms have been intermittent since then. She denies current suicidal and homicidal ideation. Risk factors: negative life event disappearance of her son. Treatment has included medication duloxetine, buspirone and hydroxyzine. She denies any apparent side effects  Lab Results   Component Value Date    TSH 0.990 03/04/2020     Dyslipidemia  Compliance with treatment has been fair. The patient exercises intermittently. She is currently being prescribed the following medication for her dyslipidemia - atorvastatin, omega 3 fatty acids. Patient denies side effects associated with her medications.    Lab Results   Component Value Date    CHOL 199 03/04/2020    TRIG 212 (H) 03/04/2020    HDL 34 (L) 03/04/2020     (H) 03/04/2020     Labs  Lab Results   Component Value Date    GLUCOSE 88 03/04/2020    BUN 22 (H) 03/04/2020    CREATININE 1.18 (H) 03/04/2020    EGFRIFNONA 48 (L) 03/04/2020    BCR 18.6 03/04/2020    K 4.5 03/04/2020    CO2 23.5 03/04/2020    CALCIUM 8.9 03/04/2020    ALBUMIN 3.90 03/04/2020    AST 8 03/04/2020    ALT 15 03/04/2020     The following portions of the patient's history were reviewed and updated as appropriate: allergies, current medications, past medical  history, past social history and problem list.    Review of Systems   Constitutional: Positive for fatigue. Negative for appetite change, chills, fever and unexpected weight change.   HENT: Negative for congestion, ear pain, postnasal drip, rhinorrhea, sneezing, sore throat and voice change.    Eyes: Negative for itching and visual disturbance.   Respiratory: Positive for cough, shortness of breath and wheezing.    Cardiovascular: Negative for chest pain, palpitations and leg swelling.   Gastrointestinal: Negative for abdominal pain, blood in stool, constipation, diarrhea, nausea and vomiting.        Occasional heartburn   Genitourinary: Negative for difficulty urinating, dysuria, frequency, hematuria and urgency.   Musculoskeletal: Positive for arthralgias and neck pain. Negative for back pain, joint swelling and myalgias.   Skin: Negative for rash.   Neurological: Positive for numbness (left lateral neck and posterior shoulder). Negative for weakness and headaches.   Psychiatric/Behavioral: Positive for sleep disturbance. Negative for dysphoric mood and suicidal ideas. The patient is nervous/anxious.      Objective   Physical Exam   Constitutional: She is oriented to person, place, and time. No distress.   Appeared older then stated age. Bright and in good spirits.  Mild antalgic gait.  No apparent distress at rest. No pallor, jaundice, diaphoresis, or cyanosis.     HENT:   Head: Atraumatic.   Right Ear: Tympanic membrane, external ear and ear canal normal.   Left Ear: Tympanic membrane, external ear and ear canal normal.   Nose: Nose normal.   Mouth/Throat: Oropharynx is clear and moist. Mucous membranes are not pale and not cyanotic. Abnormal dentition (adenticulate).   Eyes: Pupils are equal, round, and reactive to light. EOM are normal. No scleral icterus.   Neck: No JVD present. Carotid bruit is not present. No tracheal deviation present. No thyromegaly present.   Cardiovascular: Normal rate, regular rhythm,  S1 normal, S2 normal and intact distal pulses. Exam reveals no gallop, no S3 and no S4.   No murmur heard.  Pulmonary/Chest: No stridor. No respiratory distress. She has decreased breath sounds (diffuse). She has wheezes (diffuse - moderate). She has no rhonchi. She has no rales.   Mild pulmonary hyperinflation   Abdominal: Soft. Bowel sounds are normal.   Musculoskeletal: She exhibits no deformity.     Vascular Status -  Her right foot exhibits no edema. Her left foot exhibits no edema.  Lymphadenopathy:        Head (right side): No submandibular adenopathy present.        Head (left side): No submandibular adenopathy present.     She has no cervical adenopathy.   Neurological: She is alert and oriented to person, place, and time. No cranial nerve deficit. She exhibits normal muscle tone. Coordination normal.   Skin: Skin is warm and dry. No rash noted. She is not diaphoretic. No cyanosis. No pallor. Nails show no clubbing.   Psychiatric: She has a normal mood and affect.     Assessment/Plan   Problems Addressed this Visit        Cardiovascular and Mediastinum    Mixed hyperlipidemia   Encouraged to continue to work on her diet and exercise plan.  Continue current medication    Relevant Medications    aspirin (ASPIRIN ADULT LOW DOSE) 81 MG EC tablet    atorvastatin (LIPITOR) 40 MG tablet       Respiratory    COPD mixed type (CMS/HCC)   COPD is stable.  Reminded of the importance of smoking cessation  Encouraged to remain as active as symptoms allow for  Trial of spiriva respimat in place of anoro as she may not have the inspiratory capacity required for the latter    Relevant Medications    tiotropium bromide monohydrate (SPIRIVA RESPIMAT) 2.5 MCG/ACT aerosol solution inhaler    albuterol (PROVENTIL) (2.5 MG/3ML) 0.083% nebulizer solution    albuterol sulfate HFA (VENTOLIN HFA) 108 (90 Base) MCG/ACT inhaler    loratadine (CLARITIN) 10 MG tablet    mometasone-formoterol (DULERA 200) 200-5 MCG/ACT inhaler     montelukast (SINGULAIR) 10 MG tablet    Chronic non-seasonal allergic rhinitis       Digestive    Gastroesophageal reflux disease without esophagitis   Symptoms are currently stable.  Reminded regarding lifestyle modification.  Continue current medication.    Relevant Medications    lansoprazole (PREVACID) 30 MG capsule    Morbidly obese (CMS/HCC)       Musculoskeletal and Integument    DDD (degenerative disc disease), cervical  Reminded regarding symptomatic treatment.   Continue current medication    Relevant Medications    acetaminophen (ACETAMINOPHEN EXTRA STRENGTH) 500 MG tablet    cyclobenzaprine (FLEXERIL) 10 MG tablet    DULoxetine (CYMBALTA) 30 MG capsule    gabapentin (NEURONTIN) 600 MG tablet    Chronic pain of both knees  As above.  Plain films of the right knee reordered    Relevant Orders    XR Knee 3 View Right       Other    Chronic anxiety  Significant situational component.   Supportive therapy.   Continue current medication.    Relevant Medications    busPIRone (BUSPAR) 10 MG tablet    hydrOXYzine pamoate (VISTARIL) 25 MG capsule    DULoxetine (CYMBALTA) 30 MG capsule    Healthcare maintenance  We will reschedule a mammogram    Relevant Orders    Mammo Screening Digital Tomosynthesis Bilateral With CAD    Smoker

## 2020-04-01 ENCOUNTER — APPOINTMENT (OUTPATIENT)
Dept: MAMMOGRAPHY | Facility: HOSPITAL | Age: 50
End: 2020-04-01

## 2020-06-02 ENCOUNTER — OFFICE VISIT (OUTPATIENT)
Dept: FAMILY MEDICINE CLINIC | Facility: CLINIC | Age: 50
End: 2020-06-02

## 2020-06-02 DIAGNOSIS — J30.89 CHRONIC NON-SEASONAL ALLERGIC RHINITIS: ICD-10-CM

## 2020-06-02 DIAGNOSIS — M50.30 DDD (DEGENERATIVE DISC DISEASE), CERVICAL: ICD-10-CM

## 2020-06-02 DIAGNOSIS — M25.561 CHRONIC PAIN OF BOTH KNEES: ICD-10-CM

## 2020-06-02 DIAGNOSIS — J44.9 COPD MIXED TYPE (HCC): ICD-10-CM

## 2020-06-02 DIAGNOSIS — M25.562 CHRONIC PAIN OF BOTH KNEES: ICD-10-CM

## 2020-06-02 DIAGNOSIS — J30.9 CHRONIC ALLERGIC RHINITIS: ICD-10-CM

## 2020-06-02 DIAGNOSIS — F17.200 SMOKER: ICD-10-CM

## 2020-06-02 DIAGNOSIS — E78.2 MIXED HYPERLIPIDEMIA: Primary | ICD-10-CM

## 2020-06-02 DIAGNOSIS — Z00.00 HEALTHCARE MAINTENANCE: ICD-10-CM

## 2020-06-02 DIAGNOSIS — J44.9 CHRONIC OBSTRUCTIVE PULMONARY DISEASE, UNSPECIFIED COPD TYPE (HCC): ICD-10-CM

## 2020-06-02 DIAGNOSIS — F41.9 CHRONIC ANXIETY: ICD-10-CM

## 2020-06-02 DIAGNOSIS — G89.29 CHRONIC PAIN OF BOTH KNEES: ICD-10-CM

## 2020-06-02 DIAGNOSIS — F41.9 ANXIETY: ICD-10-CM

## 2020-06-02 DIAGNOSIS — K21.9 GASTROESOPHAGEAL REFLUX DISEASE WITHOUT ESOPHAGITIS: ICD-10-CM

## 2020-06-02 PROCEDURE — 99442 PR PHYS/QHP TELEPHONE EVALUATION 11-20 MIN: CPT | Performed by: GENERAL PRACTICE

## 2020-06-02 RX ORDER — ALBUTEROL SULFATE 90 UG/1
2 AEROSOL, METERED RESPIRATORY (INHALATION)
Qty: 2 INHALER | Refills: 5 | Status: SHIPPED | OUTPATIENT
Start: 2020-06-02 | End: 2021-04-05

## 2020-06-02 RX ORDER — DULOXETIN HYDROCHLORIDE 30 MG/1
30 CAPSULE, DELAYED RELEASE ORAL DAILY
Qty: 30 CAPSULE | Refills: 5 | Status: SHIPPED | OUTPATIENT
Start: 2020-06-02 | End: 2021-08-14 | Stop reason: SDUPTHER

## 2020-06-02 RX ORDER — ATORVASTATIN CALCIUM 40 MG/1
40 TABLET, FILM COATED ORAL
Qty: 30 TABLET | Refills: 5 | Status: SHIPPED | OUTPATIENT
Start: 2020-06-02 | End: 2021-04-05

## 2020-06-02 RX ORDER — LORATADINE 10 MG/1
10 TABLET ORAL DAILY PRN
Qty: 30 TABLET | Refills: 5 | Status: SHIPPED | OUTPATIENT
Start: 2020-06-02 | End: 2021-08-14

## 2020-06-02 RX ORDER — HYDROXYZINE PAMOATE 25 MG/1
25 CAPSULE ORAL 3 TIMES DAILY PRN
Qty: 90 CAPSULE | Refills: 5 | Status: SHIPPED | OUTPATIENT
Start: 2020-06-02 | End: 2021-04-05

## 2020-06-02 RX ORDER — GABAPENTIN 600 MG/1
600 TABLET ORAL 3 TIMES DAILY
Qty: 90 TABLET | Refills: 2 | Status: SHIPPED | OUTPATIENT
Start: 2020-06-02 | End: 2020-09-08 | Stop reason: SDUPTHER

## 2020-06-02 RX ORDER — BUSPIRONE HYDROCHLORIDE 10 MG/1
10 TABLET ORAL 3 TIMES DAILY
Qty: 90 TABLET | Refills: 5 | Status: SHIPPED | OUTPATIENT
Start: 2020-06-02 | End: 2021-04-05

## 2020-06-02 RX ORDER — MONTELUKAST SODIUM 10 MG/1
10 TABLET ORAL DAILY
Qty: 30 TABLET | Refills: 5 | Status: SHIPPED | OUTPATIENT
Start: 2020-06-02 | End: 2021-04-05

## 2020-06-02 RX ORDER — CYCLOBENZAPRINE HCL 10 MG
10 TABLET ORAL 3 TIMES DAILY
Qty: 90 TABLET | Refills: 5 | Status: SHIPPED | OUTPATIENT
Start: 2020-06-02 | End: 2021-04-05

## 2020-06-02 RX ORDER — LANSOPRAZOLE 30 MG/1
30 CAPSULE, DELAYED RELEASE ORAL 2 TIMES DAILY
Qty: 60 CAPSULE | Refills: 5 | Status: SHIPPED | OUTPATIENT
Start: 2020-06-02 | End: 2021-02-23

## 2020-06-02 RX ORDER — ALBUTEROL SULFATE 2.5 MG/3ML
2.5 SOLUTION RESPIRATORY (INHALATION) EVERY 4 HOURS PRN
Qty: 180 VIAL | Refills: 5 | Status: SHIPPED | OUTPATIENT
Start: 2020-06-02 | End: 2021-01-07 | Stop reason: SDUPTHER

## 2020-06-02 RX ORDER — ASPIRIN 81 MG/1
81 TABLET ORAL DAILY
Qty: 30 TABLET | Refills: 5 | Status: SHIPPED | OUTPATIENT
Start: 2020-06-02 | End: 2021-07-08

## 2020-06-02 NOTE — PROGRESS NOTES
Deonte Acosta is a 49 y.o. female.     History of Present Illness     This visit has been rescheduled as a phone visit to comply with patient safety concerns in accordance with CDC recommendations. Total time of discussion was 14 minutes.    You have chosen to receive care through a telephone visit. Do you consent to use a telephone visit for your medical care today? Yes    COPD  She feels that her SOB, cough and wheezing have remained at baseline since last here.  She continues to deny any upper respiratory tract symptoms and there is no history of any chest pain, hemoptysis, fever, or chills. She reports that her symptoms become worse with activity and exposure to cold air. Her symptoms are reduced with rest and with inhaled inhaled medication.  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  She has had multiple admissions to hospital for apparent exacerbations associated with pneumonia. She is following the treatment plan, including medications as directed. She has repeatedly missed appointments for a CT of the chest, PFT and a pulmonology assessment.  She is currently smoking 1/2 pack/day.    GERD  Patient has a history of heartburn and bitter taste in mouth. Symptoms have been present for a number of years. The patient denies abdominal bloating, dysphagia, hematemesis, melena and unexpected weight loss. Symptoms appear to be worsened by large meals, lying down and fatty foods. Risk factors present for GERD include tobacco abuse, caffeine use and obesity. Risk factors absent for GERD are alcohol use and NSAID use. Studies performed so far include none. Treatments tried so far include proton pump inhibitor: lansoprazole. Results of treatment: good control of her symptoms but only if she takes it twice daily    Bilateral Knee Pain  She returns with persistent knee pain worse on the right.  The pain is described as a sharp anterior ache worse with  weightbearing.  The pain does not radiate elsewhere but has been associated with intermittent swelling and a sense of giving way.  There is no history of any stiffness or locking.  She has yet to follow-up with the x-rays of her right knee that have been arranged    Neck Pain  Symptoms have been present for a number of years and are unchanged. The pain is located in the posterior neck bilaterally and radiates to the left posterior shoulder. The pain is described as sharp and stiffness and occurs intermittently. She rates her pain as moderate. Symptoms are exacerbated by any movement and prolonged posture. Symptoms are improved by gentle exercise/stretches, heat and cyclobenzaprine and gabapentin. She has tingling about the left shoulder associated with the neck pain. She denies any weakness in the right arm, weakness in the left arm, tingling in the right arm, change in bladder function, change in bowel function, urinary incontinence and bowel incontinence. MRI of the cervical spine performed on 6/29/15 was reported as showing DDD and OA    Anxiety  She has a history of the following anxiety symptoms: nervousness, worrying, insomnia, fatigue, feelings of losing control. Onset of symptoms was a number of years ago.  Symptoms have been intermittent since then. She denies current suicidal and homicidal ideation. Risk factors: negative life event disappearance of her son. Treatment has included medication duloxetine, buspirone and hydroxyzine. She denies any apparent side effects    Dyslipidemia  Compliance with treatment has been fair. The patient exercises intermittently. She is currently being prescribed the following medication for her dyslipidemia - atorvastatin, omega 3 fatty acids. Patient denies side effects associated with her medications.      The following portions of the patient's history were reviewed and updated as appropriate: allergies, current medications, past medical history, past social history and  problem list.    Review of Systems   Constitutional: Positive for fatigue. Negative for appetite change, chills, fever and unexpected weight change.   HENT: Negative for congestion, ear pain, postnasal drip, rhinorrhea, sneezing, sore throat and voice change.    Eyes: Negative for itching and visual disturbance.   Respiratory: Positive for cough, shortness of breath and wheezing.    Cardiovascular: Negative for chest pain, palpitations and leg swelling.   Gastrointestinal: Negative for abdominal pain, blood in stool, constipation, diarrhea, nausea and vomiting.        Occasional heartburn   Genitourinary: Negative for dysuria and hematuria.   Musculoskeletal: Positive for arthralgias and neck pain. Negative for back pain, joint swelling and myalgias.   Skin: Negative for rash.   Neurological: Positive for numbness (left lateral neck and posterior shoulder). Negative for weakness and headaches.   Psychiatric/Behavioral: Positive for sleep disturbance. Negative for dysphoric mood and suicidal ideas. The patient is nervous/anxious.      Objective   Physical Exam   Constitutional:   Alert and oriented.  Bright and in good spirits.  No apparent distress or shortness of breath     Assessment/Plan   Problems Addressed this Visit        Cardiovascular and Mediastinum    Mixed hyperlipidemia  Encouraged to continue to work on her diet and exercise plan.  Continue current medication    Relevant Medications    aspirin (Aspirin Adult Low Dose) 81 MG EC tablet    atorvastatin (LIPITOR) 40 MG tablet       Respiratory    Chronic non-seasonal allergic rhinitis    COPD mixed type (CMS/HCC)   COPD is stable.  Reminded of the importance of smoking cessation  Encouraged to remain as active as symptoms allow for  Continue current medication    Relevant Medications    albuterol sulfate HFA (Ventolin HFA) 108 (90 Base) MCG/ACT inhaler    loratadine (CLARITIN) 10 MG tablet    tiotropium bromide monohydrate (SPIRIVA RESPIMAT) 2.5 MCG/ACT  aerosol solution inhaler    albuterol (PROVENTIL) (2.5 MG/3ML) 0.083% nebulizer solution    mometasone-formoterol (DULERA 200) 200-5 MCG/ACT inhaler    montelukast (SINGULAIR) 10 MG tablet       Digestive    Gastroesophageal reflux disease without esophagitis   Symptoms are currently stable.  Reminded regarding lifestyle modification.  Continue current medication.    Relevant Medications    lansoprazole (PREVACID) 30 MG capsule       Musculoskeletal and Integument    Chronic pain of both knees  Reminded regarding symptomatic treatment.   Continue current medication  Encouraged to follow-up with previously scheduled x-rays of her right knee    DDD (degenerative disc disease), cervical    Relevant Medications    gabapentin (NEURONTIN) 600 MG tablet    cyclobenzaprine (FLEXERIL) 10 MG tablet    DULoxetine (CYMBALTA) 30 MG capsule       Other    Chronic anxiety  Significant situational component.   Supportive therapy.   Continue current medication.    Relevant Medications    hydrOXYzine pamoate (VISTARIL) 25 MG capsule    busPIRone (BUSPAR) 10 MG tablet    DULoxetine (CYMBALTA) 30 MG capsule    Healthcare maintenance  Encouraged to follow-up with her mammogram  Patient remains uninterested in colon or lung cancer screening at present  We will discuss an updated Pap smear at her return    Smoker

## 2020-08-11 ENCOUNTER — OFFICE VISIT (OUTPATIENT)
Dept: FAMILY MEDICINE CLINIC | Facility: CLINIC | Age: 50
End: 2020-08-11

## 2020-08-11 DIAGNOSIS — J44.9 COPD MIXED TYPE (HCC): Primary | ICD-10-CM

## 2020-08-11 DIAGNOSIS — J30.89 CHRONIC NON-SEASONAL ALLERGIC RHINITIS: ICD-10-CM

## 2020-08-11 DIAGNOSIS — R05.9 COUGH: ICD-10-CM

## 2020-08-11 PROCEDURE — 99442 PR PHYS/QHP TELEPHONE EVALUATION 11-20 MIN: CPT | Performed by: NURSE PRACTITIONER

## 2020-08-11 RX ORDER — GUAIFENESIN 600 MG/1
600 TABLET, EXTENDED RELEASE ORAL 2 TIMES DAILY
Qty: 30 TABLET | Refills: 0 | Status: SHIPPED | OUTPATIENT
Start: 2020-08-11 | End: 2020-09-15

## 2020-08-11 RX ORDER — FLUTICASONE PROPIONATE 50 MCG
2 SPRAY, SUSPENSION (ML) NASAL DAILY
COMMUNITY
End: 2021-08-14

## 2020-08-11 RX ORDER — PREDNISONE 20 MG/1
20 TABLET ORAL 3 TIMES DAILY
COMMUNITY
End: 2020-08-28

## 2020-08-11 RX ORDER — DEXTROMETHORPHAN HYDROBROMIDE AND PROMETHAZINE HYDROCHLORIDE 15; 6.25 MG/5ML; MG/5ML
5 SYRUP ORAL NIGHTLY PRN
Qty: 150 ML | Refills: 0 | Status: SHIPPED | OUTPATIENT
Start: 2020-08-11 | End: 2020-08-28 | Stop reason: SDUPTHER

## 2020-08-11 RX ORDER — AZELASTINE 1 MG/ML
2 SPRAY, METERED NASAL 2 TIMES DAILY
COMMUNITY
End: 2020-08-28

## 2020-08-11 NOTE — PROGRESS NOTES
You have chosen to receive care through a telephone visit. Do you consent to use a telephone visit for your medical care today? Yes    History of Present Illness   Ebony Acosta is a 50 y.o. female who was seen at the [facility] on 08/07/2020 for Upper respiratory symptoms, cough, smothering and chest tightness. She was evaluated, treated and released. Her ED visit has been requested and reviewed. She reports she continues to have these symptoms. She was tested for COVID 19 due to her symptoms and her  had been diagnosed with COVID 19 on   July 21 st. Ebony does have COPD, mixed and she does continue to smoke although she is smoking much less in the past week. In addition, she is employed as a  at one of the [employer] in [place]. She does have a nebulizer but it is not working at this time.    The following portions of the patient's history were reviewed and updated as appropriate: allergies, current medications, past family history, past medical history, past social history, past surgical history and problem list.    Current Outpatient Medications:   •  acetaminophen (ACETAMINOPHEN EXTRA STRENGTH) 500 MG tablet, Take 2 tablets by mouth Every 6 (Six) Hours As Needed for Mild Pain ., Disp: 180 tablet, Rfl: 5  •  albuterol sulfate HFA (Ventolin HFA) 108 (90 Base) MCG/ACT inhaler, Inhale 2 puffs Every 3 (Three) Hours As Needed for Wheezing., Disp: 2 inhaler, Rfl: 5  •  aspirin (Aspirin Adult Low Dose) 81 MG EC tablet, Take 1 tablet by mouth Daily., Disp: 30 tablet, Rfl: 5  •  atorvastatin (LIPITOR) 40 MG tablet, Take 1 tablet by mouth every night at bedtime., Disp: 30 tablet, Rfl: 5  •  azelastine (ASTELIN) 0.1 % nasal spray, 2 sprays into the nostril(s) as directed by provider 2 (Two) Times a Day. Use in each nostril as directed, Disp: , Rfl:   •  busPIRone (BUSPAR) 10 MG tablet, Take 1 tablet by mouth 3 (Three) Times a Day., Disp: 90 tablet, Rfl: 5  •  cyclobenzaprine (FLEXERIL) 10 MG tablet,  Take 1 tablet by mouth 3 (Three) Times a Day., Disp: 90 tablet, Rfl: 5  •  DULoxetine (CYMBALTA) 30 MG capsule, Take 1 capsule by mouth Daily., Disp: 30 capsule, Rfl: 5  •  fluticasone (FLONASE) 50 MCG/ACT nasal spray, 2 sprays into the nostril(s) as directed by provider Daily., Disp: , Rfl:   •  gabapentin (NEURONTIN) 600 MG tablet, Take 1 tablet by mouth 3 (Three) Times a Day., Disp: 90 tablet, Rfl: 2  •  hydrOXYzine pamoate (VISTARIL) 25 MG capsule, Take 1 capsule by mouth 3 (Three) Times a Day As Needed for Anxiety., Disp: 90 capsule, Rfl: 5  •  lansoprazole (PREVACID) 30 MG capsule, Take 1 capsule by mouth 2 (Two) Times a Day., Disp: 60 capsule, Rfl: 5  •  loratadine (CLARITIN) 10 MG tablet, Take 1 tablet by mouth Daily As Needed for Allergies., Disp: 30 tablet, Rfl: 5  •  mometasone-formoterol (DULERA 200) 200-5 MCG/ACT inhaler, Inhale 2 puffs 2 (Two) Times a Day., Disp: 13 g, Rfl: 5  •  montelukast (SINGULAIR) 10 MG tablet, Take 1 tablet by mouth Daily., Disp: 30 tablet, Rfl: 5  •  predniSONE (DELTASONE) 20 MG tablet, Take 20 mg by mouth 3 (Three) Times a Day., Disp: , Rfl:   •  tiotropium bromide monohydrate (SPIRIVA RESPIMAT) 2.5 MCG/ACT aerosol solution inhaler, Inhale 2 puffs Every Morning., Disp: 1 inhaler, Rfl: 5  •  albuterol (PROVENTIL) (2.5 MG/3ML) 0.083% nebulizer solution, Take 2.5 mg by nebulization Every 4 (Four) Hours As Needed for Wheezing., Disp: 180 vial, Rfl: 5  •  guaiFENesin (Mucinex) 600 MG 12 hr tablet, Take 1 tablet by mouth 2 (Two) Times a Day. Drink lots of water, Disp: 30 tablet, Rfl: 0  •  promethazine-dextromethorphan (PROMETHAZINE-DM) 6.25-15 MG/5ML syrup, Take 5 mL by mouth At Night As Needed for Cough., Disp: 150 mL, Rfl: 0    Allergies   Allergen Reactions   • Penicillins    • Percocet [Oxycodone-Acetaminophen]      Review of Systems   Constitutional: Positive for activity change, appetite change, chills, fatigue and fever (99.5 oral yesterday).   HENT: Positive for congestion,  postnasal drip, rhinorrhea and sinus pressure. Negative for ear pain, tinnitus and trouble swallowing.    Eyes: Negative for discharge and redness.   Respiratory: Positive for cough, shortness of breath and wheezing.    Gastrointestinal: Positive for nausea. Negative for vomiting.   Skin: Negative for color change and rash.   Neurological: Positive for headaches.   Psychiatric/Behavioral: Positive for sleep disturbance.     There were no vitals taken for this visit.    Physical Exam   Constitutional: She is oriented to person, place, and time.   Neurological: She is alert and oriented to person, place, and time.   Psychiatric: She has a normal mood and affect. Her speech is normal.     Assessment/Plan   Diagnoses and all orders for this visit:    COPD mixed type (CMS/HCC)  -     Home Nebulizer  -     Home Nebulizer Accessories  -     guaiFENesin (Mucinex) 600 MG 12 hr tablet; Take 1 tablet by mouth 2 (Two) Times a Day. Drink lots of water    Chronic non-seasonal allergic rhinitis      - Astelin Nasal Spray   Cough  -     promethazine-dextromethorphan (PROMETHAZINE-DM) 6.25-15 MG/5ML syrup; Take 5 mL by mouth At Night As Needed for Cough.    Discussed her symptoms and treatment options. Will fax prescriptions for Home Nebulizer and her supplies to [facility]. Encouraged her to be aggressive about using her nebulizer. Counseled regarding supportive care measures and smoking cessation. Discussed repeat COVID testing which I did recommend. She would like to go to a local clinic for this instead of traveling to [place]. Requested she keep us in touch with the results. Work excuse provided until Saturday, August 15 th.Encouraged her to seek further medical evaluation  if symptoms worsen or do not improve within 48-72 hours.  This visit has been scheduled as a phone visit to comply with patient safety concerns in accordance with CDC recommendations. Total time of discussion was 20 minutes.        This document has been  electronically signed by SIMON Arcos, ISHAANP-BC, CDE  August 11, 2020 13:16

## 2020-08-11 NOTE — PATIENT INSTRUCTIONS
Steps to Quit Smoking  Smoking tobacco is the leading cause of preventable death. It can affect almost every organ in the body. Smoking puts you and people around you at risk for many serious, long-lasting (chronic) diseases. Quitting smoking can be hard, but it is one of the best things that you can do for your health. It is never too late to quit.  How do I get ready to quit?  When you decide to quit smoking, make a plan to help you succeed. Before you quit:  · Pick a date to quit. Set a date within the next 2 weeks to give you time to prepare.  · Write down the reasons why you are quitting. Keep this list in places where you will see it often.  · Tell your family, friends, and co-workers that you are quitting. Their support is important.  · Talk with your doctor about the choices that may help you quit.  · Find out if your health insurance will pay for these treatments.  · Know the people, places, things, and activities that make you want to smoke (triggers). Avoid them.  What first steps can I take to quit smoking?  · Throw away all cigarettes at home, at work, and in your car.  · Throw away the things that you use when you smoke, such as ashtrays and lighters.  · Clean your car. Make sure to empty the ashtray.  · Clean your home, including curtains and carpets.  What can I do to help me quit smoking?  Talk with your doctor about taking medicines and seeing a counselor at the same time. You are more likely to succeed when you do both.  · If you are pregnant or breastfeeding, talk with your doctor about counseling or other ways to quit smoking. Do not take medicine to help you quit smoking unless your doctor tells you to do so.  To quit smoking:  Quit right away  · Quit smoking totally, instead of slowly cutting back on how much you smoke over a period of time.  · Go to counseling. You are more likely to quit if you go to counseling sessions regularly.  Take medicine  You may take medicines to help you quit. Some  medicines need a prescription, and some you can buy over-the-counter. Some medicines may contain a drug called nicotine to replace the nicotine in cigarettes. Medicines may:  · Help you to stop having the desire to smoke (cravings).  · Help to stop the problems that come when you stop smoking (withdrawal symptoms).  Your doctor may ask you to use:  · Nicotine patches, gum, or lozenges.  · Nicotine inhalers or sprays.  · Non-nicotine medicine that is taken by mouth.  Find resources  Find resources and other ways to help you quit smoking and remain smoke-free after you quit. These resources are most helpful when you use them often. They include:  · Online chats with a counselor.  · Phone quitlines.  · Printed self-help materials.  · Support groups or group counseling.  · Text messaging programs.  · Mobile phone apps. Use apps on your mobile phone or tablet that can help you stick to your quit plan. There are many free apps for mobile phones and tablets as well as websites. Examples include Quit Guide from the CDC and smokefree.gov    What things can I do to make it easier to quit?    · Talk to your family and friends. Ask them to support and encourage you.  · Call a phone quitline (4-128-QUITNOW), reach out to support groups, or work with a counselor.  · Ask people who smoke to not smoke around you.  · Avoid places that make you want to smoke, such as:  ? Bars.  ? Parties.  ? Smoke-break areas at work.  · Spend time with people who do not smoke.  · Lower the stress in your life. Stress can make you want to smoke. Try these things to help your stress:  ? Getting regular exercise.  ? Doing deep-breathing exercises.  ? Doing yoga.  ? Meditating.  ? Doing a body scan. To do this, close your eyes, focus on one area of your body at a time from head to toe. Notice which parts of your body are tense. Try to relax the muscles in those areas.  How will I feel when I quit smoking?  Day 1 to 3 weeks  Within the first 24 hours,  you may start to have some problems that come from quitting tobacco. These problems are very bad 2-3 days after you quit, but they do not often last for more than 2-3 weeks. You may get these symptoms:  · Mood swings.  · Feeling restless, nervous, angry, or annoyed.  · Trouble concentrating.  · Dizziness.  · Strong desire for high-sugar foods and nicotine.  · Weight gain.  · Trouble pooping (constipation).  · Feeling like you may vomit (nausea).  · Coughing or a sore throat.  · Changes in how the medicines that you take for other issues work in your body.  · Depression.  · Trouble sleeping (insomnia).  Week 3 and afterward  After the first 2-3 weeks of quitting, you may start to notice more positive results, such as:  · Better sense of smell and taste.  · Less coughing and sore throat.  · Slower heart rate.  · Lower blood pressure.  · Clearer skin.  · Better breathing.  · Fewer sick days.  Quitting smoking can be hard. Do not give up if you fail the first time. Some people need to try a few times before they succeed. Do your best to stick to your quit plan, and talk with your doctor if you have any questions or concerns.  Summary  · Smoking tobacco is the leading cause of preventable death. Quitting smoking can be hard, but it is one of the best things that you can do for your health.  · When you decide to quit smoking, make a plan to help you succeed.  · Quit smoking right away, not slowly over a period of time.  · When you start quitting, seek help from your doctor, family, or friends.  This information is not intended to replace advice given to you by your health care provider. Make sure you discuss any questions you have with your health care provider.  Document Released: 10/14/2010 Document Revised: 03/06/2020 Document Reviewed: 03/07/2020  Elsevier Patient Education © 2020 [employer]  Chronic Obstructive Pulmonary Disease  Chronic obstructive pulmonary disease (COPD) is a long-term (chronic) lung problem.  When you have COPD, it is hard for air to get in and out of your lungs. Usually the condition gets worse over time, and your lungs will never return to normal. There are things you can do to keep yourself as healthy as possible.  · Your doctor may treat your condition with:  ? Medicines.  ? Oxygen.  ? Lung surgery.  · Your doctor may also recommend:  ? Rehabilitation. This includes steps to make your body work better. It may involve a team of specialists.  ? Quitting smoking, if you smoke.  ? Exercise and changes to your diet.  ? Comfort measures (palliative care).  Follow these instructions at home:  Medicines  · Take over-the-counter and prescription medicines only as told by your doctor.  · Talk to your doctor before taking any cough or allergy medicines. You may need to avoid medicines that cause your lungs to be dry.  Lifestyle  · If you smoke, stop. Smoking makes the problem worse. If you need help quitting, ask your doctor.  · Avoid being around things that make your breathing worse. This may include smoke, chemicals, and fumes.  · Stay active, but remember to rest as well.  · Learn and use tips on how to relax.  · Make sure you get enough sleep. Most adults need at least 7 hours of sleep every night.  · Eat healthy foods. Eat smaller meals more often. Rest before meals.  Controlled breathing  Learn and use tips on how to control your breathing as told by your doctor. Try:  · Breathing in (inhaling) through your nose for 1 second. Then, pucker your lips and breath out (exhale) through your lips for 2 seconds.  · Putting one hand on your belly (abdomen). Breathe in slowly through your nose for 1 second. Your hand on your belly should move out. Pucker your lips and breathe out slowly through your lips. Your hand on your belly should move in as you breathe out.    Controlled coughing  Learn and use controlled coughing to clear mucus from your lungs. Follow these steps:  1. Lean your head a little  forward.  2. Breathe in deeply.  3. Try to hold your breath for 3 seconds.  4. Keep your mouth slightly open while coughing 2 times.  5. Spit any mucus out into a tissue.  6. Rest and do the steps again 1 or 2 times as needed.  General instructions  · Make sure you get all the shots (vaccines) that your doctor recommends. Ask your doctor about a flu shot and a pneumonia shot.  · Use oxygen therapy and pulmonary rehabilitation if told by your doctor. If you need home oxygen therapy, ask your doctor if you should buy a tool to measure your oxygen level (oximeter).  · Make a COPD action plan with your doctor. This helps you to know what to do if you feel worse than usual.  · Manage any other conditions you have as told by your doctor.  · Avoid going outside when it is very hot, cold, or humid.  · Avoid people who have a sickness you can catch (contagious).  · Keep all follow-up visits as told by your doctor. This is important.  Contact a doctor if:  · You cough up more mucus than usual.  · There is a change in the color or thickness of the mucus.  · It is harder to breathe than usual.  · Your breathing is faster than usual.  · You have trouble sleeping.  · You need to use your medicines more often than usual.  · You have trouble doing your normal activities such as getting dressed or walking around the house.  Get help right away if:  · You have shortness of breath while resting.  · You have shortness of breath that stops you from:  ? Being able to talk.  ? Doing normal activities.  · Your chest hurts for longer than 5 minutes.  · Your skin color is more blue than usual.  · Your pulse oximeter shows that you have low oxygen for longer than 5 minutes.  · You have a fever.  · You feel too tired to breathe normally.  Summary  · Chronic obstructive pulmonary disease (COPD) is a long-term lung problem.  · The way your lungs work will never return to normal. Usually the condition gets worse over time. There are things you  can do to keep yourself as healthy as possible.  · Take over-the-counter and prescription medicines only as told by your doctor.  · If you smoke, stop. Smoking makes the problem worse.  This information is not intended to replace advice given to you by your health care provider. Make sure you discuss any questions you have with your health care provider.  Document Released: 06/05/2009 Document Revised: 11/30/2018 Document Reviewed: 01/22/2018  Elsevier Patient Education © 2020 [employer] Inc[employer]

## 2020-08-14 ENCOUNTER — OFFICE VISIT (OUTPATIENT)
Dept: FAMILY MEDICINE CLINIC | Facility: CLINIC | Age: 50
End: 2020-08-14

## 2020-08-14 DIAGNOSIS — J44.1 COPD WITH ACUTE EXACERBATION (HCC): Primary | Chronic | ICD-10-CM

## 2020-08-14 DIAGNOSIS — F17.200 SMOKER: ICD-10-CM

## 2020-08-14 DIAGNOSIS — J30.89 CHRONIC NON-SEASONAL ALLERGIC RHINITIS: Chronic | ICD-10-CM

## 2020-08-14 PROCEDURE — 99442 PR PHYS/QHP TELEPHONE EVALUATION 11-20 MIN: CPT | Performed by: NURSE PRACTITIONER

## 2020-08-14 RX ORDER — DOXYCYCLINE HYCLATE 100 MG/1
100 CAPSULE ORAL 2 TIMES DAILY
COMMUNITY
End: 2020-08-28

## 2020-08-14 NOTE — PROGRESS NOTES
You have chosen to receive care through a telephone visit. Do you consent to use a telephone visit for your medical care today? Yes    Ebony Acosta is a 50 y.o. female who is c/o continued COPD with upper respiratory symptoms. She did have a repeat COVID 19 test which was negative. At her last visit, she was prescribed a nebulizer due to the one she had was not working. She has been using it consistently three to four times a day. She has also added the Mucinex and is finding she is able to bring the phlegm up much easier.      COPD   She complains of chest tightness (Improving), cough, hoarse voice, shortness of breath (Improving), sputum production (Much improved) and wheezing (Improving). There is no difficulty breathing or hemoptysis. The current episode started 1 to 4 weeks ago. The problem has been gradually improving. The cough is productive of sputum. Associated symptoms include appetite change, headaches, malaise/fatigue and rhinorrhea. Pertinent negatives include no chest pain, ear pain, fever, heartburn, nasal congestion, PND or trouble swallowing. Her symptoms are aggravated by climbing stairs. Her symptoms are alleviated by prescription cough suppressant, rest, steroid inhaler, oral steroids, ipratropium, leukotriene antagonist and beta-agonist. She reports significant improvement on treatment. Her symptoms are not alleviated by OTC cough suppressant. Risk factors for lung disease include smoking/tobacco exposure and occupational exposure. Her past medical history is significant for COPD.      The following portions of the patient's history were reviewed and updated as appropriate: allergies, current medications, past family history, past medical history, past social history, past surgical history and problem list.    Current Outpatient Medications:   •  acetaminophen (ACETAMINOPHEN EXTRA STRENGTH) 500 MG tablet, Take 2 tablets by mouth Every 6 (Six) Hours As Needed for Mild Pain ., Disp: 180 tablet,  Rfl: 5  •  albuterol (PROVENTIL) (2.5 MG/3ML) 0.083% nebulizer solution, Take 2.5 mg by nebulization Every 4 (Four) Hours As Needed for Wheezing., Disp: 180 vial, Rfl: 5  •  albuterol sulfate HFA (Ventolin HFA) 108 (90 Base) MCG/ACT inhaler, Inhale 2 puffs Every 3 (Three) Hours As Needed for Wheezing., Disp: 2 inhaler, Rfl: 5  •  aspirin (Aspirin Adult Low Dose) 81 MG EC tablet, Take 1 tablet by mouth Daily., Disp: 30 tablet, Rfl: 5  •  atorvastatin (LIPITOR) 40 MG tablet, Take 1 tablet by mouth every night at bedtime., Disp: 30 tablet, Rfl: 5  •  azelastine (ASTELIN) 0.1 % nasal spray, 2 sprays into the nostril(s) as directed by provider 2 (Two) Times a Day. Use in each nostril as directed, Disp: , Rfl:   •  busPIRone (BUSPAR) 10 MG tablet, Take 1 tablet by mouth 3 (Three) Times a Day., Disp: 90 tablet, Rfl: 5  •  cyclobenzaprine (FLEXERIL) 10 MG tablet, Take 1 tablet by mouth 3 (Three) Times a Day., Disp: 90 tablet, Rfl: 5  •  doxycycline (VIBRAMYCIN) 100 MG capsule, Take 100 mg by mouth 2 (Two) Times a Day., Disp: , Rfl:   •  DULoxetine (CYMBALTA) 30 MG capsule, Take 1 capsule by mouth Daily., Disp: 30 capsule, Rfl: 5  •  gabapentin (NEURONTIN) 600 MG tablet, Take 1 tablet by mouth 3 (Three) Times a Day., Disp: 90 tablet, Rfl: 2  •  guaiFENesin (Mucinex) 600 MG 12 hr tablet, Take 1 tablet by mouth 2 (Two) Times a Day. Drink lots of water, Disp: 30 tablet, Rfl: 0  •  hydrOXYzine pamoate (VISTARIL) 25 MG capsule, Take 1 capsule by mouth 3 (Three) Times a Day As Needed for Anxiety., Disp: 90 capsule, Rfl: 5  •  lansoprazole (PREVACID) 30 MG capsule, Take 1 capsule by mouth 2 (Two) Times a Day., Disp: 60 capsule, Rfl: 5  •  loratadine (CLARITIN) 10 MG tablet, Take 1 tablet by mouth Daily As Needed for Allergies., Disp: 30 tablet, Rfl: 5  •  montelukast (SINGULAIR) 10 MG tablet, Take 1 tablet by mouth Daily., Disp: 30 tablet, Rfl: 5  •  predniSONE (DELTASONE) 20 MG tablet, Take 20 mg by mouth 3 (Three) Times a Day.,  Disp: , Rfl:   •  promethazine-dextromethorphan (PROMETHAZINE-DM) 6.25-15 MG/5ML syrup, Take 5 mL by mouth At Night As Needed for Cough., Disp: 150 mL, Rfl: 0  •  tiotropium bromide monohydrate (SPIRIVA RESPIMAT) 2.5 MCG/ACT aerosol solution inhaler, Inhale 2 puffs Every Morning., Disp: 1 inhaler, Rfl: 5  •  fluticasone (FLONASE) 50 MCG/ACT nasal spray, 2 sprays into the nostril(s) as directed by provider Daily., Disp: , Rfl:   •  mometasone-formoterol (DULERA 200) 200-5 MCG/ACT inhaler, Inhale 2 puffs 2 (Two) Times a Day., Disp: 13 g, Rfl: 5    Allergies   Allergen Reactions   • Penicillins    • Percocet [Oxycodone-Acetaminophen]      Review of Systems   Constitutional: Positive for appetite change and malaise/fatigue. Negative for fever.   HENT: Positive for hoarse voice and rhinorrhea. Negative for ear pain and trouble swallowing.    Respiratory: Positive for cough, sputum production (Much improved), shortness of breath (Improving) and wheezing (Improving). Negative for hemoptysis.    Cardiovascular: Negative for chest pain and PND.   Gastrointestinal: Negative for heartburn.   Neurological: Positive for headaches.     There were no vitals taken for this visit.    Physical Exam   Constitutional: She is oriented to person, place, and time.   Pleasant; voice stronger today   Neurological: She is alert and oriented to person, place, and time.   Psychiatric: She has a normal mood and affect. Her speech is normal and behavior is normal. Thought content normal.     Assessment/Plan   Diagnoses and all orders for this visit:    COPD with acute exacerbation (CMS/[facility])  Comments:  She is gradually improving with her current regimen of Doxycyline, Neb treatments,,Inhalers,  Prednisone and supportive care measures.     Chronic non-seasonal allergic rhinitis  Comments:  Continue to encourage avoidance of irritants including smoking cessation.    Smoker  Comments:  Continue to encourage smoking cessation.    Discussed her  symptoms and treatment options. She is gradually improving with her current regimen of Doxycyline, Neb treatments,,Inhalers,  Prednisone and supportive care measures. Encouraged her to continue. Extended her work excuse until Tuesday, August 18 th. Continue to encourage avoidance of irritants including smoking cessation.  S/S of concern reviewed and if occur to seek further medical evaluation or if symptom worsen or do not continue to improve.   This visit has been scheduled as a phone visit to comply with patient safety concerns in accordance with CDC recommendations. Total time of discussion was 20  minutes.      This document has been electronically signed by SIMON Arcos, JOCE-BC, CDE  August 14, 2020 17:43

## 2020-08-18 ENCOUNTER — OFFICE VISIT (OUTPATIENT)
Dept: FAMILY MEDICINE CLINIC | Facility: CLINIC | Age: 50
End: 2020-08-18

## 2020-08-18 DIAGNOSIS — J44.9 COPD MIXED TYPE (HCC): Primary | ICD-10-CM

## 2020-08-18 DIAGNOSIS — R05.9 COUGH: ICD-10-CM

## 2020-08-18 PROCEDURE — 99442 PR PHYS/QHP TELEPHONE EVALUATION 11-20 MIN: CPT | Performed by: NURSE PRACTITIONER

## 2020-08-18 NOTE — PROGRESS NOTES
You have chosen to receive care through a telephone visit. Do you consent to use a telephone visit for your medical care today? Yes    History of Present Illness   Ebony Acosta is a 50 y.o. female who is c/o continued upper respiratory symptoms which started over two weeks ago.  She was seen at the [facility] on 08/07/2020 for upper respiratory symptoms, cough, smothering and chest tightness. She was evaluated including a chest x ray, treated and released. Also, she was tested for COVID 19 due to her symptoms and her  had been diagnosed with COVID 19 on July 21 st. This test was negative. She has again been tested at an outpatient clinic and was negative again. Ebony does have COPD, mixed and she does continue to smoke although she is smoking much less in the past month. She is employed as a  at one of the [employer] in [place]. She reports after antibiotics, neb treatments, Prednisone she is at 50-60% baseline   Refer to ROS for additional information.    The following portions of the patient's history were reviewed and updated as appropriate: allergies, current medications, past family history, past medical history, past social history, past surgical history and problem list.    Current Outpatient Medications:   •  acetaminophen (ACETAMINOPHEN EXTRA STRENGTH) 500 MG tablet, Take 2 tablets by mouth Every 6 (Six) Hours As Needed for Mild Pain ., Disp: 180 tablet, Rfl: 5  •  albuterol (PROVENTIL) (2.5 MG/3ML) 0.083% nebulizer solution, Take 2.5 mg by nebulization Every 4 (Four) Hours As Needed for Wheezing., Disp: 180 vial, Rfl: 5  •  albuterol sulfate HFA (Ventolin HFA) 108 (90 Base) MCG/ACT inhaler, Inhale 2 puffs Every 3 (Three) Hours As Needed for Wheezing., Disp: 2 inhaler, Rfl: 5  •  aspirin (Aspirin Adult Low Dose) 81 MG EC tablet, Take 1 tablet by mouth Daily., Disp: 30 tablet, Rfl: 5  •  atorvastatin (LIPITOR) 40 MG tablet, Take 1 tablet by mouth every night at bedtime., Disp: 30  tablet, Rfl: 5  •  azelastine (ASTELIN) 0.1 % nasal spray, 2 sprays into the nostril(s) as directed by provider 2 (Two) Times a Day. Use in each nostril as directed, Disp: , Rfl:   •  busPIRone (BUSPAR) 10 MG tablet, Take 1 tablet by mouth 3 (Three) Times a Day., Disp: 90 tablet, Rfl: 5  •  cyclobenzaprine (FLEXERIL) 10 MG tablet, Take 1 tablet by mouth 3 (Three) Times a Day., Disp: 90 tablet, Rfl: 5  •  DULoxetine (CYMBALTA) 30 MG capsule, Take 1 capsule by mouth Daily., Disp: 30 capsule, Rfl: 5  •  fluticasone (FLONASE) 50 MCG/ACT nasal spray, 2 sprays into the nostril(s) as directed by provider Daily., Disp: , Rfl:   •  gabapentin (NEURONTIN) 600 MG tablet, Take 1 tablet by mouth 3 (Three) Times a Day., Disp: 90 tablet, Rfl: 2  •  guaiFENesin (Mucinex) 600 MG 12 hr tablet, Take 1 tablet by mouth 2 (Two) Times a Day. Drink lots of water, Disp: 30 tablet, Rfl: 0  •  hydrOXYzine pamoate (VISTARIL) 25 MG capsule, Take 1 capsule by mouth 3 (Three) Times a Day As Needed for Anxiety., Disp: 90 capsule, Rfl: 5  •  lansoprazole (PREVACID) 30 MG capsule, Take 1 capsule by mouth 2 (Two) Times a Day., Disp: 60 capsule, Rfl: 5  •  loratadine (CLARITIN) 10 MG tablet, Take 1 tablet by mouth Daily As Needed for Allergies., Disp: 30 tablet, Rfl: 5  •  mometasone-formoterol (DULERA 200) 200-5 MCG/ACT inhaler, Inhale 2 puffs 2 (Two) Times a Day., Disp: 13 g, Rfl: 5  •  montelukast (SINGULAIR) 10 MG tablet, Take 1 tablet by mouth Daily., Disp: 30 tablet, Rfl: 5  •  promethazine-dextromethorphan (PROMETHAZINE-DM) 6.25-15 MG/5ML syrup, Take 5 mL by mouth At Night As Needed for Cough., Disp: 150 mL, Rfl: 0  •  tiotropium bromide monohydrate (SPIRIVA RESPIMAT) 2.5 MCG/ACT aerosol solution inhaler, Inhale 2 puffs Every Morning., Disp: 1 inhaler, Rfl: 5  •  doxycycline (VIBRAMYCIN) 100 MG capsule, Take 100 mg by mouth 2 (Two) Times a Day., Disp: , Rfl:   •  predniSONE (DELTASONE) 20 MG tablet, Take 20 mg by mouth 3 (Three) Times a Day.,  Disp: , Rfl:     Allergies   Allergen Reactions   • Penicillins    • Percocet [Oxycodone-Acetaminophen]      Review of Systems   Constitutional: Positive for activity change (Has not worked for several weeks) and fatigue. Negative for appetite change, chills, diaphoresis and fever.   HENT: Positive for congestion, postnasal drip and rhinorrhea. Negative for drooling, ear pain, sinus pressure, sinus pain, sore throat and trouble swallowing.    Eyes: Negative for visual disturbance.   Respiratory: Positive for cough, chest tightness, shortness of breath and wheezing.    Cardiovascular: Negative for chest pain, palpitations and leg swelling.   Gastrointestinal: Negative for nausea and vomiting.   Skin: Negative for color change and rash.   Allergic/Immunologic: Positive for environmental allergies.   Neurological: Positive for dizziness and light-headedness.   Hematological: Negative for adenopathy.   Psychiatric/Behavioral: Negative for sleep disturbance.   All other systems reviewed and are negative.    There were no vitals taken for this visit.    Physical Exam   Constitutional:   Pleasant; continues to cough and have shortness of air when speaking   Psychiatric: She has a normal mood and affect. Her behavior is normal. Thought content normal. Cognition and memory are normal.     Assessment/Plan   Diagnoses and all orders for this visit:    COPD mixed type (CMS/HCC)    Cough  -     XR Chest 2 View; Future    Discussed her symptoms and treatment options. Will get a chest x ray in the morning. She is to continue her nebulizer treatments, increase in fluids and smoking cessation.She will continue to remain off work at this time.  S/S of concern reviewed and if occur to seek further medical evaluation.   This visit has been scheduled as a phone visit to comply with patient safety concerns in accordance with CDC recommendations. Total time of discussion was 15 minutes.           This document has been electronically  signed by SIMON Arcos, FNP-BC, CDE  August 18, 2020 17:53

## 2020-08-19 ENCOUNTER — HOSPITAL ENCOUNTER (OUTPATIENT)
Dept: GENERAL RADIOLOGY | Facility: HOSPITAL | Age: 50
Discharge: HOME | End: 2020-08-19
Admitting: NURSE PRACTITIONER

## 2020-08-19 DIAGNOSIS — R05.9 COUGH: ICD-10-CM

## 2020-08-19 PROCEDURE — 71046 X-RAY EXAM CHEST 2 VIEWS: CPT

## 2020-08-19 PROCEDURE — 71046 X-RAY EXAM CHEST 2 VIEWS: CPT | Performed by: RADIOLOGY

## 2020-08-25 ENCOUNTER — OFFICE VISIT (OUTPATIENT)
Dept: FAMILY MEDICINE CLINIC | Facility: CLINIC | Age: 50
End: 2020-08-25

## 2020-08-25 VITALS — HEIGHT: 64 IN | WEIGHT: 94.8 LBS | BODY MASS INDEX: 16.18 KG/M2 | TEMPERATURE: 98.9 F

## 2020-08-25 DIAGNOSIS — J44.9 COPD MIXED TYPE (HCC): Primary | ICD-10-CM

## 2020-08-25 PROCEDURE — 99442 PR PHYS/QHP TELEPHONE EVALUATION 11-20 MIN: CPT | Performed by: NURSE PRACTITIONER

## 2020-08-25 NOTE — PROGRESS NOTES
You have chosen to receive care through a telephone visit. Do you consent to use a telephone visit for your medical care today? Yes  Ebony Acosta is a 50 y.o. female  who has continued upper respiratory symptoms which started over two weeks ago.  She was seen at the [facility] on 08/07/2020 for upper respiratory symptoms, cough, smothering and chest tightness. She was evaluated including a chest x ray, treated and released. Also, she was tested for COVID 19 due to her symptoms and her  had been diagnosed with COVID 19 on July 21 st. This test was negative. She has again been tested at an outpatient clinic and was negative again. She had a repeat X ray on 8/18/2020 with No radiographic evidence of acute cardiac or pulmonary disease. for Ebony does have COPD, mixed and she does continue to smoke although she is smoking much less in the past month. She is employed as a  at one of the hotel's in [place]. She reports after antibiotics, neb treatments, Prednisone she is at an improving 85-90 % baseline      URI    The current episode started 1 to 4 weeks ago. The problem has been gradually improving. Associated symptoms include congestion, coughing and rhinorrhea. Pertinent negatives include no chest pain, ear pain, headaches, nausea, sinus pain, sore throat, vomiting or wheezing. She has tried inhaler use and antihistamine (Neb treatments and Nasal spray) for the symptoms. The treatment provided moderate relief.      The following portions of the patient's history were reviewed and updated as appropriate: allergies, current medications, past family history, past medical history, past social history, past surgical history and problem list.    Current Outpatient Medications:   •  acetaminophen (ACETAMINOPHEN EXTRA STRENGTH) 500 MG tablet, Take 2 tablets by mouth Every 6 (Six) Hours As Needed for Mild Pain ., Disp: 180 tablet, Rfl: 5  •  albuterol (PROVENTIL) (2.5 MG/3ML) 0.083% nebulizer solution,  Take 2.5 mg by nebulization Every 4 (Four) Hours As Needed for Wheezing., Disp: 180 vial, Rfl: 5  •  albuterol sulfate HFA (Ventolin HFA) 108 (90 Base) MCG/ACT inhaler, Inhale 2 puffs Every 3 (Three) Hours As Needed for Wheezing., Disp: 2 inhaler, Rfl: 5  •  aspirin (Aspirin Adult Low Dose) 81 MG EC tablet, Take 1 tablet by mouth Daily., Disp: 30 tablet, Rfl: 5  •  atorvastatin (LIPITOR) 40 MG tablet, Take 1 tablet by mouth every night at bedtime., Disp: 30 tablet, Rfl: 5  •  busPIRone (BUSPAR) 10 MG tablet, Take 1 tablet by mouth 3 (Three) Times a Day., Disp: 90 tablet, Rfl: 5  •  cyclobenzaprine (FLEXERIL) 10 MG tablet, Take 1 tablet by mouth 3 (Three) Times a Day., Disp: 90 tablet, Rfl: 5  •  doxycycline (VIBRAMYCIN) 100 MG capsule, Take 100 mg by mouth 2 (Two) Times a Day., Disp: , Rfl:   •  DULoxetine (CYMBALTA) 30 MG capsule, Take 1 capsule by mouth Daily., Disp: 30 capsule, Rfl: 5  •  fluticasone (FLONASE) 50 MCG/ACT nasal spray, 2 sprays into the nostril(s) as directed by provider Daily., Disp: , Rfl:   •  gabapentin (NEURONTIN) 600 MG tablet, Take 1 tablet by mouth 3 (Three) Times a Day., Disp: 90 tablet, Rfl: 2  •  guaiFENesin (Mucinex) 600 MG 12 hr tablet, Take 1 tablet by mouth 2 (Two) Times a Day. Drink lots of water, Disp: 30 tablet, Rfl: 0  •  hydrOXYzine pamoate (VISTARIL) 25 MG capsule, Take 1 capsule by mouth 3 (Three) Times a Day As Needed for Anxiety., Disp: 90 capsule, Rfl: 5  •  lansoprazole (PREVACID) 30 MG capsule, Take 1 capsule by mouth 2 (Two) Times a Day., Disp: 60 capsule, Rfl: 5  •  loratadine (CLARITIN) 10 MG tablet, Take 1 tablet by mouth Daily As Needed for Allergies., Disp: 30 tablet, Rfl: 5  •  mometasone-formoterol (DULERA 200) 200-5 MCG/ACT inhaler, Inhale 2 puffs 2 (Two) Times a Day., Disp: 13 g, Rfl: 5  •  montelukast (SINGULAIR) 10 MG tablet, Take 1 tablet by mouth Daily., Disp: 30 tablet, Rfl: 5  •  promethazine-dextromethorphan (PROMETHAZINE-DM) 6.25-15 MG/5ML syrup, Take 5  "mL by mouth At Night As Needed for Cough., Disp: 150 mL, Rfl: 0  •  tiotropium bromide monohydrate (SPIRIVA RESPIMAT) 2.5 MCG/ACT aerosol solution inhaler, Inhale 2 puffs Every Morning., Disp: 1 inhaler, Rfl: 5  •  azelastine (ASTELIN) 0.1 % nasal spray, 2 sprays into the nostril(s) as directed by provider 2 (Two) Times a Day. Use in each nostril as directed, Disp: , Rfl:   •  predniSONE (DELTASONE) 20 MG tablet, Take 20 mg by mouth 3 (Three) Times a Day., Disp: , Rfl:     Allergies   Allergen Reactions   • Penicillins    • Percocet [Oxycodone-Acetaminophen]      Review of Systems   Constitutional: Positive for activity change and fatigue (Increasing). Negative for appetite change, chills, diaphoresis and fever.   HENT: Positive for congestion, postnasal drip and rhinorrhea. Negative for ear pain, sinus pressure, sinus pain and sore throat.    Eyes: Negative for discharge and redness.   Respiratory: Positive for cough and shortness of breath. Negative for chest tightness and wheezing.    Cardiovascular: Negative for chest pain.   Gastrointestinal: Negative for nausea and vomiting.   Neurological: Negative for dizziness, light-headedness and headaches.   Hematological: Negative for adenopathy.   Psychiatric/Behavioral: Positive for sleep disturbance (Due to cough).   All other systems reviewed and are negative.    Visit Vitals  Temp 98.9 °F (37.2 °C)   Ht 161.3 cm (63.5\")   Wt 43 kg (94 lb 12.8 oz)   BMI 16.53 kg/m²     Physical Exam   Constitutional: She is oriented to person, place, and time.   Neurological: She is alert and oriented to person, place, and time.   Psychiatric: She has a normal mood and affect. Her speech is normal and behavior is normal. Thought content normal.     Assessment/Plan   Diagnoses and all orders for this visit:    COPD mixed type (CMS/HCC)    Symptoms discussed with Ebony. Fortunately, she is making progress and improvement.Counseled regarding supportive measures including smoking " cessation.  At this time, in view of her employment I feel she continues to stay off work at least an additional four to five days. She will f/u with me on Friday, Friday August 28 th, 2020; sooner if problems/concerns occur.   This visit has been scheduled as a phone visit to comply with patient safety concerns in accordance with CDC recommendations. Total time of discussion was 12 minutes.      This document has been electronically signed by SIMON Arcos, JOCE-BC, TERRI  August 25, 2020 08:53

## 2020-08-28 ENCOUNTER — OFFICE VISIT (OUTPATIENT)
Dept: FAMILY MEDICINE CLINIC | Facility: CLINIC | Age: 50
End: 2020-08-28

## 2020-08-28 VITALS — TEMPERATURE: 98.9 F

## 2020-08-28 DIAGNOSIS — J44.9 COPD MIXED TYPE (HCC): Primary | Chronic | ICD-10-CM

## 2020-08-28 DIAGNOSIS — R05.9 COUGH: ICD-10-CM

## 2020-08-28 DIAGNOSIS — R11.0 NAUSEA: ICD-10-CM

## 2020-08-28 DIAGNOSIS — F17.200 CURRENT SMOKER: ICD-10-CM

## 2020-08-28 DIAGNOSIS — J30.89 CHRONIC NON-SEASONAL ALLERGIC RHINITIS: Chronic | ICD-10-CM

## 2020-08-28 PROCEDURE — 99442 PR PHYS/QHP TELEPHONE EVALUATION 11-20 MIN: CPT | Performed by: NURSE PRACTITIONER

## 2020-08-28 RX ORDER — DEXTROMETHORPHAN HYDROBROMIDE AND PROMETHAZINE HYDROCHLORIDE 15; 6.25 MG/5ML; MG/5ML
5 SYRUP ORAL NIGHTLY PRN
Qty: 150 ML | Refills: 1 | Status: SHIPPED | OUTPATIENT
Start: 2020-08-28 | End: 2020-09-15

## 2020-08-28 RX ORDER — ONDANSETRON 4 MG/1
4 TABLET, FILM COATED ORAL EVERY 8 HOURS PRN
Qty: 20 TABLET | Refills: 1 | Status: SHIPPED | OUTPATIENT
Start: 2020-08-28 | End: 2021-01-07 | Stop reason: SDUPTHER

## 2020-08-28 NOTE — PROGRESS NOTES
You have chosen to receive care through a telephone visit. Do you consent to use a telephone visit for your medical care today? Yes    History of Present Illness   Ebony Acosta is a 50 y.o. female  who has continued upper respiratory symptoms which started over two weeks ago.  She was seen at the [facility] on 08/07/2020 for upper respiratory symptoms, cough, smothering and chest tightness. She was evaluated including a chest x ray, treated and released. Also, she was tested for COVID 19 due to her symptoms and her  had been diagnosed with COVID 19 on July 21 st. This test was negative. She has again been tested at an outpatient clinic and was negative again. She had a repeat X ray on 8/18/2020 with No radiographic evidence of acute cardiac or pulmonary disease. Ebony does have COPD, mixed and  does continue to smoke although she is smoking much less in the past month. She is employed as a  at one of the hotel's in [place]. She reports after antibiotics, neb treatments, Prednisone. Today, she feels she is at her baseline and should be able to perform her duties      URI    The current episode started 1 to 4 weeks ago. The problem has been gradually improving. Associated symptoms include congestion, coughing and rhinorrhea. Pertinent negatives include no chest pain, ear pain, headaches, nausea, sinus pain, sore throat, vomiting or wheezing. She has been using inhalers, antihistamine, Neb treatments, Prednisone and Nasal spray for the symptoms.     The following portions of the patient's history were reviewed and updated as appropriate: allergies, current medications, past family history, past medical history, past social history, past surgical history and problem list.    Current Outpatient Medications:   •  acetaminophen (ACETAMINOPHEN EXTRA STRENGTH) 500 MG tablet, Take 2 tablets by mouth Every 6 (Six) Hours As Needed for Mild Pain ., Disp: 180 tablet, Rfl: 5  •  albuterol (PROVENTIL) (2.5  MG/3ML) 0.083% nebulizer solution, Take 2.5 mg by nebulization Every 4 (Four) Hours As Needed for Wheezing., Disp: 180 vial, Rfl: 5  •  albuterol sulfate HFA (Ventolin HFA) 108 (90 Base) MCG/ACT inhaler, Inhale 2 puffs Every 3 (Three) Hours As Needed for Wheezing., Disp: 2 inhaler, Rfl: 5  •  aspirin (Aspirin Adult Low Dose) 81 MG EC tablet, Take 1 tablet by mouth Daily., Disp: 30 tablet, Rfl: 5  •  atorvastatin (LIPITOR) 40 MG tablet, Take 1 tablet by mouth every night at bedtime., Disp: 30 tablet, Rfl: 5  •  busPIRone (BUSPAR) 10 MG tablet, Take 1 tablet by mouth 3 (Three) Times a Day., Disp: 90 tablet, Rfl: 5  •  cyclobenzaprine (FLEXERIL) 10 MG tablet, Take 1 tablet by mouth 3 (Three) Times a Day., Disp: 90 tablet, Rfl: 5  •  DULoxetine (CYMBALTA) 30 MG capsule, Take 1 capsule by mouth Daily., Disp: 30 capsule, Rfl: 5  •  fluticasone (FLONASE) 50 MCG/ACT nasal spray, 2 sprays into the nostril(s) as directed by provider Daily., Disp: , Rfl:   •  gabapentin (NEURONTIN) 600 MG tablet, Take 1 tablet by mouth 3 (Three) Times a Day., Disp: 90 tablet, Rfl: 2  •  guaiFENesin (Mucinex) 600 MG 12 hr tablet, Take 1 tablet by mouth 2 (Two) Times a Day. Drink lots of water, Disp: 30 tablet, Rfl: 0  •  hydrOXYzine pamoate (VISTARIL) 25 MG capsule, Take 1 capsule by mouth 3 (Three) Times a Day As Needed for Anxiety., Disp: 90 capsule, Rfl: 5  •  lansoprazole (PREVACID) 30 MG capsule, Take 1 capsule by mouth 2 (Two) Times a Day., Disp: 60 capsule, Rfl: 5  •  loratadine (CLARITIN) 10 MG tablet, Take 1 tablet by mouth Daily As Needed for Allergies., Disp: 30 tablet, Rfl: 5  •  mometasone-formoterol (DULERA 200) 200-5 MCG/ACT inhaler, Inhale 2 puffs 2 (Two) Times a Day., Disp: 13 g, Rfl: 5  •  montelukast (SINGULAIR) 10 MG tablet, Take 1 tablet by mouth Daily., Disp: 30 tablet, Rfl: 5  •  promethazine-dextromethorphan (PROMETHAZINE-DM) 6.25-15 MG/5ML syrup, Take 5 mL by mouth At Night As Needed for Cough., Disp: 150 mL, Rfl: 1  •   tiotropium bromide monohydrate (SPIRIVA RESPIMAT) 2.5 MCG/ACT aerosol solution inhaler, Inhale 2 puffs Every Morning., Disp: 1 inhaler, Rfl: 5  •  ondansetron (ZOFRAN) 4 MG tablet, Take 1 tablet by mouth Every 8 (Eight) Hours As Needed for Nausea or Vomiting., Disp: 20 tablet, Rfl: 1    Allergies   Allergen Reactions   • Penicillins    • Percocet [Oxycodone-Acetaminophen]      Review of Systems   Constitutional: Positive for activity change and fatigue. Negative for appetite change, chills, fever and unexpected weight change.        Improving and at baseline   HENT: Positive for rhinorrhea. Negative for congestion, sinus pressure and sinus pain.    Eyes: Negative for visual disturbance.   Respiratory: Positive for cough, shortness of breath and wheezing. Negative for chest tightness.         Stable and at baseline    Cardiovascular: Negative for chest pain, palpitations and leg swelling.   Gastrointestinal: Positive for nausea (At times). Negative for vomiting.   Endocrine: Negative for cold intolerance, heat intolerance, polydipsia, polyphagia and polyuria.   Musculoskeletal: Positive for arthralgias (Intermittent).   Skin: Negative for color change and rash.   Neurological: Negative for dizziness, tremors, speech difficulty, weakness, light-headedness and headaches.   Hematological: Negative for adenopathy.   Psychiatric/Behavioral: Negative for confusion, decreased concentration and suicidal ideas. The patient is not nervous/anxious.    All other systems reviewed and are negative.    Visit Vitals  Temp 98. °F (37.2 °C)     Physical Exam   Constitutional: She is oriented to person, place, and time.   Neurological: She is alert and oriented to person, place, and time.   Psychiatric: She has a normal mood and affect. Her speech is normal and behavior is normal.     Assessment/Plan   Diagnoses and all orders for this visit:    COPD mixed type (CMS/[facility])  Comments:  Stable at this time, Continue Spiriva, Dulera and  Ventolin Inh as prescribed. Nebulizer prn    Chronic non-seasonal allergic rhinitis  Comments:  Continue Singulair, Claritin and Flonase. Avoidance of irritants as much as possible    Cough  Comments:  Cough medication prescribed to be used prn  Orders:  -     promethazine-dextromethorphan (PROMETHAZINE-DM) 6.25-15 MG/5ML syrup; Take 5 mL by mouth At Night As Needed for Cough.    Nausea  Comments:  Zofran prn   Orders:  -     ondansetron (ZOFRAN) 4 MG tablet; Take 1 tablet by mouth Every 8 (Eight) Hours As Needed for Nausea or Vomiting.    Current smoker  Comments:  Encouraged smoking cessation.     Symptoms discussed and at this time, she feels she is physically ready to return to work. Her COPD is stable at this time, Continue Spiriva, Dulera and Ventolin Inh as prescribed. Nebulizer prn. She is to continue Singulair, Claritin and Flonase for her Chronic Non-Seasonal  AR. Avoidance of irritants as much as possible.Cough medication prescribed to be used prn and cautiously. Discussed to pace her activities as much as possible. CDC recommendations for infection control reinforced. Work excuse for her to return to work on Saturday, August 29 th.   This visit has been scheduled as a phone visit to comply with patient safety concerns in accordance with CDC recommendations. Total time of discussion was 20 minutes.      This document has been electronically signed by SIMON Arcos, SHELBI, TERRI  August 28, 2020 21:44

## 2020-09-08 DIAGNOSIS — M50.30 DDD (DEGENERATIVE DISC DISEASE), CERVICAL: ICD-10-CM

## 2020-09-08 RX ORDER — GABAPENTIN 600 MG/1
600 TABLET ORAL 3 TIMES DAILY
Qty: 90 TABLET | Refills: 0 | Status: SHIPPED | OUTPATIENT
Start: 2020-09-08 | End: 2020-09-15 | Stop reason: SDUPTHER

## 2020-09-08 RX ORDER — GABAPENTIN 600 MG/1
TABLET ORAL
Qty: 90 TABLET | Refills: 2 | OUTPATIENT
Start: 2020-09-08

## 2020-09-15 ENCOUNTER — OFFICE VISIT (OUTPATIENT)
Dept: FAMILY MEDICINE CLINIC | Facility: CLINIC | Age: 50
End: 2020-09-15

## 2020-09-15 DIAGNOSIS — E66.01 MORBIDLY OBESE (HCC): ICD-10-CM

## 2020-09-15 DIAGNOSIS — Z00.00 HEALTHCARE MAINTENANCE: ICD-10-CM

## 2020-09-15 DIAGNOSIS — M25.562 CHRONIC PAIN OF BOTH KNEES: ICD-10-CM

## 2020-09-15 DIAGNOSIS — K21.9 GASTROESOPHAGEAL REFLUX DISEASE WITHOUT ESOPHAGITIS: ICD-10-CM

## 2020-09-15 DIAGNOSIS — G89.29 CHRONIC PAIN OF BOTH KNEES: ICD-10-CM

## 2020-09-15 DIAGNOSIS — F17.200 SMOKER: ICD-10-CM

## 2020-09-15 DIAGNOSIS — M51.36 DDD (DEGENERATIVE DISC DISEASE), LUMBAR: ICD-10-CM

## 2020-09-15 DIAGNOSIS — J30.89 CHRONIC NON-SEASONAL ALLERGIC RHINITIS: ICD-10-CM

## 2020-09-15 DIAGNOSIS — M50.30 DDD (DEGENERATIVE DISC DISEASE), CERVICAL: ICD-10-CM

## 2020-09-15 DIAGNOSIS — J44.9 COPD MIXED TYPE (HCC): ICD-10-CM

## 2020-09-15 DIAGNOSIS — F41.9 CHRONIC ANXIETY: ICD-10-CM

## 2020-09-15 DIAGNOSIS — M25.561 CHRONIC PAIN OF BOTH KNEES: ICD-10-CM

## 2020-09-15 DIAGNOSIS — E78.2 MIXED HYPERLIPIDEMIA: Primary | ICD-10-CM

## 2020-09-15 PROCEDURE — 99442 PR PHYS/QHP TELEPHONE EVALUATION 11-20 MIN: CPT | Performed by: GENERAL PRACTICE

## 2020-09-15 RX ORDER — GABAPENTIN 600 MG/1
600 TABLET ORAL 3 TIMES DAILY
Qty: 90 TABLET | Refills: 2 | Status: SHIPPED | OUTPATIENT
Start: 2020-09-15 | End: 2021-01-04 | Stop reason: SDUPTHER

## 2020-09-15 NOTE — PROGRESS NOTES
Deonte Acosta is a 50 y.o. female.     This visit has been rescheduled as a phone visit to comply with patient safety concerns in accordance with CDC recommendations. Total time of discussion was 14 minutes.    You have chosen to receive care through a telephone visit. Do you consent to use a telephone visit for your medical care today? Yes    History of Present Illness     COPD  She was recently treated for an exacerbation.  The feels that her SOB, cough and wheezing have nearly returned to baseline.  She continues to deny any upper respiratory tract symptoms and there is no history of any chest pain, hemoptysis, fever, or chills. She reports that her symptoms become worse with activity and exposure to cold air. Her symptoms are reduced with rest and with inhaled inhaled medication.  She has been unable to obtain Dulera and Spiriva through her insurance and has apparently been off of these medications for over a month.  She has had multiple admissions to hospital for apparent exacerbations associated with pneumonia. She has repeatedly missed appointments for a CT of the chest, PFT and a pulmonology assessment.  She is currently smoking 1/2 pack/day.    GERD  Patient has a history of heartburn and bitter taste in mouth. Symptoms have been present for a number of years. The patient denies abdominal bloating, dysphagia, hematemesis, melena and unexpected weight loss. Symptoms appear to be worsened by large meals, lying down and fatty foods. Risk factors present for GERD include tobacco abuse, caffeine use and obesity. Risk factors absent for GERD are alcohol use and NSAID use. Studies performed so far include none. Treatments tried so far include proton pump inhibitor: lansoprazole. Results of treatment: good control of her symptoms provided she takes it twice daily    Bilateral Knee Pain  She returns with persistent knee pain worse on the right.  The pain is described as a sharp anterior ache worse with  weightbearing.  The pain does not radiate elsewhere but has been associated with intermittent swelling and a sense of giving way.  There is no history of any stiffness or locking.  She has yet to follow-up with the x-rays of her right knee that have been arranged    Neck Pain  Symptoms have been present for a number of years and are unchanged. The pain is located in the posterior neck bilaterally and radiates to the left posterior shoulder. The pain is described as sharp and stiffness and occurs intermittently. She rates her pain as moderate. Symptoms are exacerbated by any movement and prolonged posture.  Within the last year she has had similar pain about her lower back.  Symptoms are improved by gentle exercise/stretches, heat and cyclobenzaprine and gabapentin. She has tingling about the left shoulder associated with the neck pain. She denies any tingling elsewhere and has had no weakness, numbness, or changes in her bowel/bladder control. MRI of the cervical spine performed on 6/29/15 was reported as showing DDD and OA    Anxiety  She has a history of the following anxiety symptoms: nervousness, worrying, insomnia, fatigue, feelings of losing control. Onset of symptoms was a number of years ago.  Symptoms have been intermittent since then. She denies current suicidal and homicidal ideation. Risk factors: negative life event disappearance of her son. Treatment has included medication duloxetine, buspirone and hydroxyzine. She denies any apparent side effects    Dyslipidemia  Compliance with treatment has been fair. The patient exercises intermittently. She is currently being prescribed the following medication for her dyslipidemia - atorvastatin, omega 3 fatty acids. Patient denies side effects associated with her medications.      The following portions of the patient's history were reviewed and updated as appropriate: allergies, current medications, past medical history, past social history and problem  list.    Review of Systems   Constitutional: Positive for fatigue. Negative for appetite change, chills, fever and unexpected weight change.   HENT: Negative for congestion, ear pain, postnasal drip, rhinorrhea, sneezing and sore throat.    Eyes: Negative for itching and visual disturbance.   Respiratory: Positive for cough, shortness of breath and wheezing.    Cardiovascular: Negative for chest pain, palpitations and leg swelling.   Gastrointestinal: Negative for abdominal pain, blood in stool, constipation, diarrhea, nausea and vomiting.        Occasional heartburn   Genitourinary: Negative for dysuria and hematuria.   Musculoskeletal: Positive for arthralgias, back pain and neck pain. Negative for joint swelling and myalgias.   Skin: Negative for rash.   Neurological: Positive for numbness (left lateral neck and posterior shoulder). Negative for weakness and headaches.   Psychiatric/Behavioral: Positive for sleep disturbance. Negative for dysphoric mood and suicidal ideas. The patient is nervous/anxious.      Objective   Physical Exam  Constitutional:       Comments: Alert and oriented.  Bright and in good spirits.  No apparent distress, audible wheezing, or shortness of breath       Assessment/Plan   Problems Addressed this Visit        Cardiovascular and Mediastinum    Mixed hyperlipidemia  Encouraged to continue to work on her diet and exercise plan.  Continue current medication       Respiratory    Chronic non-seasonal allergic rhinitis    COPD mixed type (CMS/HCC)  With recent exacerbation  Reminded of the importance of smoking cessation  For compliance sake we will try to obtain Trelegy 1 puff daily through her insurance       Digestive    Gastroesophageal reflux disease without esophagitis   Symptoms are currently well controlled.  Reminded regarding lifestyle modification.  Continue current medication.    Morbidly obese (CMS/HCC)       Musculoskeletal and Integument    Chronic pain of both  knees  Encouraged to follow-up with x-rays of her right knee    Relevant Orders    XR Knee 3 View Right    DDD (degenerative disc disease), cervical  Reminded regarding symptomatic treatment.   Continue current medication    Relevant Medications    gabapentin (NEURONTIN) 600 MG tablet    DDD (degenerative disc disease), lumbar  We will also obtain x-rays of her L-spine    Relevant Orders    XR Spine Lumbar 2 or 3 View       Other    Chronic anxiety  Significant situational component.   Supportive therapy.   Continue current medication.    Healthcare maintenance  Recommended a flu shot  We will reschedule her mammogram and DEXA scan    Smoker

## 2020-09-17 ENCOUNTER — PRIOR AUTHORIZATION (OUTPATIENT)
Dept: FAMILY MEDICINE CLINIC | Facility: CLINIC | Age: 50
End: 2020-09-17

## 2020-09-17 DIAGNOSIS — J44.9 COPD MIXED TYPE (HCC): Primary | ICD-10-CM

## 2020-09-25 ENCOUNTER — TELEPHONE (OUTPATIENT)
Dept: FAMILY MEDICINE CLINIC | Facility: CLINIC | Age: 50
End: 2020-09-25

## 2020-09-25 NOTE — TELEPHONE ENCOUNTER
Pt is aware of this information.     ----- Message from Frank Parekh MD sent at 9/17/2020  5:38 PM EDT -----  Prasanna  Please let her know that rosalinda emailed a script to her pharm  Thanks  ----- Message -----  From: Swapna Lakhani MA  Sent: 9/17/2020   1:15 PM EDT  To: Frank Parekh MD    Approved    ----- Message -----  From: Frank Parekh MD  Sent: 9/15/2020   5:26 PM EDT  To: Swapna Lakhani MA    Please try to pa trelegy  Dx -COPD  Would take the place of both dulera and incruse

## 2020-12-08 ENCOUNTER — OFFICE VISIT (OUTPATIENT)
Dept: FAMILY MEDICINE CLINIC | Facility: CLINIC | Age: 50
End: 2020-12-08

## 2020-12-08 ENCOUNTER — LAB (OUTPATIENT)
Dept: FAMILY MEDICINE CLINIC | Facility: CLINIC | Age: 50
End: 2020-12-08

## 2020-12-08 DIAGNOSIS — J44.9 COPD MIXED TYPE (HCC): ICD-10-CM

## 2020-12-08 DIAGNOSIS — R09.89 SYMPTOMS OF UPPER RESPIRATORY INFECTION (URI): ICD-10-CM

## 2020-12-08 DIAGNOSIS — Z20.822 CLOSE EXPOSURE TO COVID-19 VIRUS: Primary | ICD-10-CM

## 2020-12-08 LAB
EXPIRATION DATE: NORMAL
FLUAV AG NPH QL: NEGATIVE
FLUBV AG NPH QL: NEGATIVE
INTERNAL CONTROL: NORMAL
Lab: NORMAL

## 2020-12-08 PROCEDURE — 99442 PR PHYS/QHP TELEPHONE EVALUATION 11-20 MIN: CPT | Performed by: NURSE PRACTITIONER

## 2020-12-08 PROCEDURE — U0004 COV-19 TEST NON-CDC HGH THRU: HCPCS | Performed by: NURSE PRACTITIONER

## 2020-12-08 PROCEDURE — 87804 INFLUENZA ASSAY W/OPTIC: CPT | Performed by: NURSE PRACTITIONER

## 2020-12-08 RX ORDER — DEXTROMETHORPHAN HYDROBROMIDE AND PROMETHAZINE HYDROCHLORIDE 15; 6.25 MG/5ML; MG/5ML
5 SYRUP ORAL 4 TIMES DAILY PRN
Qty: 180 ML | Refills: 0 | Status: SHIPPED | OUTPATIENT
Start: 2020-12-08 | End: 2021-01-07 | Stop reason: SDUPTHER

## 2020-12-08 NOTE — PROGRESS NOTES
You have chosen to receive care through a telephone visit. Do you consent to use a telephone visit for your medical care today? Yes  Ebony Acosta is a 50 y.o. female who is c/o upper respiratory symptoms. She has had a close exposure to COVID. 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      URI   This is a new problem. The current episode started in the past 7 days. The problem has been gradually worsening. Associated symptoms include congestion, coughing, diarrhea, headaches, nausea and rhinorrhea. Pertinent negatives include no chest pain, ear pain, rash, sinus pain, sore throat, vomiting or wheezing.        The following portions of the patient's history were reviewed and updated as appropriate: allergies, current medications, past family history, past medical history, past social history, past surgical history and problem list.    Current Outpatient Medications:   •  acetaminophen (ACETAMINOPHEN EXTRA STRENGTH) 500 MG tablet, Take 2 tablets by mouth Every 6 (Six) Hours As Needed for Mild Pain ., Disp: 180 tablet, Rfl: 5  •  albuterol (PROVENTIL) (2.5 MG/3ML) 0.083% nebulizer solution, Take 2.5 mg by nebulization Every 4 (Four) Hours As Needed for Wheezing., Disp: 180 vial, Rfl: 5  •  albuterol sulfate HFA (Ventolin HFA) 108 (90 Base) MCG/ACT inhaler, Inhale 2 puffs Every 3 (Three) Hours As Needed for Wheezing., Disp: 2 inhaler, Rfl: 5  •  aspirin (Aspirin Adult Low Dose) 81 MG EC tablet, Take 1 tablet by mouth Daily., Disp: 30 tablet, Rfl: 5  •  atorvastatin (LIPITOR) 40 MG tablet, Take 1 tablet by mouth every night at bedtime., Disp: 30 tablet, Rfl: 5  •  busPIRone (BUSPAR) 10 MG tablet, Take 1 tablet by mouth 3 (Three) Times a Day., Disp: 90 tablet, Rfl: 5  •  cyclobenzaprine (FLEXERIL) 10 MG tablet, Take 1 tablet by mouth 3 (Three) Times a Day., Disp: 90 tablet, Rfl: 5  •  DULoxetine (CYMBALTA) 30 MG capsule, Take 1 capsule by mouth Daily., Disp: 30 capsule, Rfl: 5  •  fluticasone (FLONASE) 50  MCG/ACT nasal spray, 2 sprays into the nostril(s) as directed by provider Daily., Disp: , Rfl:   •  Fluticasone-Umeclidin-Vilant 100-62.5-25 MCG/INH aerosol powder , Inhale 1 puff Every Morning., Disp: 28 each, Rfl: 5  •  gabapentin (NEURONTIN) 600 MG tablet, Take 1 tablet by mouth 3 (Three) Times a Day., Disp: 90 tablet, Rfl: 2  •  hydrOXYzine pamoate (VISTARIL) 25 MG capsule, Take 1 capsule by mouth 3 (Three) Times a Day As Needed for Anxiety., Disp: 90 capsule, Rfl: 5  •  lansoprazole (PREVACID) 30 MG capsule, Take 1 capsule by mouth 2 (Two) Times a Day., Disp: 60 capsule, Rfl: 5  •  loratadine (CLARITIN) 10 MG tablet, Take 1 tablet by mouth Daily As Needed for Allergies., Disp: 30 tablet, Rfl: 5  •  montelukast (SINGULAIR) 10 MG tablet, Take 1 tablet by mouth Daily., Disp: 30 tablet, Rfl: 5  •  ondansetron (ZOFRAN) 4 MG tablet, Take 1 tablet by mouth Every 8 (Eight) Hours As Needed for Nausea or Vomiting., Disp: 20 tablet, Rfl: 1  •  promethazine-dextromethorphan (PROMETHAZINE-DM) 6.25-15 MG/5ML syrup, Take 5 mL by mouth 4 (Four) Times a Day As Needed for Cough., Disp: 180 mL, Rfl: 0    Allergies   Allergen Reactions   • Penicillins    • Percocet [Oxycodone-Acetaminophen]        Review of Systems   Constitutional: Positive for activity change, appetite change, chills and fatigue. Negative for fever.   HENT: Positive for congestion and rhinorrhea. Negative for ear pain, sinus pressure, sinus pain, sore throat, tinnitus and trouble swallowing.         Loss of smell/taste: Positive   Eyes: Negative for pain, discharge, redness and itching.   Respiratory: Positive for cough and shortness of breath. Negative for wheezing.    Cardiovascular: Negative for chest pain, palpitations and leg swelling.   Gastrointestinal: Positive for diarrhea and nausea. Negative for vomiting.   Musculoskeletal: Positive for arthralgias (Chronic and stable) and myalgias.   Skin: Negative for color change and rash.   Allergic/Immunologic:  Positive for environmental allergies.   Neurological: Positive for headaches. Negative for dizziness and light-headedness.   Hematological: Negative for adenopathy.   Psychiatric/Behavioral: Negative for sleep disturbance.     There were no vitals taken for this visit.      Physical Exam  Constitutional:       General: She is not in acute distress.     Comments: Hoarse voice with intermittent coughing.   Pulmonary:      Effort: No respiratory distress.   Neurological:      Mental Status: She is alert.   Psychiatric:         Mood and Affect: Mood normal.         Speech: Speech normal.         Behavior: Behavior is cooperative.         Thought Content: Thought content normal.       Lab Results (last 24 hours)     Procedure Component Value Units Date/Time    COVID-19 PCR, [facility], NP SWAB IN [facility] V[facility]AL TRANSPORT MEDIA 24-30 HR TAT - Swab, Nasopharynx [[id number]] Collected: 12/08/20 1253    Specimen: Swab from Nasopharynx Updated: 12/08/20 1253    POC Influenza A / B [[id number]] Collected: 12/08/20 1322    Specimen: Swab Updated: 12/08/20 1323     Rapid Influenza A Ag Negative     Rapid Influenza B Ag Negative     Internal Control Passed     Lot Number [id number]     Expiration Date 01/31/2022        Assessment/Plan   Diagnoses and all orders for this visit:    1. Close exposure to COVID-19 virus (Primary)  -     COVID-19 PCR, [facility], NP SWAB IN [facility] VIRAL TRANSPORT MEDIA 24-30 HR TAT - Swab, Nasopharynx    2. Symptoms of upper respiratory infection (URI)  -     POC Influenza A / B    3. COPD mixed type (CMS/HCC)  -     promethazine-dextromethorphan (PROMETHAZINE-DM) 6.25-15 MG/5ML syrup; Take 5 mL by mouth 4 (Four) Times a Day As Needed for Cough.  Dispense: 180 mL; Refill: 0  -     Home Nebulizer Accessories    Symptoms discussed with Ebony. Ordered Influenza A&B and COVID testing. Her Influenza A&B were negative. She will be notified when the results of her COVID test is available. She will remain quarantine  until test results are available. She has a nebulizer but needs accessories which was faxed to her pharmacy. Counseled regarding supportive care measures. Encouraged her to seek further medical evaluation if symptoms worsen or do not improve. Work excuse provided for her to be off work until further notice.   This visit has been scheduled as a phone visit to comply with patient safety concerns in accordance with CDC recommendations. Total time of discussion was 20 minutes.      This document has been electronically signed by SIMON Arcos, TERRI MARIO  December 8, 2020 18:08 EST

## 2020-12-09 LAB — SARS-COV-2 RNA RESP QL NAA+PROBE: NOT DETECTED

## 2021-01-04 DIAGNOSIS — M50.30 DDD (DEGENERATIVE DISC DISEASE), CERVICAL: ICD-10-CM

## 2021-01-04 RX ORDER — GABAPENTIN 600 MG/1
600 TABLET ORAL 3 TIMES DAILY
Qty: 90 TABLET | Refills: 0 | Status: SHIPPED | OUTPATIENT
Start: 2021-01-04 | End: 2021-02-05

## 2021-01-04 NOTE — TELEPHONE ENCOUNTER
Caller: Ebony Acosta    Relationship: Self    Best call back number: [phone]    Medication needed:   Requested Prescriptions     Pending Prescriptions Disp Refills   • gabapentin (NEURONTIN) 600 MG tablet 90 tablet 2     Sig: Take 1 tablet by mouth 3 (Three) Times a Day.       When do you need the refill by: 01/04/2021    What details did the patient provide when requesting the medication: PATIENT HAS ENOUGH MEDICATION FOR ONLY TODAY 01/04/2021    Does the patient have less than a 3 day supply:  [x] Yes  [] No    What is the patient's preferred pharmacy: [facility] [place], [place] - [address] [place] [address] [phone] [facility] [phone] FX

## 2021-01-07 ENCOUNTER — OFFICE VISIT (OUTPATIENT)
Dept: FAMILY MEDICINE CLINIC | Facility: CLINIC | Age: 51
End: 2021-01-07

## 2021-01-07 ENCOUNTER — LAB (OUTPATIENT)
Dept: FAMILY MEDICINE CLINIC | Facility: CLINIC | Age: 51
End: 2021-01-07

## 2021-01-07 VITALS — BODY MASS INDEX: 35.68 KG/M2 | WEIGHT: 209 LBS | HEIGHT: 64 IN

## 2021-01-07 DIAGNOSIS — Z20.822 COUGH WITH EXPOSURE TO COVID-19 VIRUS: Primary | ICD-10-CM

## 2021-01-07 DIAGNOSIS — M79.10 MYALGIA: ICD-10-CM

## 2021-01-07 DIAGNOSIS — J44.1 COPD WITH ACUTE EXACERBATION (HCC): Chronic | ICD-10-CM

## 2021-01-07 DIAGNOSIS — R05.8 COUGH WITH EXPOSURE TO COVID-19 VIRUS: Primary | ICD-10-CM

## 2021-01-07 DIAGNOSIS — R11.0 NAUSEA: ICD-10-CM

## 2021-01-07 LAB
FLUAV AG NPH QL: NEGATIVE
FLUBV AG NPH QL IA: NEGATIVE

## 2021-01-07 PROCEDURE — 87804 INFLUENZA ASSAY W/OPTIC: CPT | Performed by: NURSE PRACTITIONER

## 2021-01-07 PROCEDURE — 99442 PR PHYS/QHP TELEPHONE EVALUATION 11-20 MIN: CPT | Performed by: NURSE PRACTITIONER

## 2021-01-07 PROCEDURE — U0004 COV-19 TEST NON-CDC HGH THRU: HCPCS | Performed by: NURSE PRACTITIONER

## 2021-01-07 RX ORDER — DEXTROMETHORPHAN HYDROBROMIDE AND PROMETHAZINE HYDROCHLORIDE 15; 6.25 MG/5ML; MG/5ML
5 SYRUP ORAL 4 TIMES DAILY PRN
Qty: 180 ML | Refills: 1 | Status: SHIPPED | OUTPATIENT
Start: 2021-01-07 | End: 2021-08-14

## 2021-01-07 RX ORDER — ONDANSETRON 4 MG/1
4 TABLET, FILM COATED ORAL EVERY 8 HOURS PRN
Qty: 20 TABLET | Refills: 1 | Status: SHIPPED | OUTPATIENT
Start: 2021-01-07 | End: 2021-04-05

## 2021-01-07 RX ORDER — ALBUTEROL SULFATE 2.5 MG/3ML
2.5 SOLUTION RESPIRATORY (INHALATION) EVERY 4 HOURS PRN
Qty: 180 VIAL | Refills: 5 | Status: SHIPPED | OUTPATIENT
Start: 2021-01-07 | End: 2021-08-14 | Stop reason: SDUPTHER

## 2021-01-07 NOTE — PROGRESS NOTES
You have chosen to receive care through a telephone visit. Do you consent to use a telephone visit for your medical care today? Yes    History of Present Illness   Ebony Acosta is a 50 y.o. female who is c/o respiratory symptoms which started two days ago and worsening. 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      Respiratory Symptoms  This is a new problem. The current episode started in the past 7 days. The problem has been gradually worsening. Maximum temperature: Unsure. Associated symptoms include congestion, coughing, diarrhea, nausea, rhinorrhea and wheezing. Pertinent negatives include no chest pain, ear pain, rash, sinus pain, sneezing, sore throat or vomiting. She has tried inhaler use and acetaminophen for the symptoms. The treatment provided mild relief.     The following portions of the patient's history were reviewed and updated as appropriate: allergies, current medications, past family history, past medical history, past social history, past surgical history and problem list.    Current Outpatient Medications:   •  acetaminophen (ACETAMINOPHEN EXTRA STRENGTH) 500 MG tablet, Take 2 tablets by mouth Every 6 (Six) Hours As Needed for Mild Pain ., Disp: 180 tablet, Rfl: 5  •  albuterol (PROVENTIL) (2.5 MG/3ML) 0.083% nebulizer solution, Take 2.5 mg by nebulization Every 4 (Four) Hours As Needed for Wheezing., Disp: 180 vial, Rfl: 5  •  albuterol sulfate HFA (Ventolin HFA) 108 (90 Base) MCG/ACT inhaler, Inhale 2 puffs Every 3 (Three) Hours As Needed for Wheezing., Disp: 2 inhaler, Rfl: 5  •  aspirin (Aspirin Adult Low Dose) 81 MG EC tablet, Take 1 tablet by mouth Daily., Disp: 30 tablet, Rfl: 5  •  atorvastatin (LIPITOR) 40 MG tablet, Take 1 tablet by mouth every night at bedtime., Disp: 30 tablet, Rfl: 5  •  busPIRone (BUSPAR) 10 MG tablet, Take 1 tablet by mouth 3 (Three) Times a Day., Disp: 90 tablet, Rfl: 5  •   cyclobenzaprine (FLEXERIL) 10 MG tablet, Take 1 tablet by mouth 3 (Three) Times a Day., Disp: 90 tablet, Rfl: 5  •  DULoxetine (CYMBALTA) 30 MG capsule, Take 1 capsule by mouth Daily., Disp: 30 capsule, Rfl: 5  •  fluticasone (FLONASE) 50 MCG/ACT nasal spray, 2 sprays into the nostril(s) as directed by provider Daily., Disp: , Rfl:   •  Fluticasone-Umeclidin-Vilant 100-62.5-25 MCG/INH aerosol powder , Inhale 1 puff Every Morning., Disp: 28 each, Rfl: 5  •  gabapentin (NEURONTIN) 600 MG tablet, Take 1 tablet by mouth 3 (Three) Times a Day., Disp: 90 tablet, Rfl: 0  •  hydrOXYzine pamoate (VISTARIL) 25 MG capsule, Take 1 capsule by mouth 3 (Three) Times a Day As Needed for Anxiety., Disp: 90 capsule, Rfl: 5  •  lansoprazole (PREVACID) 30 MG capsule, Take 1 capsule by mouth 2 (Two) Times a Day., Disp: 60 capsule, Rfl: 5  •  loratadine (CLARITIN) 10 MG tablet, Take 1 tablet by mouth Daily As Needed for Allergies., Disp: 30 tablet, Rfl: 5  •  montelukast (SINGULAIR) 10 MG tablet, Take 1 tablet by mouth Daily., Disp: 30 tablet, Rfl: 5  •  ondansetron (ZOFRAN) 4 MG tablet, Take 1 tablet by mouth Every 8 (Eight) Hours As Needed for Nausea or Vomiting., Disp: 20 tablet, Rfl: 1  •  promethazine-dextromethorphan (PROMETHAZINE-DM) 6.25-15 MG/5ML syrup, Take 5 mL by mouth 4 (Four) Times a Day As Needed for Cough., Disp: 180 mL, Rfl: 1  •  doxycycline (VIBRAMYCIN) 100 MG capsule, Take 1 capsule by mouth 2 (Two) Times a Day for 10 days., Disp: 20 capsule, Rfl: 0  •  fluconazole (Diflucan) 150 MG tablet, Take 1 tablet by mouth Daily., Disp: 2 tablet, Rfl: 0  •  predniSONE (DELTASONE) 20 MG tablet, Take 1 tablet by mouth 2 (Two) Times a Day for 7 days., Disp: 14 tablet, Rfl: 0    Allergies   Allergen Reactions   • Penicillins    • Percocet [Oxycodone-Acetaminophen]      Review of Systems   Constitutional: Positive for activity change, appetite change and chills. Negative for fever (Unsure).   HENT: Positive for congestion and  "rhinorrhea. Negative for ear discharge, ear pain, sinus pain, sneezing, sore throat and tinnitus.         Loss of taste: decrease  Smell: unsure   Eyes: Negative for pain, discharge, redness, itching and visual disturbance.   Respiratory: Positive for cough, shortness of breath and wheezing.         Chronic and worse than baseline. Does continue to smoke   Cardiovascular: Negative for chest pain, palpitations and leg swelling.   Gastrointestinal: Positive for diarrhea and nausea. Negative for vomiting.   Musculoskeletal: Positive for myalgias.   Skin: Negative for color change and rash.   Allergic/Immunologic: Positive for environmental allergies.   Neurological: Negative for dizziness.   Psychiatric/Behavioral: Positive for sleep disturbance (Due to cough).     Visit Vitals  Ht 161.3 cm (63.5\")   Wt 94.8 kg (209 lb)   BMI 36.44 kg/m²     Physical Exam  Constitutional:       General: She is not in acute distress.     Comments: Pleasant; coughing during visit   Pulmonary:      Effort: No respiratory distress.   Neurological:      Mental Status: She is alert.   Psychiatric:         Mood and Affect: Mood normal.         Speech: Speech normal.         Behavior: Behavior is cooperative.         Cognition and Memory: Cognition normal.       Results for orders placed or performed in visit on 01/07/21   COVID-19 PCR, [facility], NP SWAB IN [facility] V[facility]AL TRANSPORT MEDIA 24-30 HR TAT - Swab, Nasopharynx    Specimen: Nasopharynx; Swab   Result Value Ref Range    SARS-CoV-2 BETH Not Detected Not Detected   Influenza Antigen, Rapid - Swab, Nasopharynx    Specimen: Nasopharynx; Swab   Result Value Ref Range    Influenza A Ag, EIA Negative Negative    Influenza B Ag, EIA Negative Negative       Assessment/Plan   Diagnoses and all orders for this visit:    1. Cough with exposure to COVID-19 virus (Primary)  Comments:  COVID test was negative and she was informed. She continues to have respiratory symptoms so recommended she continue " to be off work  Orders:  -     COVID-19 PCR, [facility], NP SWAB IN [facility] VIRAL TRANSPORT MEDIA 24-30 HR TAT - Swab, Nasopharynx  -     Influenza Antigen, Rapid - Swab, Nasopharynx  -     promethazine-dextromethorphan (PROMETHAZINE-DM) 6.25-15 MG/5ML syrup; Take 5 mL by mouth 4 (Four) Times a Day As Needed for Cough.  Dispense: 180 mL; Refill: 1    2. Myalgia  Comments:  Influenza A and B were both negative.   Orders:  -     Influenza Antigen, Rapid - Swab, Nasopharynx    3. COPD with acute exacerbation (CMS/[facility])  Comments:  Findings and recommendations discussed. Will start Prednisone and Doxycycline and Prednisone. Aggressive neb treatments and smoking cessation encouraged.  Orders:  -     albuterol (PROVENTIL) (2.5 MG/3ML) 0.083% nebulizer solution; Take 2.5 mg by nebulization Every 4 (Four) Hours As Needed for Wheezing.  Dispense: 180 vial; Refill: 5  -     predniSONE (DELTASONE) 20 MG tablet; Take 1 tablet by mouth 2 (Two) Times a Day for 7 days.  Dispense: 14 tablet; Refill: 0  -     doxycycline (VIBRAMYCIN) 100 MG capsule; Take 1 capsule by mouth 2 (Two) Times a Day for 10 days.  Dispense: 20 capsule; Refill: 0    4. Nausea  Comments:  Zofran prn   Orders:  -     ondansetron (ZOFRAN) 4 MG tablet; Take 1 tablet by mouth Every 8 (Eight) Hours As Needed for Nausea or Vomiting.  Dispense: 20 tablet; Refill: 1    Other orders  -     fluconazole (Diflucan) 150 MG tablet; Take 1 tablet by mouth Daily.  Dispense: 2 tablet; Refill: 0    Findings and recommendations discussed with her. Reviewed test results with her and treatment options. Will prescribed empiric antibiotic with Prednisone. Aggressive neb treatments and smoking cessation. S/S of concern reviewed and if occur to seek further medical evaluation. She will f/u with me via phone on Tuesday, January 12 th.  This visit has been scheduled as a phone visit to comply with patient safety concerns in accordance with CDC recommendations. Total time of discussion was  20  minutes.      This document has been electronically signed by SIMON Arcos, SHELBI, TERRI  January 7, 2021 14:58 EST

## 2021-01-08 LAB — SARS-COV-2 RNA RESP QL NAA+PROBE: NOT DETECTED

## 2021-01-09 RX ORDER — PREDNISONE 20 MG/1
20 TABLET ORAL 2 TIMES DAILY
Qty: 14 TABLET | Refills: 0 | Status: SHIPPED | OUTPATIENT
Start: 2021-01-09 | End: 2021-01-16

## 2021-01-09 RX ORDER — FLUCONAZOLE 150 MG/1
150 TABLET ORAL DAILY
Qty: 2 TABLET | Refills: 0 | Status: SHIPPED | OUTPATIENT
Start: 2021-01-09 | End: 2021-08-14

## 2021-01-09 RX ORDER — DOXYCYCLINE HYCLATE 100 MG/1
100 CAPSULE ORAL 2 TIMES DAILY
Qty: 20 CAPSULE | Refills: 0 | Status: SHIPPED | OUTPATIENT
Start: 2021-01-09 | End: 2021-01-19

## 2021-01-12 ENCOUNTER — OFFICE VISIT (OUTPATIENT)
Dept: FAMILY MEDICINE CLINIC | Facility: CLINIC | Age: 51
End: 2021-01-12

## 2021-01-12 ENCOUNTER — LAB (OUTPATIENT)
Dept: FAMILY MEDICINE CLINIC | Facility: CLINIC | Age: 51
End: 2021-01-12

## 2021-01-12 VITALS — WEIGHT: 209 LBS | HEIGHT: 64 IN | BODY MASS INDEX: 35.68 KG/M2

## 2021-01-12 DIAGNOSIS — R05.8 COUGH WITH EXPOSURE TO COVID-19 VIRUS: ICD-10-CM

## 2021-01-12 DIAGNOSIS — J44.9 COPD MIXED TYPE (HCC): Chronic | ICD-10-CM

## 2021-01-12 DIAGNOSIS — Z20.822 COUGH WITH EXPOSURE TO COVID-19 VIRUS: ICD-10-CM

## 2021-01-12 DIAGNOSIS — R05.8 COUGH WITH EXPOSURE TO COVID-19 VIRUS: Primary | ICD-10-CM

## 2021-01-12 DIAGNOSIS — Z20.822 COUGH WITH EXPOSURE TO COVID-19 VIRUS: Primary | ICD-10-CM

## 2021-01-12 LAB
FLUAV RNA RESP QL NAA+PROBE: NOT DETECTED
FLUBV RNA RESP QL NAA+PROBE: NOT DETECTED
SARS-COV-2 RNA RESP QL NAA+PROBE: NOT DETECTED

## 2021-01-12 PROCEDURE — 87636 SARSCOV2 & INF A&B AMP PRB: CPT | Performed by: GENERAL PRACTICE

## 2021-01-12 PROCEDURE — 99442 PR PHYS/QHP TELEPHONE EVALUATION 11-20 MIN: CPT | Performed by: NURSE PRACTITIONER

## 2021-01-12 NOTE — PROGRESS NOTES
You have chosen to receive care through a telephone visit. Do you consent to use a telephone visit for your medical care today? Yes  Ebony Acosta is a 50 y.o. female is c/o respiratory symptoms which started last week after she was exposed to COVID patient. She was tested last Thursday but she does continue to have respiratory symptoms. She has been diagnosed with COPD and does continue to smoke.      URI   The current episode started 1 to 4 weeks ago. The problem has been waxing and waning. There has been no fever. Associated symptoms include congestion, coughing, diarrhea, headaches and rhinorrhea. Pertinent negatives include no chest pain, ear pain, nausea or vomiting. She has tried inhaler use (Neb treatments) for the symptoms. The treatment provided mild relief.        The following portions of the patient's history were reviewed and updated as appropriate: allergies, current medications, past family history, past medical history, past social history, past surgical history and problem list.    Current Outpatient Medications:   •  acetaminophen (ACETAMINOPHEN EXTRA STRENGTH) 500 MG tablet, Take 2 tablets by mouth Every 6 (Six) Hours As Needed for Mild Pain ., Disp: 180 tablet, Rfl: 5  •  albuterol (PROVENTIL) (2.5 MG/3ML) 0.083% nebulizer solution, Take 2.5 mg by nebulization Every 4 (Four) Hours As Needed for Wheezing., Disp: 180 vial, Rfl: 5  •  albuterol sulfate HFA (Ventolin HFA) 108 (90 Base) MCG/ACT inhaler, Inhale 2 puffs Every 3 (Three) Hours As Needed for Wheezing., Disp: 2 inhaler, Rfl: 5  •  aspirin (Aspirin Adult Low Dose) 81 MG EC tablet, Take 1 tablet by mouth Daily., Disp: 30 tablet, Rfl: 5  •  atorvastatin (LIPITOR) 40 MG tablet, Take 1 tablet by mouth every night at bedtime., Disp: 30 tablet, Rfl: 5  •  busPIRone (BUSPAR) 10 MG tablet, Take 1 tablet by mouth 3 (Three) Times a Day., Disp: 90 tablet, Rfl: 5  •  cyclobenzaprine (FLEXERIL) 10 MG tablet, Take 1 tablet by mouth 3 (Three) Times a  Day., Disp: 90 tablet, Rfl: 5  •  doxycycline (VIBRAMYCIN) 100 MG capsule, Take 1 capsule by mouth 2 (Two) Times a Day for 10 days., Disp: 20 capsule, Rfl: 0  •  DULoxetine (CYMBALTA) 30 MG capsule, Take 1 capsule by mouth Daily., Disp: 30 capsule, Rfl: 5  •  fluconazole (Diflucan) 150 MG tablet, Take 1 tablet by mouth Daily., Disp: 2 tablet, Rfl: 0  •  fluticasone (FLONASE) 50 MCG/ACT nasal spray, 2 sprays into the nostril(s) as directed by provider Daily., Disp: , Rfl:   •  Fluticasone-Umeclidin-Vilant 100-62.5-25 MCG/INH aerosol powder , Inhale 1 puff Every Morning., Disp: 28 each, Rfl: 5  •  gabapentin (NEURONTIN) 600 MG tablet, Take 1 tablet by mouth 3 (Three) Times a Day., Disp: 90 tablet, Rfl: 0  •  hydrOXYzine pamoate (VISTARIL) 25 MG capsule, Take 1 capsule by mouth 3 (Three) Times a Day As Needed for Anxiety., Disp: 90 capsule, Rfl: 5  •  lansoprazole (PREVACID) 30 MG capsule, Take 1 capsule by mouth 2 (Two) Times a Day., Disp: 60 capsule, Rfl: 5  •  loratadine (CLARITIN) 10 MG tablet, Take 1 tablet by mouth Daily As Needed for Allergies., Disp: 30 tablet, Rfl: 5  •  montelukast (SINGULAIR) 10 MG tablet, Take 1 tablet by mouth Daily., Disp: 30 tablet, Rfl: 5  •  ondansetron (ZOFRAN) 4 MG tablet, Take 1 tablet by mouth Every 8 (Eight) Hours As Needed for Nausea or Vomiting., Disp: 20 tablet, Rfl: 1  •  predniSONE (DELTASONE) 20 MG tablet, Take 1 tablet by mouth 2 (Two) Times a Day for 7 days., Disp: 14 tablet, Rfl: 0  •  promethazine-dextromethorphan (PROMETHAZINE-DM) 6.25-15 MG/5ML syrup, Take 5 mL by mouth 4 (Four) Times a Day As Needed for Cough., Disp: 180 mL, Rfl: 1    Allergies   Allergen Reactions   • Penicillins    • Percocet [Oxycodone-Acetaminophen]        Review of Systems   Constitutional: Positive for appetite change, chills and fatigue.   HENT: Positive for congestion and rhinorrhea. Negative for ear pain.         Loss of sense of smell/taste: negative   Respiratory: Positive for cough, chest  "tightness and shortness of breath.    Cardiovascular: Negative for chest pain.   Gastrointestinal: Positive for diarrhea. Negative for nausea and vomiting.   Endocrine: Positive for polydipsia.   Musculoskeletal: Positive for myalgias.   Neurological: Positive for dizziness and headaches.     Visit Vitals  Ht 161.3 cm (63.5\")   Wt 94.8 kg (209 lb)   BMI 36.44 kg/m²     Physical Exam  Constitutional:       General: She is not in acute distress.     Comments: Pleasant; is coughing intermittently during visit with a dry nonproductive cough   Pulmonary:      Effort: No respiratory distress.   Neurological:      Mental Status: She is alert.   Psychiatric:         Behavior: Behavior is cooperative.       Assessment/Plan   Diagnoses and all orders for this visit:    1. Cough with exposure to COVID-19 virus (Primary)  Comments:  COVID testing  Orders:  -     COVID-19 PCR, [facility], NP SWAB IN [facility] VIRAL TRANSPORT MEDIA 24-30 HR TAT - Swab, Nasopharynx; Future    2. COPD mixed type (CMS/HCC)  Comments:  Medications including Doxycyline and Prednisone were sent in on Saturday for her but she did not pick them up.       Symptoms discussed with Ebony. Will retest her for COVID today. She will continue quarantine until test results are available. Medications including Doxycyline and Prednisone were sent in on Saturday for her but she did not pick them up. Encouraged her to  her medications and begin taking them. S/S of concern reviewed and if occur to seek further medical evaluation. She will continue to be off from work until test results are available.   This visit has been scheduled as a phone visit to comply with patient safety concerns in accordance with CDC recommendations. Total time of discussion was 12 minutes.    This document has been electronically signed by SIMON Arcos, JOCE-BC, TERRI  January 12, 2021 10:39 EST               "

## 2021-01-15 ENCOUNTER — OFFICE VISIT (OUTPATIENT)
Dept: FAMILY MEDICINE CLINIC | Facility: CLINIC | Age: 51
End: 2021-01-15

## 2021-01-15 VITALS — TEMPERATURE: 98.7 F | WEIGHT: 209 LBS | HEIGHT: 64 IN | BODY MASS INDEX: 35.68 KG/M2

## 2021-01-15 DIAGNOSIS — J44.9 COPD MIXED TYPE (HCC): Primary | ICD-10-CM

## 2021-01-15 DIAGNOSIS — J30.89 CHRONIC NON-SEASONAL ALLERGIC RHINITIS: ICD-10-CM

## 2021-01-15 PROCEDURE — 99442 PR PHYS/QHP TELEPHONE EVALUATION 11-20 MIN: CPT | Performed by: NURSE PRACTITIONER

## 2021-01-15 NOTE — PROGRESS NOTES
You have chosen to receive care through a telephone visit. Do you consent to use a telephone visit for your medical care today? Yes    History of Present Illness   Ebony Acosta is a 50 y.o. female who has had respiratory symptoms which started last week after she was exposed to COVID patient. She was tested last Thursday and was negative. Another, COVID test was ordered on 01/12 which was also negative. At that time, she had not started Doxycycline and Prednisone which she then started. At this time, she is feeling her at her baseline.     The following portions of the patient's history were reviewed and updated as appropriate: allergies, current medications, past family history, past medical history, past social history, past surgical history and problem list.    Current Outpatient Medications:   •  acetaminophen (ACETAMINOPHEN EXTRA STRENGTH) 500 MG tablet, Take 2 tablets by mouth Every 6 (Six) Hours As Needed for Mild Pain ., Disp: 180 tablet, Rfl: 5  •  albuterol (PROVENTIL) (2.5 MG/3ML) 0.083% nebulizer solution, Take 2.5 mg by nebulization Every 4 (Four) Hours As Needed for Wheezing., Disp: 180 vial, Rfl: 5  •  albuterol sulfate HFA (Ventolin HFA) 108 (90 Base) MCG/ACT inhaler, Inhale 2 puffs Every 3 (Three) Hours As Needed for Wheezing., Disp: 2 inhaler, Rfl: 5  •  aspirin (Aspirin Adult Low Dose) 81 MG EC tablet, Take 1 tablet by mouth Daily., Disp: 30 tablet, Rfl: 5  •  atorvastatin (LIPITOR) 40 MG tablet, Take 1 tablet by mouth every night at bedtime., Disp: 30 tablet, Rfl: 5  •  busPIRone (BUSPAR) 10 MG tablet, Take 1 tablet by mouth 3 (Three) Times a Day., Disp: 90 tablet, Rfl: 5  •  cyclobenzaprine (FLEXERIL) 10 MG tablet, Take 1 tablet by mouth 3 (Three) Times a Day., Disp: 90 tablet, Rfl: 5  •  doxycycline (VIBRAMYCIN) 100 MG capsule, Take 1 capsule by mouth 2 (Two) Times a Day for 10 days., Disp: 20 capsule, Rfl: 0  •  DULoxetine (CYMBALTA) 30 MG capsule, Take 1 capsule by mouth Daily., Disp: 30  capsule, Rfl: 5  •  fluconazole (Diflucan) 150 MG tablet, Take 1 tablet by mouth Daily., Disp: 2 tablet, Rfl: 0  •  fluticasone (FLONASE) 50 MCG/ACT nasal spray, 2 sprays into the nostril(s) as directed by provider Daily., Disp: , Rfl:   •  Fluticasone-Umeclidin-Vilant 100-62.5-25 MCG/INH aerosol powder , Inhale 1 puff Every Morning., Disp: 28 each, Rfl: 5  •  gabapentin (NEURONTIN) 600 MG tablet, Take 1 tablet by mouth 3 (Three) Times a Day., Disp: 90 tablet, Rfl: 0  •  hydrOXYzine pamoate (VISTARIL) 25 MG capsule, Take 1 capsule by mouth 3 (Three) Times a Day As Needed for Anxiety., Disp: 90 capsule, Rfl: 5  •  lansoprazole (PREVACID) 30 MG capsule, Take 1 capsule by mouth 2 (Two) Times a Day., Disp: 60 capsule, Rfl: 5  •  loratadine (CLARITIN) 10 MG tablet, Take 1 tablet by mouth Daily As Needed for Allergies., Disp: 30 tablet, Rfl: 5  •  montelukast (SINGULAIR) 10 MG tablet, Take 1 tablet by mouth Daily., Disp: 30 tablet, Rfl: 5  •  ondansetron (ZOFRAN) 4 MG tablet, Take 1 tablet by mouth Every 8 (Eight) Hours As Needed for Nausea or Vomiting., Disp: 20 tablet, Rfl: 1  •  predniSONE (DELTASONE) 20 MG tablet, Take 1 tablet by mouth 2 (Two) Times a Day for 7 days., Disp: 14 tablet, Rfl: 0  •  promethazine-dextromethorphan (PROMETHAZINE-DM) 6.25-15 MG/5ML syrup, Take 5 mL by mouth 4 (Four) Times a Day As Needed for Cough., Disp: 180 mL, Rfl: 1    Allergies   Allergen Reactions   • Penicillins    • Percocet [Oxycodone-Acetaminophen]      Review of Systems   Constitutional: Positive for activity change. Negative for chills, fatigue and fever.   HENT: Positive for congestion and rhinorrhea. Negative for ear pain, sinus pressure, sinus pain and sore throat.         Loss of sense of smell/taste: negative   Respiratory: Positive for cough, shortness of breath and wheezing.         Chronic and stable   Cardiovascular: Negative for chest pain.   Gastrointestinal: Negative for diarrhea, nausea and vomiting.  "  Musculoskeletal: Negative for myalgias.   Skin: Negative for color change and rash.   Neurological: Negative for dizziness, light-headedness and headaches.   Psychiatric/Behavioral: Negative for sleep disturbance.     Visit Vitals  Temp 98.7 °F (37.1 °C)   Ht 161.3 cm (63.5\")   Wt 94.8 kg (209 lb)   BMI 36.44 kg/m²     Physical Exam  Constitutional:       General: She is not in acute distress.     Comments: Pleasant; speaking without a cough at this time   Pulmonary:      Effort: No respiratory distress.   Neurological:      Mental Status: She is alert.   Psychiatric:         Mood and Affect: Mood normal.         Speech: Speech normal.         Behavior: Behavior normal. Behavior is cooperative.         Thought Content: Thought content normal.         Cognition and Memory: Cognition normal.       Assessment/Plan   Diagnoses and all orders for this visit:    1. COPD mixed type (CMS/HCC) (Primary)    2. Chronic non-seasonal allergic rhinitis    Symptoms discussed. Ebony is to continue full course of Doxycycline and Prednisone. She is at her baseline and is fever free. Release to work note provided. Encouraged her to consider smoking cessation and COVID vaccination. S/S of concern reviewed and if occur to seek further medical evaluation.  This visit has been scheduled as a phone visit to comply with patient safety concerns in accordance with CDC recommendations. Total time of discussion was 12 minutes.     This document has been electronically signed by SIMON Arcos FNP-BC, CDE  January 15, 2021 12:16 EST               "

## 2021-02-05 DIAGNOSIS — M50.30 DDD (DEGENERATIVE DISC DISEASE), CERVICAL: ICD-10-CM

## 2021-02-05 RX ORDER — GABAPENTIN 600 MG/1
TABLET ORAL
Qty: 90 TABLET | Refills: 0 | Status: SHIPPED | OUTPATIENT
Start: 2021-02-05 | End: 2021-03-10

## 2021-02-12 ENCOUNTER — OFFICE VISIT (OUTPATIENT)
Dept: FAMILY MEDICINE CLINIC | Facility: CLINIC | Age: 51
End: 2021-02-12

## 2021-02-12 DIAGNOSIS — F41.9 CHRONIC ANXIETY: ICD-10-CM

## 2021-02-12 DIAGNOSIS — F17.200 SMOKER: ICD-10-CM

## 2021-02-12 DIAGNOSIS — M50.30 DDD (DEGENERATIVE DISC DISEASE), CERVICAL: ICD-10-CM

## 2021-02-12 DIAGNOSIS — M25.561 CHRONIC PAIN OF BOTH KNEES: ICD-10-CM

## 2021-02-12 DIAGNOSIS — E78.2 MIXED HYPERLIPIDEMIA: ICD-10-CM

## 2021-02-12 DIAGNOSIS — K21.9 GASTROESOPHAGEAL REFLUX DISEASE WITHOUT ESOPHAGITIS: ICD-10-CM

## 2021-02-12 DIAGNOSIS — J44.9 COPD MIXED TYPE (HCC): ICD-10-CM

## 2021-02-12 DIAGNOSIS — G89.29 CHRONIC PAIN OF BOTH KNEES: ICD-10-CM

## 2021-02-12 DIAGNOSIS — E66.01 MORBIDLY OBESE (HCC): ICD-10-CM

## 2021-02-12 DIAGNOSIS — M51.36 DDD (DEGENERATIVE DISC DISEASE), LUMBAR: ICD-10-CM

## 2021-02-12 DIAGNOSIS — M25.562 CHRONIC PAIN OF BOTH KNEES: ICD-10-CM

## 2021-02-12 DIAGNOSIS — J30.89 CHRONIC NON-SEASONAL ALLERGIC RHINITIS: Primary | ICD-10-CM

## 2021-02-12 DIAGNOSIS — Z00.00 HEALTHCARE MAINTENANCE: ICD-10-CM

## 2021-02-12 PROCEDURE — 99214 OFFICE O/P EST MOD 30 MIN: CPT | Performed by: GENERAL PRACTICE

## 2021-02-12 NOTE — PROGRESS NOTES
Deonte Acosta is a 50 y.o. female.     History of Present Illness     COPD  She complains of a persistent dry cough, shortness of breath with exertion, and intermittent wheezing.  She continues to deny any upper respiratory tract symptoms and there is no history of any chest pain, hemoptysis, fever, or chills. She reports that her symptoms become worse with activity and exposure to cold air. Her symptoms are reduced with rest and with inhaled inhaled medication.  She is currently on Trelegy and states she is using albuterol 2-3 times daily on average.  She has had multiple admissions to hospital for apparent exacerbations associated with pneumonia. She has repeatedly missed appointments for a CT of the chest, PFT and a pulmonology assessment.  She continues to smoke 1/2 pack/day.    GERD  Patient has a history of heartburn and bitter taste in mouth. Symptoms have been present for a number of years. The patient denies abdominal bloating, dysphagia, hematemesis, melena and unexpected weight loss. Symptoms appear to be worsened by large meals, lying down and fatty foods. Risk factors present for GERD include tobacco abuse, caffeine use and obesity. Risk factors absent for GERD are alcohol use and NSAID use. Studies performed so far include none. Treatments tried so far include proton pump inhibitor: lansoprazole. Results of treatment: good control of her symptoms provided she takes it twice daily    Bilateral Knee Pain  She continues to have knee pain worse on the right. This is described as a sharp anterior ache worse with weightbearing.  The pain does not radiate elsewhere but has been associated with intermittent swelling and a sense of giving way.  There is no history of any stiffness or locking.  She has yet to follow-up with the x-rays of her right knee that have been arranged    Neck Pain  Symptoms have been present for a number of years and are unchanged. The pain is located in the posterior neck  bilaterally and radiates to the left posterior shoulder. The pain is described as sharp and stiffness and occurs intermittently. She rates her pain as moderate. Symptoms are exacerbated by any movement and prolonged posture.  Within the few years she has had similar pain about her lower back.  Symptoms are improved by gentle exercise/stretches, heat and cyclobenzaprine and gabapentin. She has tingling about the left shoulder associated with the neck pain. She denies any tingling elsewhere and has had no weakness, numbness, or changes in her bowel/bladder control. MRI of the cervical spine performed on 6/29/15 was reported as showing DDD and OA.  She has yet to follow-up with the x-rays of her lumbar spine that have been arranged    Anxiety  She has a history of the following anxiety symptoms: nervousness, worrying, insomnia, fatigue, feelings of losing control. Onset of symptoms was a number of years ago.  Symptoms have been intermittent since then. She denies current suicidal and homicidal ideation.Treatment has included medication duloxetine, buspirone and hydroxyzine. She denies any apparent side effects    Dyslipidemia  Compliance with treatment has been good. The patient is quite active at her work as a . She is currently being prescribed the following medication for her dyslipidemia - atorvastatin, omega 3 fatty acids. Patient denies side effects associated with her medications.      The following portions of the patient's history were reviewed and updated as appropriate: allergies, current medications, past medical history, past social history and problem list.    Review of Systems   Constitutional: Positive for fatigue. Negative for appetite change, chills, fever and unexpected weight change.   HENT: Negative for congestion, ear pain, postnasal drip, rhinorrhea, sneezing and sore throat.    Eyes: Negative for itching and visual disturbance.   Respiratory: Positive for cough, shortness of breath and  wheezing.    Cardiovascular: Negative for chest pain, palpitations and leg swelling.   Gastrointestinal: Negative for abdominal pain, blood in stool, constipation, diarrhea, nausea and vomiting.        Occasional heartburn   Genitourinary: Negative for dysuria and hematuria.   Musculoskeletal: Positive for arthralgias, back pain and neck pain. Negative for joint swelling and myalgias.   Skin: Negative for rash.   Neurological: Positive for numbness (left lateral neck and posterior shoulder). Negative for weakness and headaches.   Psychiatric/Behavioral: Positive for sleep disturbance. Negative for dysphoric mood and suicidal ideas. The patient is nervous/anxious.      Objective   Physical Exam  Constitutional:       General: She is not in acute distress.     Appearance: Normal appearance. She is well-developed. She is not diaphoretic.      Comments: Appeared somewhat older than stated age.  Bright and in fair spirits. No apparent distress. No pallor, jaundice, diaphoresis, or cyanosis.     HENT:      Head: Atraumatic.      Right Ear: Tympanic membrane, ear canal and external ear normal.      Left Ear: Tympanic membrane, ear canal and external ear normal.   Eyes:      Conjunctiva/sclera: Conjunctivae normal.   Neck:      Thyroid: No thyroid mass or thyromegaly.      Vascular: No carotid bruit or JVD.      Trachea: Trachea normal. No tracheal deviation.   Cardiovascular:      Rate and Rhythm: Normal rate and regular rhythm.      Heart sounds: Normal heart sounds, S1 normal and S2 normal. No murmur. No gallop.    Pulmonary:      Effort: Pulmonary effort is normal.      Breath sounds: Examination of the right-lower field reveals decreased breath sounds. Examination of the left-lower field reveals decreased breath sounds. Decreased breath sounds and wheezing (diffuse - mild) present. No rales.      Comments: Pulmonary hyperinflation  Abdominal:      General: Bowel sounds are normal. There is no distension.    Musculoskeletal:      Right lower leg: No edema.      Left lower leg: No edema.      Comments: No peripheral joint redness or warmth.   Lymphadenopathy:      Head:      Right side of head: No submental, submandibular, tonsillar, preauricular, posterior auricular or occipital adenopathy.      Left side of head: No submental, submandibular, tonsillar, preauricular, posterior auricular or occipital adenopathy.      Cervical: No cervical adenopathy.      Upper Body:      Right upper body: No supraclavicular adenopathy.      Left upper body: No supraclavicular adenopathy.   Skin:     General: Skin is warm.      Coloration: Skin is not cyanotic, jaundiced or pale.      Findings: No rash.      Nails: There is no clubbing.     Neurological:      Mental Status: She is alert and oriented to person, place, and time.      Cranial Nerves: No cranial nerve deficit.      Motor: No tremor.      Coordination: Coordination normal.      Gait: Gait normal.   Psychiatric:         Speech: Speech normal.         Behavior: Behavior normal.         Thought Content: Thought content normal.       Assessment/Plan   Problems Addressed this Visit        Allergies and Adverse Reactions    Chronic non-seasonal allergic rhinitis        Cardiac and Vasculature    Mixed hyperlipidemia  Encouraged to continue to work on her diet and exercise plan.  Continue current medication  Scheduled for updated labs    Relevant Orders    Comprehensive Metabolic Panel    Lipid Panel    TSH       Endocrine and Metabolic    Morbidly obese (CMS/HCC)       Gastrointestinal Abdominal     Gastroesophageal reflux disease without esophagitis   Symptoms are currently well controlled.  Reminded regarding lifestyle modification.  Continue current medication.       Health Encounters    Healthcare maintenance  Encouraged to obtain a COVID-19 immunization when available.  Patient remains uninterested in any cancer screening at present    Relevant Orders    Vitamin D 25 Hydroxy        Mental Health    Chronic anxiety  Significant situational component.   Supportive therapy.   Continue current medication.    Relevant Orders    TSH       Musculoskeletal and Injuries    Chronic pain of both knees  Reminded regarding symptomatic treatment.   Continue current medication  Encouraged to report if any worse or if any new symptoms or concerns.       Neuro    DDD (degenerative disc disease), cervical  As above.    DDD (degenerative disc disease), lumbar  As above.       Pulmonary and Pneumonias    COPD mixed type (CMS/[facility])   COPD is stable.  Reminded of the importance of smoking cessation  Encouraged to remain as active as symptoms allow for  Continue current medication    Relevant Orders    CBC & Differential       Tobacco    Smoker      Diagnoses       Codes Comments    Chronic non-seasonal allergic rhinitis    -  Primary ICD-10-CM: J30.89  ICD-9-CM: 477.9     Mixed hyperlipidemia     ICD-10-CM: E78.2  ICD-9-CM: 272.2     Morbidly obese (CMS/[facility])     ICD-10-CM: E66.01  ICD-9-CM: 278.01     Gastroesophageal reflux disease without esophagitis     ICD-10-CM: K21.9  ICD-9-CM: 530.81     Healthcare maintenance     ICD-10-CM: Z00.00  ICD-9-CM: V70.0     Chronic anxiety     ICD-10-CM: F41.9  ICD-9-CM: 300.00     Chronic pain of both knees     ICD-10-CM: M25.561, M25.562, G89.29  ICD-9-CM: 719.46, 338.29     DDD (degenerative disc disease), lumbar     ICD-10-CM: M51.36  ICD-9-CM: 722.52     DDD (degenerative disc disease), cervical     ICD-10-CM: M50.30  ICD-9-CM: 722.4     COPD mixed type (CMS/HCC)     ICD-10-CM: J44.9  ICD-9-CM: 496     Smoker     ICD-10-CM: F17.200  ICD-9-CM: 305.1

## 2021-02-13 VITALS
HEIGHT: 64 IN | RESPIRATION RATE: 14 BRPM | SYSTOLIC BLOOD PRESSURE: 124 MMHG | WEIGHT: 221 LBS | DIASTOLIC BLOOD PRESSURE: 60 MMHG | HEART RATE: 81 BPM | OXYGEN SATURATION: 97 % | BODY MASS INDEX: 37.73 KG/M2 | TEMPERATURE: 97.1 F

## 2021-02-17 ENCOUNTER — OFFICE VISIT (OUTPATIENT)
Dept: FAMILY MEDICINE CLINIC | Facility: CLINIC | Age: 51
End: 2021-02-17

## 2021-02-17 VITALS — BODY MASS INDEX: 37.73 KG/M2 | WEIGHT: 221 LBS | HEIGHT: 64 IN

## 2021-02-17 DIAGNOSIS — M25.512 ACUTE PAIN OF LEFT SHOULDER: Primary | ICD-10-CM

## 2021-02-17 DIAGNOSIS — M54.2 CERVICALGIA: ICD-10-CM

## 2021-02-17 PROCEDURE — 99443 PR PHYS/QHP TELEPHONE EVALUATION 21-30 MIN: CPT | Performed by: NURSE PRACTITIONER

## 2021-02-17 RX ORDER — LIDOCAINE 50 MG/G
1 PATCH TOPICAL EVERY 24 HOURS
COMMUNITY
End: 2021-08-14

## 2021-02-17 RX ORDER — KETOROLAC TROMETHAMINE 10 MG/1
10 TABLET, FILM COATED ORAL EVERY 6 HOURS PRN
COMMUNITY
End: 2021-04-05

## 2021-02-17 RX ORDER — IBUPROFEN 800 MG/1
800 TABLET ORAL EVERY 8 HOURS PRN
Qty: 30 TABLET | Refills: 2 | Status: SHIPPED | OUTPATIENT
Start: 2021-02-17 | End: 2021-04-05
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
## 2021-02-23 ENCOUNTER — TELEPHONE (OUTPATIENT)
Dept: FAMILY MEDICINE CLINIC | Facility: CLINIC | Age: 51
End: 2021-02-23

## 2021-02-23 DIAGNOSIS — K21.9 GASTROESOPHAGEAL REFLUX DISEASE WITHOUT ESOPHAGITIS: Primary | ICD-10-CM

## 2021-02-23 RX ORDER — DEXLANSOPRAZOLE 60 MG/1
60 CAPSULE, DELAYED RELEASE ORAL DAILY
Qty: 30 CAPSULE | Refills: 5 | Status: SHIPPED | OUTPATIENT
Start: 2021-02-23 | End: 2021-08-14 | Stop reason: SDUPTHER

## 2021-02-23 NOTE — TELEPHONE ENCOUNTER
PATIENT CALLED AND REQUESTED A PRE AUTHORIZATION FOR :  lansoprazole (PREVACID) 30 MG capsule    HAS ENOUGH MEDICATION FOR A COUPLE OF DAYS    [facility] - [place], [place] - [address] [place] [address] - [phone] PH -    IF ANY QUESTIONS PLEASE CALL: [phone]

## 2021-03-04 ENCOUNTER — PRIOR AUTHORIZATION (OUTPATIENT)
Dept: FAMILY MEDICINE CLINIC | Facility: CLINIC | Age: 51
End: 2021-03-04

## 2021-03-09 DIAGNOSIS — M50.30 DDD (DEGENERATIVE DISC DISEASE), CERVICAL: ICD-10-CM

## 2021-03-09 NOTE — TELEPHONE ENCOUNTER
Caller: Ebony Acosta    Relationship: Self    Best call back number: [phone]    Medication needed:   Requested Prescriptions     Pending Prescriptions Disp Refills   • gabapentin (NEURONTIN) 600 MG tablet [Pharmacy Med Name: GABAPENTIN 600 MG TABS 600 Tablet] 90 tablet 0     Sig: TAKE ONE TABLET BY MOUTH THREE TIMES A DAY       When do you need the refill by: ASAP    What details did the patient provide when requesting the medication: PATIENT RAN OUT 03/06  Does the patient have less than a 3 day supply:  [x] Yes  [] No    What is the patient's preferred pharmacy: [facility] [place], [place] - [address] [facility] [phone] [facility] [phone] FX

## 2021-03-10 RX ORDER — GABAPENTIN 600 MG/1
TABLET ORAL
Qty: 90 TABLET | Refills: 2 | Status: SHIPPED | OUTPATIENT
Start: 2021-03-10 | End: 2021-06-04

## 2021-04-05 DIAGNOSIS — F41.9 ANXIETY: ICD-10-CM

## 2021-04-05 DIAGNOSIS — J44.9 CHRONIC OBSTRUCTIVE PULMONARY DISEASE, UNSPECIFIED COPD TYPE (HCC): ICD-10-CM

## 2021-04-05 DIAGNOSIS — M50.30 DDD (DEGENERATIVE DISC DISEASE), CERVICAL: ICD-10-CM

## 2021-04-05 DIAGNOSIS — M25.512 ACUTE PAIN OF LEFT SHOULDER: ICD-10-CM

## 2021-04-05 DIAGNOSIS — J44.9 COPD MIXED TYPE (HCC): ICD-10-CM

## 2021-04-05 DIAGNOSIS — R11.0 NAUSEA: ICD-10-CM

## 2021-04-05 DIAGNOSIS — E78.2 MIXED HYPERLIPIDEMIA: ICD-10-CM

## 2021-04-05 RX ORDER — BUSPIRONE HYDROCHLORIDE 10 MG/1
TABLET ORAL
Qty: 90 TABLET | Refills: 5 | Status: SHIPPED | OUTPATIENT
Start: 2021-04-05 | End: 2021-08-14 | Stop reason: SDUPTHER

## 2021-04-05 RX ORDER — ONDANSETRON 4 MG/1
TABLET, FILM COATED ORAL
Qty: 20 TABLET | Refills: 1 | Status: SHIPPED | OUTPATIENT
Start: 2021-04-05 | End: 2021-08-14

## 2021-04-05 RX ORDER — ALBUTEROL SULFATE 90 UG/1
AEROSOL, METERED RESPIRATORY (INHALATION)
Qty: 36 G | Refills: 5 | Status: SHIPPED | OUTPATIENT
Start: 2021-04-05 | End: 2021-08-14 | Stop reason: SDUPTHER

## 2021-04-05 RX ORDER — MONTELUKAST SODIUM 10 MG/1
TABLET ORAL
Qty: 30 TABLET | Refills: 5 | Status: SHIPPED | OUTPATIENT
Start: 2021-04-05 | End: 2021-08-14 | Stop reason: SDUPTHER

## 2021-04-05 RX ORDER — HYDROXYZINE PAMOATE 25 MG/1
CAPSULE ORAL
Qty: 90 CAPSULE | Refills: 5 | Status: SHIPPED | OUTPATIENT
Start: 2021-04-05 | End: 2021-08-14 | Stop reason: SDUPTHER

## 2021-04-05 RX ORDER — ATORVASTATIN CALCIUM 40 MG/1
TABLET, FILM COATED ORAL
Qty: 30 TABLET | Refills: 5 | Status: SHIPPED | OUTPATIENT
Start: 2021-04-05 | End: 2021-08-14 | Stop reason: SDUPTHER

## 2021-04-05 RX ORDER — IBUPROFEN 800 MG/1
TABLET ORAL
Qty: 30 TABLET | Refills: 2 | Status: SHIPPED | OUTPATIENT
Start: 2021-04-05 | End: 2021-08-14 | Stop reason: SDUPTHER

## 2021-04-05 RX ORDER — CYCLOBENZAPRINE HCL 10 MG
TABLET ORAL
Qty: 90 TABLET | Refills: 5 | Status: SHIPPED | OUTPATIENT
Start: 2021-04-05 | End: 2021-08-14 | Stop reason: SDUPTHER

## 2021-06-04 DIAGNOSIS — M50.30 DDD (DEGENERATIVE DISC DISEASE), CERVICAL: ICD-10-CM

## 2021-06-04 RX ORDER — GABAPENTIN 600 MG/1
TABLET ORAL
Qty: 90 TABLET | Refills: 0 | Status: SHIPPED | OUTPATIENT
Start: 2021-06-04 | End: 2021-07-06

## 2021-06-22 ENCOUNTER — OFFICE VISIT (OUTPATIENT)
Dept: FAMILY MEDICINE CLINIC | Facility: CLINIC | Age: 51
End: 2021-06-22

## 2021-06-22 VITALS
RESPIRATION RATE: 14 BRPM | TEMPERATURE: 96.6 F | HEART RATE: 87 BPM | SYSTOLIC BLOOD PRESSURE: 130 MMHG | BODY MASS INDEX: 36.37 KG/M2 | HEIGHT: 64 IN | OXYGEN SATURATION: 97 % | WEIGHT: 213 LBS | DIASTOLIC BLOOD PRESSURE: 80 MMHG

## 2021-06-22 DIAGNOSIS — E78.2 MIXED HYPERLIPIDEMIA: ICD-10-CM

## 2021-06-22 DIAGNOSIS — Z00.00 HEALTHCARE MAINTENANCE: ICD-10-CM

## 2021-06-22 DIAGNOSIS — F41.9 CHRONIC ANXIETY: ICD-10-CM

## 2021-06-22 DIAGNOSIS — J44.9 COPD MIXED TYPE (HCC): ICD-10-CM

## 2021-06-22 DIAGNOSIS — M79.2 RADICULAR PAIN IN RIGHT ARM: Primary | ICD-10-CM

## 2021-06-22 LAB
25(OH)D3 SERPL-MCNC: 13.2 NG/ML
ALBUMIN SERPL-MCNC: 4 G/DL (ref 3.5–5.2)
ALBUMIN/GLOB SERPL: 1.4 G/DL
ALP SERPL-CCNC: 126 U/L (ref 39–117)
ALT SERPL W P-5'-P-CCNC: 12 U/L (ref 1–33)
ANION GAP SERPL CALCULATED.3IONS-SCNC: 9.4 MMOL/L (ref 5–15)
AST SERPL-CCNC: 10 U/L (ref 1–32)
BASOPHILS # BLD AUTO: 0.03 10*3/MM3 (ref 0–0.2)
BASOPHILS NFR BLD AUTO: 0.4 % (ref 0–1.5)
BILIRUB SERPL-MCNC: 0.3 MG/DL (ref 0–1.2)
BUN SERPL-MCNC: 15 MG/DL (ref 6–20)
BUN/CREAT SERPL: 17.2 (ref 7–25)
CALCIUM SPEC-SCNC: 9 MG/DL (ref 8.6–10.5)
CHLORIDE SERPL-SCNC: 104 MMOL/L (ref 98–107)
CHOLEST SERPL-MCNC: 180 MG/DL (ref 0–200)
CO2 SERPL-SCNC: 25.6 MMOL/L (ref 22–29)
CREAT SERPL-MCNC: 0.87 MG/DL (ref 0.57–1)
DEPRECATED RDW RBC AUTO: 47.1 FL (ref 37–54)
EOSINOPHIL # BLD AUTO: 0.14 10*3/MM3 (ref 0–0.4)
EOSINOPHIL NFR BLD AUTO: 1.8 % (ref 0.3–6.2)
ERYTHROCYTE [DISTWIDTH] IN BLOOD BY AUTOMATED COUNT: 14.6 % (ref 12.3–15.4)
GFR SERPL CREATININE-BSD FRML MDRD: 68 ML/MIN/1.73
GLOBULIN UR ELPH-MCNC: 2.8 GM/DL
GLUCOSE SERPL-MCNC: 94 MG/DL (ref 65–99)
HCT VFR BLD AUTO: 45.6 % (ref 34–46.6)
HDLC SERPL-MCNC: 33 MG/DL (ref 40–60)
HGB BLD-MCNC: 15 G/DL (ref 12–15.9)
IMM GRANULOCYTES # BLD AUTO: 0.02 10*3/MM3 (ref 0–0.05)
IMM GRANULOCYTES NFR BLD AUTO: 0.3 % (ref 0–0.5)
LDLC SERPL CALC-MCNC: 115 MG/DL (ref 0–100)
LDLC/HDLC SERPL: 3.38 {RATIO}
LYMPHOCYTES # BLD AUTO: 2.31 10*3/MM3 (ref 0.7–3.1)
LYMPHOCYTES NFR BLD AUTO: 30.1 % (ref 19.6–45.3)
MCH RBC QN AUTO: 28.7 PG (ref 26.6–33)
MCHC RBC AUTO-ENTMCNC: 32.9 G/DL (ref 31.5–35.7)
MCV RBC AUTO: 87.4 FL (ref 79–97)
MONOCYTES # BLD AUTO: 0.46 10*3/MM3 (ref 0.1–0.9)
MONOCYTES NFR BLD AUTO: 6 % (ref 5–12)
NEUTROPHILS NFR BLD AUTO: 4.72 10*3/MM3 (ref 1.7–7)
NEUTROPHILS NFR BLD AUTO: 61.4 % (ref 42.7–76)
NRBC BLD AUTO-RTO: 0 /100 WBC (ref 0–0.2)
PLATELET # BLD AUTO: 340 10*3/MM3 (ref 140–450)
PMV BLD AUTO: 10.5 FL (ref 6–12)
POTASSIUM SERPL-SCNC: 3.9 MMOL/L (ref 3.5–5.2)
PROT SERPL-MCNC: 6.8 G/DL (ref 6–8.5)
RBC # BLD AUTO: 5.22 10*6/MM3 (ref 3.77–5.28)
SODIUM SERPL-SCNC: 139 MMOL/L (ref 136–145)
TRIGL SERPL-MCNC: 178 MG/DL (ref 0–150)
TSH SERPL DL<=0.05 MIU/L-ACNC: 1.81 UIU/ML (ref 0.27–4.2)
VLDLC SERPL-MCNC: 32 MG/DL (ref 5–40)
WBC # BLD AUTO: 7.68 10*3/MM3 (ref 3.4–10.8)

## 2021-06-22 PROCEDURE — 80061 LIPID PANEL: CPT | Performed by: GENERAL PRACTICE

## 2021-06-22 PROCEDURE — 99214 OFFICE O/P EST MOD 30 MIN: CPT | Performed by: NURSE PRACTITIONER

## 2021-06-22 PROCEDURE — 82306 VITAMIN D 25 HYDROXY: CPT | Performed by: GENERAL PRACTICE

## 2021-06-22 PROCEDURE — 96372 THER/PROPH/DIAG INJ SC/IM: CPT | Performed by: NURSE PRACTITIONER

## 2021-06-22 PROCEDURE — 80050 GENERAL HEALTH PANEL: CPT | Performed by: GENERAL PRACTICE

## 2021-06-22 RX ORDER — METHYLPREDNISOLONE ACETATE 80 MG/ML
80 INJECTION, SUSPENSION INTRA-ARTICULAR; INTRALESIONAL; INTRAMUSCULAR; SOFT TISSUE ONCE
Status: COMPLETED | OUTPATIENT
Start: 2021-06-22 | End: 2021-06-22

## 2021-06-22 RX ORDER — TRAMADOL HYDROCHLORIDE 50 MG/1
50 TABLET ORAL EVERY 8 HOURS PRN
Qty: 20 TABLET | Refills: 0 | Status: SHIPPED | OUTPATIENT
Start: 2021-06-22 | End: 2021-08-14

## 2021-06-22 RX ORDER — TRAMADOL HYDROCHLORIDE 50 MG/1
50 TABLET ORAL EVERY 8 HOURS PRN
Qty: 20 TABLET | Refills: 0 | Status: SHIPPED | OUTPATIENT
Start: 2021-06-22 | End: 2021-06-22 | Stop reason: SDUPTHER

## 2021-06-22 RX ADMIN — METHYLPREDNISOLONE ACETATE 80 MG: 80 INJECTION, SUSPENSION INTRA-ARTICULAR; INTRALESIONAL; INTRAMUSCULAR; SOFT TISSUE at 09:18

## 2021-06-22 NOTE — PROGRESS NOTES
History of Present Illness     Ebony Acosta is a 50 y.o. female who presents to the clinic today c/o right upper arm pain which radiates to her right hand. Movements which worsen the pain includes lifting, overhead movement, external rotation. She is a dominant right handed individual. In addition, she is due for labs that Dr Parekh ordered and will have those done today. She has been diagnosed with  COPD, Hyperlipidemia and Chronic Pain. Ebony is having Physical Therapy with Dr [facility] in [place] and scheduled again in four days.     Arm Pain   The incident occurred more than 1 week ago. The incident occurred at work. There was no injury mechanism. The pain is present in the upper right arm, right forearm, right fingers and right hand. The quality of the pain is described as aching. The pain radiates to the right hand. The pain is at a severity of 8/10. Pain severity now: Moderate to severe. The pain has been worsening since the incident. Associated symptoms include muscle weakness, numbness and tingling. Pertinent negatives include no chest pain. The symptoms are aggravated by lifting. She has tried  Flexeril, Gabapentin and tylenol  for the symptoms. The treatment provided mild relief.     COPD  There is intermittent cough, shortness of breath and  wheezing. Taking Trelegy, Singulair, and Albuterol HFA     Hyperlipidemia  Prescribed Atorvastatin which she tolerates well.     The following portions of the patient's history were reviewed and updated as appropriate: allergies, current medications, past family history, past medical history, past social history, past surgical history and problem list.    Current Outpatient Medications:   •  acetaminophen (ACETAMINOPHEN EXTRA STRENGTH) 500 MG tablet, Take 2 tablets by mouth Every 6 (Six) Hours As Needed for Mild Pain ., Disp: 180 tablet, Rfl: 5  •  albuterol (PROVENTIL) (2.5 MG/3ML) 0.083% nebulizer solution, Take 2.5 mg by nebulization Every 4 (Four)  Hours As Needed for Wheezing., Disp: 180 vial, Rfl: 5  •  albuterol sulfate  (90 Base) MCG/ACT inhaler, INHALE 2 PUFFS BY MOUTH EVERY 3 HOURS AS NEEDED FOR WHEEZING., Disp: 36 g, Rfl: 5  •  aspirin (Aspirin Adult Low Dose) 81 MG EC tablet, Take 1 tablet by mouth Daily., Disp: 30 tablet, Rfl: 5  •  atorvastatin (LIPITOR) 40 MG tablet, TAKE ONE TABLET BY MOUTH AT BEDTIME, Disp: 30 tablet, Rfl: 5  •  busPIRone (BUSPAR) 10 MG tablet, TAKE ONE TABLET BY MOUTH THREE TIMES A DAY, Disp: 90 tablet, Rfl: 5  •  cyclobenzaprine (FLEXERIL) 10 MG tablet, TAKE ONE TABLET BY MOUTH THREE TIMES A DAY, Disp: 90 tablet, Rfl: 5  •  dexlansoprazole (DEXILANT) 60 MG capsule, Take 1 capsule by mouth Daily., Disp: 30 capsule, Rfl: 5  •  DULoxetine (CYMBALTA) 30 MG capsule, Take 1 capsule by mouth Daily., Disp: 30 capsule, Rfl: 5  •  fluconazole (Diflucan) 150 MG tablet, Take 1 tablet by mouth Daily., Disp: 2 tablet, Rfl: 0  •  fluticasone (FLONASE) 50 MCG/ACT nasal spray, 2 sprays into the nostril(s) as directed by provider Daily., Disp: , Rfl:   •  gabapentin (NEURONTIN) 600 MG tablet, TAKE ONE TABLET BY MOUTH THREE TIMES A DAY, Disp: 90 tablet, Rfl: 0  •  hydrOXYzine pamoate (VISTARIL) 25 MG capsule, TAKE ONE CAPSULE BY MOUTH THREE TIMES A DAY AS NEEDED FOR ANXIETY, Disp: 90 capsule, Rfl: 5  •  ibuprofen (ADVIL,MOTRIN) 800 MG tablet, TAKE ONE TABLET BY MOUTH EVERY 8 HOURS AS NEEDED FOR MILD OR MODERATE PAIN. TAKE WITH FOOD, Disp: 30 tablet, Rfl: 2  •  lidocaine (LIDODERM) 5 %, Place 1 patch on the skin as directed by provider Daily. Remove & Discard patch within 12 hours or as directed by MD, Disp: , Rfl:   •  loratadine (CLARITIN) 10 MG tablet, Take 1 tablet by mouth Daily As Needed for Allergies., Disp: 30 tablet, Rfl: 5  •  montelukast (SINGULAIR) 10 MG tablet, TAKE ONE TABLET BY MOUTH EVERY DAY, Disp: 30 tablet, Rfl: 5  •  ondansetron (ZOFRAN) 4 MG tablet, TAKE 1 TABLET BY MOUTH EVERY 8 HOURS AS NEEDED FOR NAUSEA OR VOMITING,  "Disp: 20 tablet, Rfl: 1  •  promethazine-dextromethorphan (PROMETHAZINE-DM) 6.25-15 MG/5ML syrup, Take 5 mL by mouth 4 (Four) Times a Day As Needed for Cough., Disp: 180 mL, Rfl: 1  •  Trelegy Ellipta 100-62.5-25 MCG/INH aerosol powder , INHALE 1 PUFF BY MOUTH EVERY MORNING, Disp: 60 each, Rfl: 5  •  traMADol (ULTRAM) 50 MG tablet, Take 1 tablet by mouth Every 8 (Eight) Hours As Needed for Moderate Pain  or Severe Pain ., Disp: 20 tablet, Rfl: 0  No current facility-administered medications for this visit.    Allergies   Allergen Reactions   • Penicillins    • Percocet [Oxycodone-Acetaminophen]      Review of Systems   Constitutional: Positive for activity change. Negative for appetite change, chills, fatigue, fever and unexpected weight change.   HENT: Negative.    Respiratory: Negative for cough, shortness of breath and wheezing.    Cardiovascular: Negative for chest pain, palpitations and leg swelling.   Gastrointestinal: Negative for constipation, diarrhea, nausea and vomiting.   Endocrine: Negative for cold intolerance, heat intolerance, polydipsia, polyphagia and polyuria.   Musculoskeletal: Positive for arthralgias, back pain and neck pain.        Diagnosed with DDD of cervical spine   Skin: Negative for color change and rash.   Neurological: Positive for tingling and numbness. Negative for dizziness, tremors, speech difficulty, weakness, light-headedness and headaches.   Hematological: Negative for adenopathy.   Psychiatric/Behavioral: Positive for sleep disturbance (Due to pain). Negative for confusion and decreased concentration. The patient is not nervous/anxious.    All other systems reviewed and are negative.    Visit Vitals  /80 (BP Location: Left arm, Patient Position: Sitting, Cuff Size: Adult)   Pulse 87   Temp 96.6 °F (35.9 °C) (Temporal)   Resp 14   Ht 161.3 cm (63.5\")   Wt 96.6 kg (213 lb)   SpO2 97%   BMI 37.14 kg/m²     Physical Exam  Vitals and nursing note reviewed.   Constitutional:  "      General: She is not in acute distress.     Appearance: She is well-developed.      Comments: Pleasant;    HENT:      Head: Normocephalic.      Nose: Nose normal.   Eyes:      General: No scleral icterus.        Right eye: No discharge.         Left eye: No discharge.      Conjunctiva/sclera: Conjunctivae normal.   Neck:      Thyroid: No thyromegaly.      Vascular: No JVD.   Cardiovascular:      Rate and Rhythm: Normal rate and regular rhythm.      Heart sounds: Normal heart sounds. No murmur heard.   No friction rub.   Pulmonary:      Effort: Pulmonary effort is normal. No respiratory distress.      Breath sounds: Normal breath sounds. No wheezing or rales.   Abdominal:      General: There is no distension.      Palpations: Abdomen is soft.      Tenderness: There is no abdominal tenderness. There is no guarding.   Musculoskeletal:         General: Tenderness present.      Cervical back: Neck supple.      Right lower leg: No edema.      Left lower leg: No edema.      Comments: Point of tenderness which then radiates along right arm to fingers. Decreased ROM and strength    Lymphadenopathy:      Cervical: No cervical adenopathy.   Skin:     General: Skin is warm and dry.      Capillary Refill: Capillary refill takes less than 2 seconds.   Neurological:      Mental Status: She is alert and oriented to person, place, and time.      Cranial Nerves: Cranial nerves are intact.      Motor: Motor function is intact.      Gait: Gait is intact.   Psychiatric:         Mood and Affect: Mood and affect normal.         Speech: Speech normal.         Behavior: Behavior is cooperative.         Thought Content: Thought content normal.         Cognition and Memory: Cognition normal.       Assessment/Plan   Diagnoses and all orders for this visit:    1. Radicular pain in right arm (Primary)  -     methylPREDNISolone acetate (DEPO-medrol) injection 80 mg  -     Discontinue: traMADol (ULTRAM) 50 MG tablet; Take 1 tablet by mouth  Every 8 (Eight) Hours As Needed for Moderate Pain  or Severe Pain .  Dispense: 20 tablet; Refill: 0  -     traMADol (ULTRAM) 50 MG tablet; Take 1 tablet by mouth Every 8 (Eight) Hours As Needed for Moderate Pain  or Severe Pain .  Dispense: 20 tablet; Refill: 0    2. COPD mixed type (CMS/HCC)  -     CBC & Differential    3. Mixed hyperlipidemia  -     Comprehensive Metabolic Panel  -     Lipid Panel  -     TSH    4. Chronic anxiety  -     TSH    5. Healthcare maintenance  -     Vitamin D 25 Hydroxy    Findings and recommendations discussed with Ebony. Reviewed treatment options. Counseled regarding supportive care measures. Prescription written for her current Physical Therapist to evaluate and treat her right arm  Lifestyle modifications reinforced including nutrition and activity recommendations.S/S of concern reviewed and if occur to seek further medical evaluation or if symptoms worsen. She will follow up with Dr Parekh in July  sooner if problems/concerns occur.    As part of the patient's treatment plan they are being prescribed a controlled substance/ substances with potential for abuse.  This patient has been made aware of the appropriate use of such medications, including potential risk of somnolence, limited ability to drive and/or work safely, and potential for overdose.  It has also been made clear these medications are for use by the patient only, without concomitant use of alcohol or other substances unless prescribed/advised by medical provider.    Patient has completed prescribing agreement detailing terms of continued prescribing of controlled substances including monitoring TRA reports, urine drug screens, and pill counts.  The patient is aware TRA reports are reviewed on a regular basis and scanned into the chart.    History and physical exam exhibit safe and appropriate use of controlled substances for short durance to improve her quality of life.     [facility] Patient Controlled Substance  Report (from 6/22/2020 to 6/22/2021)    Dispensed  Strength Quantity Days Supply Provider Pharmacy   06/07/2021 Gabapentin 600MG 90 each 30 [facility]   05/06/2021 Gabapentin 600MG 90 each 30 [facility]   04/08/2021 Gabapentin 600MG 90 each 30 [facility]   03/11/2021 Gabapentin 600MG 90 each 30 [facility]   02/05/2021 Gabapentin 600MG 90 each 30 [facility]   01/04/2021 Gabapentin 600MG 90 each 30 [facility]   12/04/2020 Gabapentin 600MG 90 each 30 [facility]   11/06/2020 Gabapentin 600MG 90 each 30 [facility]   10/05/2020 Gabapentin 600MG 90 each 30 [facility]   09/08/2020 Gabapentin 600MG 90 each 30 [facility]   08/05/2020 Gabapentin 600MG 90 each 30 [facility]   07/07/2020 Gabapentin 600MG 90 each 30 [facility]                This document has been electronically signed by SIMON Arcos, JOCE-BC, CDE  June 22, 2021 11:33 EDT

## 2021-06-23 RX ORDER — ERGOCALCIFEROL 1.25 MG/1
50000 CAPSULE ORAL WEEKLY
Qty: 4 CAPSULE | Refills: 5 | Status: SHIPPED | OUTPATIENT
Start: 2021-06-23 | End: 2021-08-14

## 2021-07-06 DIAGNOSIS — M50.30 DDD (DEGENERATIVE DISC DISEASE), CERVICAL: ICD-10-CM

## 2021-07-06 RX ORDER — GABAPENTIN 600 MG/1
TABLET ORAL
Qty: 90 TABLET | Refills: 0 | Status: SHIPPED | OUTPATIENT
Start: 2021-07-06 | End: 2021-08-09

## 2021-07-07 DIAGNOSIS — E78.2 MIXED HYPERLIPIDEMIA: ICD-10-CM

## 2021-07-08 RX ORDER — ASPIRIN 81 MG/1
TABLET ORAL
Qty: 30 TABLET | Refills: 5 | Status: SHIPPED | OUTPATIENT
Start: 2021-07-08 | End: 2021-08-14 | Stop reason: SDUPTHER

## 2021-08-09 DIAGNOSIS — M50.30 DDD (DEGENERATIVE DISC DISEASE), CERVICAL: ICD-10-CM

## 2021-08-09 RX ORDER — GABAPENTIN 600 MG/1
TABLET ORAL
Qty: 90 TABLET | Refills: 0 | Status: SHIPPED | OUTPATIENT
Start: 2021-08-09 | End: 2021-08-14 | Stop reason: SDUPTHER

## 2021-08-13 ENCOUNTER — OFFICE VISIT (OUTPATIENT)
Dept: FAMILY MEDICINE CLINIC | Facility: CLINIC | Age: 51
End: 2021-08-13

## 2021-08-13 DIAGNOSIS — M25.561 CHRONIC PAIN OF BOTH KNEES: ICD-10-CM

## 2021-08-13 DIAGNOSIS — E55.9 VITAMIN D DEFICIENCY: ICD-10-CM

## 2021-08-13 DIAGNOSIS — J44.1 COPD WITH ACUTE EXACERBATION (HCC): Chronic | ICD-10-CM

## 2021-08-13 DIAGNOSIS — E78.2 MIXED HYPERLIPIDEMIA: ICD-10-CM

## 2021-08-13 DIAGNOSIS — Z00.00 HEALTHCARE MAINTENANCE: ICD-10-CM

## 2021-08-13 DIAGNOSIS — E66.01 MORBIDLY OBESE (HCC): ICD-10-CM

## 2021-08-13 DIAGNOSIS — M51.36 DDD (DEGENERATIVE DISC DISEASE), LUMBAR: ICD-10-CM

## 2021-08-13 DIAGNOSIS — M25.562 CHRONIC PAIN OF BOTH KNEES: ICD-10-CM

## 2021-08-13 DIAGNOSIS — M50.30 DDD (DEGENERATIVE DISC DISEASE), CERVICAL: ICD-10-CM

## 2021-08-13 DIAGNOSIS — M25.512 ACUTE PAIN OF LEFT SHOULDER: ICD-10-CM

## 2021-08-13 DIAGNOSIS — F41.9 CHRONIC ANXIETY: ICD-10-CM

## 2021-08-13 DIAGNOSIS — F17.200 SMOKER: ICD-10-CM

## 2021-08-13 DIAGNOSIS — G89.29 CHRONIC PAIN OF BOTH KNEES: ICD-10-CM

## 2021-08-13 DIAGNOSIS — J44.9 CHRONIC OBSTRUCTIVE PULMONARY DISEASE, UNSPECIFIED COPD TYPE (HCC): ICD-10-CM

## 2021-08-13 DIAGNOSIS — F41.9 ANXIETY: ICD-10-CM

## 2021-08-13 DIAGNOSIS — J44.9 COPD MIXED TYPE (HCC): ICD-10-CM

## 2021-08-13 DIAGNOSIS — K21.9 GASTROESOPHAGEAL REFLUX DISEASE WITHOUT ESOPHAGITIS: ICD-10-CM

## 2021-08-13 DIAGNOSIS — J30.89 CHRONIC NON-SEASONAL ALLERGIC RHINITIS: Primary | ICD-10-CM

## 2021-08-13 PROCEDURE — 99214 OFFICE O/P EST MOD 30 MIN: CPT | Performed by: GENERAL PRACTICE

## 2021-08-13 NOTE — PROGRESS NOTES
Deonte Acosta is a 51 y.o. female.     Chief Complaint  She returns for a scheduled reassessment of multiple medical problems including COPD, GERD, dyslipidemia, and chronic anxiety    History of Present Illness     COPD  She complains of a persistent dry cough, shortness of breath with exertion, and intermittent wheezing.  She continues to deny any upper respiratory tract symptoms and there is no history of any chest pain, hemoptysis, fever, or chills. She remains on trelegy and states she is using albuterol 2-3 times daily on average.  She has had multiple admissions to hospital in the past for exacerbations associated with pneumonia but has done well over the last few years.  She has been working full-time and with this has been getting more exercise and smoking less.  He is currently averaging 1/2 pack/day.  She has decided against lung cancer screening    GERD  She has a long history of intermittent heartburn and bitter taste in mouth.  There is no history of any abdominal bloating, dysphagia, hematemesis, melena or unexpected weight loss. Symptoms appear to be worsened by large meals, lying down and fatty foods. Risk factors present for GERD include tobacco abuse, caffeine use and obesity. Risk factors absent for GERD are alcohol use and NSAID use. Studies performed so far include none. Treatments tried so far include proton pump inhibitor: dexlansoprazole. Results of treatment: good control of her symptoms  Lab Results   Component Value Date    WBC 7.68 06/22/2021    HGB 15.0 06/22/2021    HCT 45.6 06/22/2021    MCV 87.4 06/22/2021     06/22/2021     Bilateral Knee Pain  She continues to have knee pain worse on the right. This is described as a sharp anterior ache worse with weightbearing.  The pain does not radiate elsewhere but has been associated with intermittent swelling and a sense of giving way.  There is no history of any stiffness or locking.  She has yet to follow-up with the  x-rays of her right knee that have been arranged    Neck Pain  Symptoms have been present for a number of years and are unchanged. The pain is located in the posterior neck bilaterally and radiates to the left posterior shoulder. The pain is described as sharp and stiffness and occurs intermittently. She rates her pain as moderate. Symptoms are exacerbated by any movement and prolonged posture.  Within the few years she has had similar pain about her lower back.  Symptoms are improved by gentle exercise/stretches, heat and cyclobenzaprine and gabapentin. She has tingling about the left shoulder associated with the neck pain. She denies any tingling elsewhere and has had no weakness, numbness, or changes in her bowel/bladder control. MRI of the cervical spine performed on 6/29/15 was reported as showing DDD and OA.  She has yet to follow-up with the x-rays of her lumbar spine that have been arranged    Anxiety  She has a long history of intermittent nervousness, worrying, insomnia, fatigue, and feelings of losing control. She denies any suicidal ideation.Treatment has included medication duloxetine, buspirone and hydroxyzine. She denies any apparent side effects  Lab Results   Component Value Date    TSH 1.810 06/22/2021     Dyslipidemia  Compliance with treatment has been good. The patient is quite active at her work as a . She is currently being prescribed the following medication for her dyslipidemia - atorvastatin, omega 3 fatty acids. Patient denies side effects associated with her medications.    Lab Results   Component Value Date    CHOL 180 06/22/2021    TRIG 178 (H) 06/22/2021    HDL 33 (L) 06/22/2021     (H) 06/22/2021     Lab Results   Component Value Date    GLUCOSE 94 06/22/2021    BUN 15 06/22/2021    CREATININE 0.87 06/22/2021    EGFRIFNONA 68 06/22/2021    BCR 17.2 06/22/2021    K 3.9 06/22/2021    CO2 25.6 06/22/2021    CALCIUM 9.0 06/22/2021    ALBUMIN 4.00 06/22/2021    AST 10  06/22/2021    ALT 12 06/22/2021     Labs  Most recent vitamin D 13.2.  Started on high-dose supplementation afterward    The following portions of the patient's history were reviewed and updated as appropriate: allergies, current medications, past medical history, past social history and problem list.    Review of Systems   Constitutional: Positive for fatigue. Negative for appetite change, chills, fever and unexpected weight change.   HENT: Negative for congestion, ear pain, postnasal drip, rhinorrhea, sneezing and sore throat.    Eyes: Negative for itching and visual disturbance.   Respiratory: Positive for cough, shortness of breath and wheezing.    Cardiovascular: Negative for chest pain, palpitations and leg swelling.   Gastrointestinal: Negative for abdominal pain, blood in stool, constipation, diarrhea, nausea and vomiting.        Occasional heartburn   Genitourinary: Negative for dysuria and hematuria.   Musculoskeletal: Positive for arthralgias, back pain and neck pain. Negative for joint swelling and myalgias.   Skin: Negative for rash.   Neurological: Positive for numbness (bilateral neck and posterior shoulders worse on the left). Negative for weakness and headaches.   Psychiatric/Behavioral: Positive for sleep disturbance. Negative for dysphoric mood and suicidal ideas. The patient is nervous/anxious.      Objective   Physical Exam  Constitutional:       General: She is not in acute distress.     Appearance: Normal appearance. She is well-developed. She is not diaphoretic.      Comments: Appeared somewhat older than stated age.  Bright and in fair spirits. No apparent distress. No pallor, jaundice, diaphoresis, or cyanosis.     HENT:      Head: Atraumatic.      Right Ear: Tympanic membrane, ear canal and external ear normal.      Left Ear: Tympanic membrane, ear canal and external ear normal.   Eyes:      Conjunctiva/sclera: Conjunctivae normal.   Neck:      Thyroid: No thyroid mass or thyromegaly.       Vascular: No carotid bruit or JVD.      Trachea: Trachea normal. No tracheal deviation.   Cardiovascular:      Rate and Rhythm: Normal rate and regular rhythm.      Heart sounds: Normal heart sounds, S1 normal and S2 normal. No murmur heard.   No gallop.    Pulmonary:      Effort: Pulmonary effort is normal.      Breath sounds: Examination of the right-lower field reveals decreased breath sounds. Examination of the left-lower field reveals decreased breath sounds. Decreased breath sounds and wheezing (diffuse - mild) present. No rales.      Comments: Pulmonary hyperinflation  Abdominal:      General: Bowel sounds are normal. There is no distension.   Musculoskeletal:      Right lower leg: No edema.      Left lower leg: No edema.      Comments: No peripheral joint redness or warmth.   Lymphadenopathy:      Head:      Right side of head: No submental, submandibular, tonsillar, preauricular, posterior auricular or occipital adenopathy.      Left side of head: No submental, submandibular, tonsillar, preauricular, posterior auricular or occipital adenopathy.      Cervical: No cervical adenopathy.      Upper Body:      Right upper body: No supraclavicular adenopathy.      Left upper body: No supraclavicular adenopathy.   Skin:     General: Skin is warm.      Coloration: Skin is not cyanotic, jaundiced or pale.      Findings: No rash.      Nails: There is no clubbing.   Neurological:      Mental Status: She is alert and oriented to person, place, and time.      Cranial Nerves: No cranial nerve deficit.      Motor: No tremor.      Coordination: Coordination normal.      Gait: Gait normal.   Psychiatric:         Attention and Perception: Attention normal.         Mood and Affect: Mood normal.         Speech: Speech normal.         Behavior: Behavior normal.         Thought Content: Thought content normal.       Assessment/Plan   Problems Addressed this Visit        Allergies and Adverse Reactions    Chronic non-seasonal  allergic rhinitis     Continue current medication           Cardiac and Vasculature    Mixed hyperlipidemia  Encouraged to continue to work on her diet and exercise plan.  Continue current medication    Relevant Medications    aspirin (Aspirin Adult Low Strength) 81 MG EC tablet    atorvastatin (LIPITOR) 40 MG tablet       Endocrine and Metabolic    Morbidly obese (CMS/HCC)       Gastrointestinal Abdominal     Gastroesophageal reflux disease without esophagitis   Symptoms are currently well controlled.  Reminded regarding lifestyle modification.  Continue current medication.    Relevant Medications    dexlansoprazole (DEXILANT) 60 MG capsule       Health Encounters    Healthcare maintenance  Encouraged again to obtain a COVID-19 immunization.  Lengthy discussion regarding the potential benefits and risks.  Recommended a flu shot when available  She remains uninterested in any cancer screening       Mental Health    Chronic anxiety  Significant situational component.   Supportive therapy.   Continue current medication.    Relevant Medications    busPIRone (BUSPAR) 10 MG tablet    DULoxetine (CYMBALTA) 30 MG capsule    hydrOXYzine pamoate (VISTARIL) 25 MG capsule       Musculoskeletal and Injuries    Chronic pain of both knees       Neuro    DDD (degenerative disc disease), cervical  Reminded regarding symptomatic treatment.   Continue current medication    Relevant Medications    acetaminophen (Acetaminophen Extra Strength) 500 MG tablet    cyclobenzaprine (FLEXERIL) 10 MG tablet    DULoxetine (CYMBALTA) 30 MG capsule    gabapentin (NEURONTIN) 600 MG tablet    DDD (degenerative disc disease), lumbar       Pulmonary and Pneumonias    COPD mixed type (CMS/HCC)   COPD is stable.  Reminded of the importance of smoking cessation  Encouraged to remain as active as symptoms allow for  Continue current medication    Relevant Medications    albuterol (PROVENTIL) (2.5 MG/3ML) 0.083% nebulizer solution    albuterol sulfate HFA  108 (90 Base) MCG/ACT inhaler    montelukast (SINGULAIR) 10 MG tablet    Fluticasone-Umeclidin-Vilant (Trelegy Ellipta) 100-62.5-25 MCG/INH inhaler       Tobacco    Smoker            Diagnoses       Codes Comments    Chronic non-seasonal allergic rhinitis    -  Primary ICD-10-CM: J30.89  ICD-9-CM: 477.9     Mixed hyperlipidemia     ICD-10-CM: E78.2  ICD-9-CM: 272.2     Morbidly obese (CMS/HCC)     ICD-10-CM: E66.01  ICD-9-CM: 278.01     Gastroesophageal reflux disease without esophagitis     ICD-10-CM: K21.9  ICD-9-CM: 530.81     Healthcare maintenance     ICD-10-CM: Z00.00  ICD-9-CM: V70.0     Chronic anxiety     ICD-10-CM: F41.9  ICD-9-CM: 300.00     Chronic pain of both knees     ICD-10-CM: M25.561, M25.562, G89.29  ICD-9-CM: 719.46, 338.29     DDD (degenerative disc disease), lumbar     ICD-10-CM: M51.36  ICD-9-CM: 722.52     DDD (degenerative disc disease), cervical     ICD-10-CM: M50.30  ICD-9-CM: 722.4     COPD mixed type (CMS/[facility])     ICD-10-CM: J44.9  ICD-9-CM: 496     Smoker     ICD-10-CM: F17.200  ICD-9-CM: 305.1     COPD with acute exacerbation (CMS/[facility])     ICD-10-CM: J44.1  ICD-9-CM: 491.21 Findings and recommendations discussed. Will start Prednisone and Doxycycline and Prednisone. Aggressive neb treatments and smoking cessation encouraged.    Chronic obstructive pulmonary disease, unspecified COPD type (CMS/[facility])     ICD-10-CM: J44.9  ICD-9-CM: 496     Anxiety     ICD-10-CM: F41.9  ICD-9-CM: 300.00     Acute pain of left shoulder     ICD-10-CM: M25.512  ICD-9-CM: 719.41 Symptoms and treatment options reviewed.

## 2021-08-14 VITALS
HEIGHT: 64 IN | BODY MASS INDEX: 35.85 KG/M2 | OXYGEN SATURATION: 96 % | RESPIRATION RATE: 15 BRPM | TEMPERATURE: 97.1 F | WEIGHT: 210 LBS | HEART RATE: 87 BPM | DIASTOLIC BLOOD PRESSURE: 65 MMHG | SYSTOLIC BLOOD PRESSURE: 122 MMHG

## 2021-08-14 PROBLEM — E55.9 VITAMIN D DEFICIENCY: Status: ACTIVE | Noted: 2021-08-14

## 2021-08-14 RX ORDER — ALBUTEROL SULFATE 90 UG/1
2 AEROSOL, METERED RESPIRATORY (INHALATION) EVERY 4 HOURS PRN
Qty: 36 G | Refills: 5 | Status: SHIPPED | OUTPATIENT
Start: 2021-08-14 | End: 2022-04-14 | Stop reason: SDUPTHER

## 2021-08-14 RX ORDER — ATORVASTATIN CALCIUM 40 MG/1
40 TABLET, FILM COATED ORAL
Qty: 30 TABLET | Refills: 5 | Status: SHIPPED | OUTPATIENT
Start: 2021-08-14 | End: 2022-04-14 | Stop reason: SDUPTHER

## 2021-08-14 RX ORDER — ALBUTEROL SULFATE 2.5 MG/3ML
2.5 SOLUTION RESPIRATORY (INHALATION) EVERY 4 HOURS PRN
Qty: 180 EACH | Refills: 5 | Status: SHIPPED | OUTPATIENT
Start: 2021-08-14 | End: 2022-07-06

## 2021-08-14 RX ORDER — ERGOCALCIFEROL 1.25 MG/1
50000 CAPSULE ORAL WEEKLY
Qty: 4 CAPSULE | Refills: 5 | Status: SHIPPED | OUTPATIENT
Start: 2021-08-14 | End: 2022-04-14 | Stop reason: SDUPTHER

## 2021-08-14 RX ORDER — BUSPIRONE HYDROCHLORIDE 10 MG/1
10 TABLET ORAL 3 TIMES DAILY
Qty: 90 TABLET | Refills: 5 | Status: SHIPPED | OUTPATIENT
Start: 2021-08-14 | End: 2021-10-06

## 2021-08-14 RX ORDER — GABAPENTIN 600 MG/1
600 TABLET ORAL 3 TIMES DAILY
Qty: 90 TABLET | Refills: 2 | Status: SHIPPED | OUTPATIENT
Start: 2021-08-14 | End: 2021-11-04

## 2021-08-14 RX ORDER — ASPIRIN 81 MG/1
81 TABLET ORAL DAILY
Qty: 30 TABLET | Refills: 5 | Status: SHIPPED | OUTPATIENT
Start: 2021-08-14 | End: 2022-04-14 | Stop reason: SDUPTHER

## 2021-08-14 RX ORDER — DEXLANSOPRAZOLE 60 MG/1
60 CAPSULE, DELAYED RELEASE ORAL DAILY
Qty: 30 CAPSULE | Refills: 5 | Status: SHIPPED | OUTPATIENT
Start: 2021-08-14 | End: 2022-03-02

## 2021-08-14 RX ORDER — IBUPROFEN 800 MG/1
800 TABLET ORAL EVERY 8 HOURS PRN
Qty: 30 TABLET | Refills: 5 | Status: SHIPPED | OUTPATIENT
Start: 2021-08-14 | End: 2022-04-14 | Stop reason: SDUPTHER

## 2021-08-14 RX ORDER — HYDROXYZINE PAMOATE 25 MG/1
25 CAPSULE ORAL 3 TIMES DAILY PRN
Qty: 90 CAPSULE | Refills: 5 | Status: SHIPPED | OUTPATIENT
Start: 2021-08-14 | End: 2021-10-06

## 2021-08-14 RX ORDER — CYCLOBENZAPRINE HCL 10 MG
10 TABLET ORAL 3 TIMES DAILY
Qty: 90 TABLET | Refills: 5 | Status: SHIPPED | OUTPATIENT
Start: 2021-08-14 | End: 2021-10-06

## 2021-08-14 RX ORDER — ACETAMINOPHEN 500 MG
1000 TABLET ORAL EVERY 6 HOURS PRN
Qty: 180 TABLET | Refills: 5 | Status: SHIPPED | OUTPATIENT
Start: 2021-08-14 | End: 2022-03-02

## 2021-08-14 RX ORDER — MONTELUKAST SODIUM 10 MG/1
10 TABLET ORAL DAILY
Qty: 30 TABLET | Refills: 5 | Status: SHIPPED | OUTPATIENT
Start: 2021-08-14 | End: 2021-10-06

## 2021-08-14 RX ORDER — DULOXETIN HYDROCHLORIDE 30 MG/1
30 CAPSULE, DELAYED RELEASE ORAL DAILY
Qty: 30 CAPSULE | Refills: 5 | Status: SHIPPED | OUTPATIENT
Start: 2021-08-14 | End: 2022-03-02

## 2021-10-06 DIAGNOSIS — J44.9 COPD MIXED TYPE (HCC): ICD-10-CM

## 2021-10-06 DIAGNOSIS — M50.30 DDD (DEGENERATIVE DISC DISEASE), CERVICAL: ICD-10-CM

## 2021-10-06 DIAGNOSIS — F41.9 ANXIETY: ICD-10-CM

## 2021-10-06 DIAGNOSIS — J44.9 CHRONIC OBSTRUCTIVE PULMONARY DISEASE, UNSPECIFIED COPD TYPE (HCC): ICD-10-CM

## 2021-10-06 RX ORDER — BUSPIRONE HYDROCHLORIDE 10 MG/1
TABLET ORAL
Qty: 90 TABLET | Refills: 5 | Status: SHIPPED | OUTPATIENT
Start: 2021-10-06 | End: 2022-04-14 | Stop reason: SDUPTHER

## 2021-10-06 RX ORDER — HYDROXYZINE PAMOATE 25 MG/1
CAPSULE ORAL
Qty: 90 CAPSULE | Refills: 5 | Status: SHIPPED | OUTPATIENT
Start: 2021-10-06 | End: 2022-04-14 | Stop reason: SDUPTHER

## 2021-10-06 RX ORDER — MONTELUKAST SODIUM 10 MG/1
TABLET ORAL
Qty: 30 TABLET | Refills: 5 | Status: SHIPPED | OUTPATIENT
Start: 2021-10-06 | End: 2022-04-14 | Stop reason: SDUPTHER

## 2021-10-06 RX ORDER — CYCLOBENZAPRINE HCL 10 MG
TABLET ORAL
Qty: 90 TABLET | Refills: 5 | Status: SHIPPED | OUTPATIENT
Start: 2021-10-06 | End: 2022-04-14 | Stop reason: SDUPTHER

## 2021-11-04 DIAGNOSIS — M50.30 DDD (DEGENERATIVE DISC DISEASE), CERVICAL: ICD-10-CM

## 2021-11-04 RX ORDER — GABAPENTIN 600 MG/1
TABLET ORAL
Qty: 90 TABLET | Refills: 2 | Status: SHIPPED | OUTPATIENT
Start: 2021-11-04 | End: 2022-03-02

## 2021-12-13 ENCOUNTER — OFFICE VISIT (OUTPATIENT)
Dept: FAMILY MEDICINE CLINIC | Facility: CLINIC | Age: 51
End: 2021-12-13

## 2021-12-13 VITALS
RESPIRATION RATE: 15 BRPM | OXYGEN SATURATION: 97 % | DIASTOLIC BLOOD PRESSURE: 65 MMHG | BODY MASS INDEX: 35.85 KG/M2 | WEIGHT: 210 LBS | HEIGHT: 64 IN | SYSTOLIC BLOOD PRESSURE: 124 MMHG | TEMPERATURE: 97.7 F | HEART RATE: 89 BPM

## 2021-12-13 DIAGNOSIS — M50.30 DDD (DEGENERATIVE DISC DISEASE), CERVICAL: ICD-10-CM

## 2021-12-13 DIAGNOSIS — F17.200 SMOKER: ICD-10-CM

## 2021-12-13 DIAGNOSIS — E66.01 CLASS 2 SEVERE OBESITY WITH SERIOUS COMORBIDITY AND BODY MASS INDEX (BMI) OF 36.0 TO 36.9 IN ADULT, UNSPECIFIED OBESITY TYPE (HCC): ICD-10-CM

## 2021-12-13 DIAGNOSIS — G89.29 CHRONIC PAIN OF BOTH KNEES: ICD-10-CM

## 2021-12-13 DIAGNOSIS — M51.36 DDD (DEGENERATIVE DISC DISEASE), LUMBAR: ICD-10-CM

## 2021-12-13 DIAGNOSIS — J30.89 CHRONIC NON-SEASONAL ALLERGIC RHINITIS: Primary | ICD-10-CM

## 2021-12-13 DIAGNOSIS — F41.9 CHRONIC ANXIETY: ICD-10-CM

## 2021-12-13 DIAGNOSIS — J44.9 COPD MIXED TYPE (HCC): ICD-10-CM

## 2021-12-13 DIAGNOSIS — K21.9 GASTROESOPHAGEAL REFLUX DISEASE WITHOUT ESOPHAGITIS: ICD-10-CM

## 2021-12-13 DIAGNOSIS — E55.9 VITAMIN D DEFICIENCY: ICD-10-CM

## 2021-12-13 DIAGNOSIS — E78.2 MIXED HYPERLIPIDEMIA: ICD-10-CM

## 2021-12-13 DIAGNOSIS — M25.561 CHRONIC PAIN OF BOTH KNEES: ICD-10-CM

## 2021-12-13 DIAGNOSIS — Z00.00 HEALTHCARE MAINTENANCE: ICD-10-CM

## 2021-12-13 DIAGNOSIS — M25.562 CHRONIC PAIN OF BOTH KNEES: ICD-10-CM

## 2021-12-13 PROCEDURE — 99214 OFFICE O/P EST MOD 30 MIN: CPT | Performed by: GENERAL PRACTICE

## 2021-12-13 RX ORDER — CIPROFLOXACIN 500 MG/1
500 TABLET, FILM COATED ORAL 2 TIMES DAILY
Qty: 20 TABLET | Refills: 0 | Status: SHIPPED | OUTPATIENT
Start: 2021-12-13 | End: 2021-12-23

## 2021-12-13 RX ORDER — PREDNISONE 20 MG/1
TABLET ORAL
Qty: 20 TABLET | Refills: 0 | Status: SHIPPED | OUTPATIENT
Start: 2021-12-13 | End: 2021-12-23

## 2021-12-13 NOTE — PROGRESS NOTES
Deonte Acosta is a 51 y.o. female.     Chief Complaint  She returns for a scheduled reassessment of multiple medical problems including cold-like symptoms, COPD, GERD, and chronic anxiety    History of Present Illness     COPD  She returns with a 1 week history of increased nasal congestion associated with postnasal drip, sore throat, increased cough productive of thick green sputum, increased shortness of breath, and intermittent chills.  There is no history of any other upper respiratory tract symptoms and she denies any chest pain or hemoptysis.  There is no history of any nausea, vomiting, abdominal pain or diarrhea and she denies any fever.  She remains on trelegy and states she is using albuterol 2-3 times daily on average.  She has had multiple admissions to hospital in the past for exacerbations associated with pneumonia but has done well over the last few years.  She continues to smoke 1/2 pack/day.  She received her second Moderna COVID-19 vaccination on 11/5/2021.  She has yet to receive a flu shot.  Several grandchildren were sick 2 to 3 weeks ago.  One tested negative for COVID-19    GERD  She has a long history of intermittent heartburn and bitter taste in mouth.  There is no history of any abdominal bloating, dysphagia, hematemesis, melena or unexpected weight loss. Symptoms appear to be worsened by large meals, lying down and fatty foods. Risk factors present for GERD include tobacco abuse, caffeine use and obesity. Risk factors absent for GERD are alcohol use and NSAID use. Studies performed so far include none. Treatments tried so far include proton pump inhibitor: dexlansoprazole. Results of treatment: good control of her symptoms    Bilateral Knee Pain  She continues to have knee pain worse on the right. This is described as a sharp anterior ache worse with weightbearing.  The pain does not radiate elsewhere but has been associated with intermittent swelling and a sense of giving  way.  There is no history of any stiffness or locking.     Neck Pain  Symptoms have been present for a number of years and are unchanged. The pain is located in the posterior neck bilaterally and radiates to the left posterior shoulder. The pain is described as sharp and stiffness and occurs intermittently. She rates her pain as moderate. Symptoms are exacerbated by any movement and prolonged posture.  Within the few years she has had similar pain about her lower back.  Symptoms are improved by gentle exercise/stretches, heat and cyclobenzaprine and gabapentin. She has tingling about the left shoulder associated with the neck pain. She denies any tingling elsewhere and has had no weakness, numbness, or changes in her bowel/bladder control. MRI of the cervical spine performed on 6/29/15 was reported as showing DDD and OA.      Anxiety  She has a long history of intermittent nervousness, worrying, insomnia, fatigue, and feelings of losing control. She denies any suicidal ideation.Treatment has included medication duloxetine, buspirone and hydroxyzine. She denies any apparent side effects.  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 She is currently being prescribed the following medication for her dyslipidemia - atorvastatin, omega 3 fatty acids. Patient denies side effects associated with her medications.      The following portions of the patient's history were reviewed and updated as appropriate: allergies, current medications, past medical history, past social history and problem list.    Review of Systems   Constitutional: Positive for chills and fatigue. Negative for appetite change, fever and unexpected weight change.   HENT: Positive for congestion, postnasal drip, rhinorrhea and sore throat. Negative for ear pain and sneezing.    Eyes: Negative for itching and visual disturbance.    Respiratory: Positive for cough, shortness of breath and wheezing.    Cardiovascular: Negative for chest pain, palpitations and leg swelling.   Gastrointestinal: Negative for abdominal pain, blood in stool, constipation, diarrhea, nausea and vomiting.        Occasional heartburn   Genitourinary: Negative for dysuria and hematuria.   Musculoskeletal: Positive for arthralgias, back pain and neck pain. Negative for joint swelling and myalgias.   Skin: Negative for rash.   Neurological: Positive for numbness (bilateral neck and posterior shoulders worse on the left). Negative for weakness and headaches.   Psychiatric/Behavioral: Positive for sleep disturbance. Negative for dysphoric mood and suicidal ideas. The patient is nervous/anxious.      Objective   Physical Exam  Constitutional:       General: She is not in acute distress.     Appearance: Normal appearance. She is well-developed. She is not diaphoretic.      Comments: Appeared somewhat older than stated age.  Bright and in fair spirits. No apparent distress. No pallor, jaundice, diaphoresis, or cyanosis.     HENT:      Head: Atraumatic.      Right Ear: Tympanic membrane, ear canal and external ear normal.      Left Ear: Tympanic membrane, ear canal and external ear normal.   Eyes:      Conjunctiva/sclera: Conjunctivae normal.   Neck:      Thyroid: No thyroid mass or thyromegaly.      Vascular: No carotid bruit or JVD.      Trachea: Trachea normal. No tracheal deviation.   Cardiovascular:      Rate and Rhythm: Normal rate and regular rhythm.      Heart sounds: Normal heart sounds, S1 normal and S2 normal. No murmur heard.  No gallop.    Pulmonary:      Effort: Pulmonary effort is normal.      Breath sounds: Examination of the right-lower field reveals decreased breath sounds. Examination of the left-lower field reveals decreased breath sounds. Decreased breath sounds and wheezing (diffuse -moderate) present. No rales.      Comments: Pulmonary  hyperinflation  Chest:   Breasts:      Right: No supraclavicular adenopathy.      Left: No supraclavicular adenopathy.       Abdominal:      General: Bowel sounds are normal. There is no distension or abdominal bruit.      Palpations: Abdomen is soft. There is no hepatomegaly, splenomegaly or mass.      Tenderness: There is no abdominal tenderness.      Hernia: No hernia is present.   Musculoskeletal:      Right lower leg: No edema.      Left lower leg: No edema.      Comments: No peripheral joint redness or warmth.   Lymphadenopathy:      Head:      Right side of head: No submental, submandibular, tonsillar, preauricular, posterior auricular or occipital adenopathy.      Left side of head: No submental, submandibular, tonsillar, preauricular, posterior auricular or occipital adenopathy.      Cervical: No cervical adenopathy.      Upper Body:      Right upper body: No supraclavicular adenopathy.      Left upper body: No supraclavicular adenopathy.   Skin:     General: Skin is warm.      Coloration: Skin is not cyanotic, jaundiced or pale.      Findings: No rash.      Nails: There is no clubbing.   Neurological:      Mental Status: She is alert and oriented to person, place, and time.      Cranial Nerves: No cranial nerve deficit.      Motor: No tremor.      Coordination: Coordination normal.      Gait: Gait normal.   Psychiatric:         Attention and Perception: Attention normal.         Mood and Affect: Mood normal.         Speech: Speech normal.         Behavior: Behavior normal.         Thought Content: Thought content normal.       Assessment/Plan   Problems Addressed this Visit        Allergies and Adverse Reactions    Chronic non-seasonal allergic rhinitis     Reminded regarding allergen avoidance.  Continue current medication           Cardiac and Vasculature    Mixed hyperlipidemia  Encouraged to continue to work on her diet and exercise plan.  Continue current medication       Endocrine and Metabolic     Class 2 severe obesity with serious comorbidity and body mass index (BMI) of 36.0 to 36.9 in adult (HCC)    Vitamin D deficiency       Gastrointestinal Abdominal     Gastroesophageal reflux disease without esophagitis   Symptoms are currently well controlled.  Reminded regarding lifestyle modification.  Continue current medication.       Health Encounters    Healthcare maintenance  Recommended a flu shot, Tdap, Shingrix, mammogram, and screening colonoscopy.  Patient will consider       Mental Health    Chronic anxiety  Significant situational component.   Supportive therapy.   Continue current medication.       Musculoskeletal and Injuries    Chronic pain of both knees  Reminded regarding symptomatic treatment.   Continue current medication       Neuro    DDD (degenerative disc disease), cervical  As above.    DDD (degenerative disc disease), lumbar  As above.       Pulmonary and Pneumonias    COPD mixed type (HCC)  With recent exacerbation  Reminded of the importance of smoking cessation  Empiric ciprofloxacin with a course of prednisone.  Advised of the risk of tendon rupture.  Patient will hold these medicines and report if she should experience any new musculoskeletal pain  CT of the lungs will be arranged  Encouraged to report if any worse, any new symptoms, or if not back to baseline over the next 1 to 2 weeks    Relevant Medications    ciprofloxacin (CIPRO) 500 MG tablet    predniSONE (DELTASONE) 20 MG tablet    Other Relevant Orders    CT Chest Without Contrast Diagnostic       Tobacco    Smoker      Diagnoses       Codes Comments    Chronic non-seasonal allergic rhinitis    -  Primary ICD-10-CM: J30.89  ICD-9-CM: 477.9     Mixed hyperlipidemia     ICD-10-CM: E78.2  ICD-9-CM: 272.2     Class 2 severe obesity with serious comorbidity and body mass index (BMI) of 36.0 to 36.9 in adult, unspecified obesity type (HCC)     ICD-10-CM: E66.01, Z68.36  ICD-9-CM: 278.01, V85.36     Vitamin D deficiency      ICD-10-CM: E55.9  ICD-9-CM: 268.9     Gastroesophageal reflux disease without esophagitis     ICD-10-CM: K21.9  ICD-9-CM: 530.81     Healthcare maintenance     ICD-10-CM: Z00.00  ICD-9-CM: V70.0     Chronic anxiety     ICD-10-CM: F41.9  ICD-9-CM: 300.00     Chronic pain of both knees     ICD-10-CM: M25.561, M25.562, G89.29  ICD-9-CM: 719.46, 338.29     DDD (degenerative disc disease), cervical     ICD-10-CM: M50.30  ICD-9-CM: 722.4     DDD (degenerative disc disease), lumbar     ICD-10-CM: M51.36  ICD-9-CM: 722.52     COPD mixed type (HCC)     ICD-10-CM: J44.9  ICD-9-CM: 496     Smoker     ICD-10-CM: F17.200  ICD-9-CM: 305.1

## 2022-01-13 DIAGNOSIS — J44.9 COPD MIXED TYPE: Primary | ICD-10-CM

## 2022-03-01 DIAGNOSIS — K21.9 GASTROESOPHAGEAL REFLUX DISEASE WITHOUT ESOPHAGITIS: ICD-10-CM

## 2022-03-01 DIAGNOSIS — M50.30 DDD (DEGENERATIVE DISC DISEASE), CERVICAL: ICD-10-CM

## 2022-03-01 DIAGNOSIS — F41.9 CHRONIC ANXIETY: ICD-10-CM

## 2022-03-02 RX ORDER — GABAPENTIN 600 MG/1
TABLET ORAL
Qty: 90 TABLET | Refills: 1 | Status: SHIPPED | OUTPATIENT
Start: 2022-03-02 | End: 2022-04-14 | Stop reason: SDUPTHER

## 2022-03-02 RX ORDER — DEXLANSOPRAZOLE 60 MG/1
CAPSULE, DELAYED RELEASE ORAL
Qty: 30 CAPSULE | Refills: 5 | Status: SHIPPED | OUTPATIENT
Start: 2022-03-02 | End: 2022-04-14 | Stop reason: SDUPTHER

## 2022-03-02 RX ORDER — DULOXETIN HYDROCHLORIDE 30 MG/1
30 CAPSULE, DELAYED RELEASE ORAL DAILY
Qty: 30 CAPSULE | Refills: 5 | Status: SHIPPED | OUTPATIENT
Start: 2022-03-02 | End: 2022-04-14 | Stop reason: SDUPTHER

## 2022-03-02 RX ORDER — PSEUDOEPHED/ACETAMINOPH/DIPHEN 30MG-500MG
TABLET ORAL
Qty: 180 TABLET | Refills: 5 | Status: SHIPPED | OUTPATIENT
Start: 2022-03-02 | End: 2022-10-04

## 2022-04-14 ENCOUNTER — OFFICE VISIT (OUTPATIENT)
Dept: FAMILY MEDICINE CLINIC | Facility: CLINIC | Age: 52
End: 2022-04-14

## 2022-04-14 VITALS
RESPIRATION RATE: 16 BRPM | SYSTOLIC BLOOD PRESSURE: 122 MMHG | OXYGEN SATURATION: 98 % | TEMPERATURE: 98.6 F | BODY MASS INDEX: 36.19 KG/M2 | DIASTOLIC BLOOD PRESSURE: 62 MMHG | WEIGHT: 212 LBS | HEART RATE: 95 BPM | HEIGHT: 64 IN

## 2022-04-14 DIAGNOSIS — M51.36 DDD (DEGENERATIVE DISC DISEASE), LUMBAR: ICD-10-CM

## 2022-04-14 DIAGNOSIS — F41.9 CHRONIC ANXIETY: ICD-10-CM

## 2022-04-14 DIAGNOSIS — E66.01 CLASS 2 SEVERE OBESITY WITH SERIOUS COMORBIDITY AND BODY MASS INDEX (BMI) OF 36.0 TO 36.9 IN ADULT, UNSPECIFIED OBESITY TYPE: ICD-10-CM

## 2022-04-14 DIAGNOSIS — J44.9 COPD MIXED TYPE: ICD-10-CM

## 2022-04-14 DIAGNOSIS — M25.562 CHRONIC PAIN OF BOTH KNEES: ICD-10-CM

## 2022-04-14 DIAGNOSIS — M25.512 ACUTE PAIN OF LEFT SHOULDER: ICD-10-CM

## 2022-04-14 DIAGNOSIS — J44.9 CHRONIC OBSTRUCTIVE PULMONARY DISEASE, UNSPECIFIED COPD TYPE: ICD-10-CM

## 2022-04-14 DIAGNOSIS — F17.200 SMOKER: ICD-10-CM

## 2022-04-14 DIAGNOSIS — M50.30 DDD (DEGENERATIVE DISC DISEASE), CERVICAL: ICD-10-CM

## 2022-04-14 DIAGNOSIS — G89.29 CHRONIC PAIN OF BOTH KNEES: ICD-10-CM

## 2022-04-14 DIAGNOSIS — K21.9 GASTROESOPHAGEAL REFLUX DISEASE WITHOUT ESOPHAGITIS: ICD-10-CM

## 2022-04-14 DIAGNOSIS — E55.9 VITAMIN D DEFICIENCY: ICD-10-CM

## 2022-04-14 DIAGNOSIS — M25.561 CHRONIC PAIN OF BOTH KNEES: ICD-10-CM

## 2022-04-14 DIAGNOSIS — E78.2 MIXED HYPERLIPIDEMIA: ICD-10-CM

## 2022-04-14 DIAGNOSIS — Z00.00 HEALTHCARE MAINTENANCE: ICD-10-CM

## 2022-04-14 DIAGNOSIS — J30.89 CHRONIC NON-SEASONAL ALLERGIC RHINITIS: Primary | ICD-10-CM

## 2022-04-14 DIAGNOSIS — F41.9 ANXIETY: ICD-10-CM

## 2022-04-14 PROCEDURE — 99214 OFFICE O/P EST MOD 30 MIN: CPT | Performed by: GENERAL PRACTICE

## 2022-04-14 RX ORDER — CYCLOBENZAPRINE HCL 10 MG
10 TABLET ORAL 3 TIMES DAILY
Qty: 90 TABLET | Refills: 5 | Status: SHIPPED | OUTPATIENT
Start: 2022-04-14 | End: 2022-10-24

## 2022-04-14 RX ORDER — ERGOCALCIFEROL 1.25 MG/1
50000 CAPSULE ORAL WEEKLY
Qty: 4 CAPSULE | Refills: 5 | Status: SHIPPED | OUTPATIENT
Start: 2022-04-14 | End: 2022-12-19

## 2022-04-14 RX ORDER — DULOXETIN HYDROCHLORIDE 30 MG/1
30 CAPSULE, DELAYED RELEASE ORAL DAILY
Qty: 30 CAPSULE | Refills: 5 | Status: SHIPPED | OUTPATIENT
Start: 2022-04-14 | End: 2023-01-27 | Stop reason: SDUPTHER

## 2022-04-14 RX ORDER — PREDNISONE 20 MG/1
TABLET ORAL
Qty: 14 TABLET | Refills: 0 | Status: SHIPPED | OUTPATIENT
Start: 2022-04-14 | End: 2022-04-24

## 2022-04-14 RX ORDER — ALBUTEROL SULFATE 90 UG/1
2 AEROSOL, METERED RESPIRATORY (INHALATION) EVERY 4 HOURS PRN
Qty: 36 G | Refills: 5 | Status: SHIPPED | OUTPATIENT
Start: 2022-04-14 | End: 2023-01-27

## 2022-04-14 RX ORDER — BUSPIRONE HYDROCHLORIDE 10 MG/1
10 TABLET ORAL 3 TIMES DAILY
Qty: 90 TABLET | Refills: 5 | Status: SHIPPED | OUTPATIENT
Start: 2022-04-14 | End: 2022-10-24

## 2022-04-14 RX ORDER — IBUPROFEN 800 MG/1
800 TABLET ORAL EVERY 8 HOURS PRN
Qty: 30 TABLET | Refills: 5 | Status: SHIPPED | OUTPATIENT
Start: 2022-04-14 | End: 2022-10-04 | Stop reason: SDUPTHER

## 2022-04-14 RX ORDER — GABAPENTIN 600 MG/1
600 TABLET ORAL 3 TIMES DAILY
Qty: 90 TABLET | Refills: 3 | Status: SHIPPED | OUTPATIENT
Start: 2022-04-14 | End: 2022-09-06 | Stop reason: SDUPTHER

## 2022-04-14 RX ORDER — MONTELUKAST SODIUM 10 MG/1
10 TABLET ORAL DAILY
Qty: 30 TABLET | Refills: 5 | Status: SHIPPED | OUTPATIENT
Start: 2022-04-14 | End: 2022-10-24

## 2022-04-14 RX ORDER — DEXLANSOPRAZOLE 60 MG/1
1 CAPSULE, DELAYED RELEASE ORAL DAILY
Qty: 30 CAPSULE | Refills: 5 | Status: SHIPPED | OUTPATIENT
Start: 2022-04-14 | End: 2023-01-27 | Stop reason: SDUPTHER

## 2022-04-14 RX ORDER — HYDROXYZINE PAMOATE 25 MG/1
25 CAPSULE ORAL 3 TIMES DAILY PRN
Qty: 90 CAPSULE | Refills: 5 | Status: SHIPPED | OUTPATIENT
Start: 2022-04-14 | End: 2022-10-24

## 2022-04-14 RX ORDER — ATORVASTATIN CALCIUM 40 MG/1
40 TABLET, FILM COATED ORAL
Qty: 30 TABLET | Refills: 5 | Status: SHIPPED | OUTPATIENT
Start: 2022-04-14 | End: 2022-11-22

## 2022-04-14 RX ORDER — ASPIRIN 81 MG/1
81 TABLET ORAL DAILY
Qty: 30 TABLET | Refills: 5 | Status: SHIPPED | OUTPATIENT
Start: 2022-04-14 | End: 2023-01-27 | Stop reason: SDUPTHER

## 2022-04-14 NOTE — PROGRESS NOTES
Deonte Acosta is a 51 y.o. female.     Chief Complaint  She returns for a scheduled reassessment of multiple medical problems including COPD, GERD, neck and bilateral knee pain, and hyperlipidemia    History of Present Illness     COPD  She returns with a 2 day history of increased nasal congestion associated with postnasal drip, left ear fullness, increased cough, and increased shortness of breath.  Yesterday she experienced nausea, vomiting, and diarrhea but the symptoms have since resolved and she is eating well today.  There is no history of any other upper respiratory tract symptoms and she denies any chest pain or hemoptysis.  There is no history of any abdominal pain, hematochezia, or melena and she denies any dysuria, hematuria, fever, or chills.  She remains on trelegy and states she is using albuterol 4-5 times daily on average.  She has had multiple admissions to hospital in the past for exacerbations associated with pneumonia but has done well over the last few years.  She continues to smoke 1/2 pack/day.  She received her second Moderna COVID-19 vaccination on 11/5/2021.  She was scheduled for a CT at her last visit but her insurance required a chest x-ray before hand and she did not follow-up with this.  One of her grandchildren was sick last week.    GERD  Her heartburn remains under good control with dexlansoprazole.  She denies any difficulty swallowing and has had no regurgitation. Symptoms appear to be worsened by large meals, lying down and fatty foods. Risk factors present for GERD include tobacco abuse, caffeine use and obesity. Risk factors absent for GERD are alcohol use and NSAID use. Studies performed so far include none.    Neck Pain  She has a long history of bilateral posterior neck pain radiating to the left posterior shoulder. The pain is described as sharp. She rates her pain as moderate. Symptoms are exacerbated by any movement and prolonged posture.  Within the few  years she has had similar pain about her lower back.  Symptoms are improved by gentle exercise/stretches, heat and cyclobenzaprine and gabapentin. She has tingling about the left shoulder associated with the neck pain. She denies any tingling elsewhere and has had no weakness, numbness, or changes in her bowel/bladder control. MRI of the cervical spine performed on 6/29/15 was reported as showing DDD and OA.      Bilateral Knee Pain  She has a long history of bilateral knee pain worse on the right. This is described as a sharp anterior ache worse with weightbearing.  The pain does not radiate elsewhere but has been associated with intermittent swelling and a sense of giving way.  There is no history of any stiffness or locking.     Anxiety  She has a long history of intermittent nervousness, worrying, insomnia, fatigue, and feelings of losing control. She denies any suicidal ideation.Treatment has included medication duloxetine, buspirone and hydroxyzine. She denies any apparent side effects.  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 She is currently being prescribed the following medication for her dyslipidemia - atorvastatin, omega 3 fatty acids. Patient denies side effects associated with her medications.  She has had no recent labs    The following portions of the patient's history were reviewed and updated as appropriate: allergies, current medications, past medical history, past social history and problem list.    Review of Systems   Constitutional: Positive for fatigue. Negative for appetite change, chills, fever and unexpected weight change.   HENT: Positive for congestion, ear pain (left ear fullness), postnasal drip and rhinorrhea. Negative for sneezing and sore throat.    Eyes: Negative for itching and visual disturbance.   Respiratory: Positive for cough, shortness of breath and  wheezing.    Cardiovascular: Negative for chest pain, palpitations and leg swelling.   Gastrointestinal: Negative for abdominal pain, blood in stool, constipation, diarrhea, nausea and vomiting.        Occasional heartburn   Genitourinary: Negative for dysuria and hematuria.   Musculoskeletal: Positive for arthralgias, back pain and neck pain. Negative for joint swelling and myalgias.   Skin: Negative for rash.   Neurological: Positive for numbness (bilateral neck and posterior shoulders worse on the left). Negative for weakness and headaches.   Psychiatric/Behavioral: Positive for sleep disturbance. Negative for dysphoric mood and suicidal ideas. The patient is nervous/anxious.      Objective   Physical Exam  Constitutional:       General: She is not in acute distress.     Appearance: Normal appearance. She is well-developed. She is not diaphoretic.      Comments: Appeared somewhat older than stated age.  Bright and in fair spirits. No apparent distress. No pallor, jaundice, diaphoresis, or cyanosis.     HENT:      Head: Atraumatic.      Right Ear: Tympanic membrane, ear canal and external ear normal.      Left Ear: Tympanic membrane, ear canal and external ear normal.   Eyes:      Conjunctiva/sclera: Conjunctivae normal.   Neck:      Thyroid: No thyroid mass or thyromegaly.      Vascular: No carotid bruit or JVD.      Trachea: Trachea normal. No tracheal deviation.   Cardiovascular:      Rate and Rhythm: Normal rate and regular rhythm.      Heart sounds: Normal heart sounds, S1 normal and S2 normal. No murmur heard.    No gallop.   Pulmonary:      Effort: Pulmonary effort is normal.      Breath sounds: Examination of the right-lower field reveals decreased breath sounds. Examination of the left-lower field reveals decreased breath sounds. Decreased breath sounds and wheezing (diffuse -moderate) present. No rales.      Comments: Pulmonary hyperinflation  Chest:   Breasts:      Right: No supraclavicular adenopathy.       Left: No supraclavicular adenopathy.       Abdominal:      General: Bowel sounds are normal. There is no distension or abdominal bruit.      Palpations: Abdomen is soft. There is no hepatomegaly, splenomegaly or mass.      Tenderness: There is no abdominal tenderness.      Hernia: No hernia is present.   Musculoskeletal:      Right lower leg: No edema.      Left lower leg: No edema.      Comments: No peripheral joint redness or warmth.   Lymphadenopathy:      Head:      Right side of head: No submental, submandibular, tonsillar, preauricular, posterior auricular or occipital adenopathy.      Left side of head: No submental, submandibular, tonsillar, preauricular, posterior auricular or occipital adenopathy.      Cervical: No cervical adenopathy.      Upper Body:      Right upper body: No supraclavicular adenopathy.      Left upper body: No supraclavicular adenopathy.   Skin:     General: Skin is warm.      Coloration: Skin is not cyanotic, jaundiced or pale.      Findings: No rash.      Nails: There is no clubbing.   Neurological:      Mental Status: She is alert and oriented to person, place, and time.      Cranial Nerves: No cranial nerve deficit.      Motor: No tremor.      Coordination: Coordination normal.      Gait: Gait normal.   Psychiatric:         Attention and Perception: Attention normal.         Mood and Affect: Mood normal.         Speech: Speech normal.         Behavior: Behavior normal.         Thought Content: Thought content normal.       Assessment/Plan   Problems Addressed this Visit        Allergies and Adverse Reactions    Chronic non-seasonal allergic rhinitis    Continue current medication               Cardiac and Vasculature    Mixed hyperlipidemia  Encouraged to continue to work on her diet and exercise plan.  Continue current medication  Updated labs will be drawn at her return.    Relevant Medications    atorvastatin (LIPITOR) 40 MG tablet    aspirin (Aspirin Adult Low Strength) 81  MG EC tablet       Endocrine and Metabolic    Class 2 severe obesity with serious comorbidity and body mass index (BMI) of 36.0 to 36.9 in adult ([facility])    Vitamin D deficiency  Continue supplementation with monitoring.    Relevant Medications    vitamin D (ERGOCALCIFEROL) 1.25 MG (47363 UT) capsule capsule       Gastrointestinal Abdominal     Gastroesophageal reflux disease without esophagitis   Symptoms are currently well controlled.  Reminded regarding lifestyle modification.  Continue current medication.    Relevant Medications    dexlansoprazole (Dexilant) 60 MG capsule       Health Encounters    Healthcare maintenance  Patient interested in pursuing a low-dose CT of the chest if approved by her insurance  Reminded that she is also due for a mammogram, screening colonoscopy, Tdap, and Shingrix    Relevant Orders     CT Chest Low Dose Cancer Screening WO       Mental Health    Chronic anxiety  Significant situational component.   Supportive therapy.   Continue current medication.    Relevant Medications    hydrOXYzine pamoate (VISTARIL) 25 MG capsule    DULoxetine (CYMBALTA) 30 MG capsule    busPIRone (BUSPAR) 10 MG tablet       Musculoskeletal and Injuries    Chronic pain of both knees  Reminded regarding symptomatic treatment.   Continue current medication       Neuro    DDD (degenerative disc disease), cervical  As above.    Relevant Medications    gabapentin (NEURONTIN) 600 MG tablet    DULoxetine (CYMBALTA) 30 MG capsule    cyclobenzaprine (FLEXERIL) 10 MG tablet    DDD (degenerative disc disease), lumbar       Pulmonary and Pneumonias    COPD mixed type ([facility])  With recent exacerbation likely associated with a viral URTI  Reminded of the importance of smoking cessation  Agreed on a short course of prednisone  Encouraged to report if any worse, any new symptoms, or if not resolving over the next week    Relevant Medications    predniSONE (DELTASONE) 20 MG tablet    Fluticasone-Umeclidin-Vilant (Trelegy  Ellipta) 100-62.5-25 MCG/INH inhaler    montelukast (SINGULAIR) 10 MG tablet    albuterol sulfate  (90 Base) MCG/ACT inhaler       Tobacco    Smoker       Diagnoses       Codes Comments    Chronic non-seasonal allergic rhinitis    -  Primary ICD-10-CM: J30.89  ICD-9-CM: 477.9     Mixed hyperlipidemia     ICD-10-CM: E78.2  ICD-9-CM: 272.2     Class 2 severe obesity with serious comorbidity and body mass index (BMI) of 36.0 to 36.9 in adult, unspecified obesity type (HCC)     ICD-10-CM: E66.01, Z68.36  ICD-9-CM: 278.01, V85.36     Vitamin D deficiency     ICD-10-CM: E55.9  ICD-9-CM: 268.9     Gastroesophageal reflux disease without esophagitis     ICD-10-CM: K21.9  ICD-9-CM: 530.81     Healthcare maintenance     ICD-10-CM: Z00.00  ICD-9-CM: V70.0     Chronic anxiety     ICD-10-CM: F41.9  ICD-9-CM: 300.00     Chronic pain of both knees     ICD-10-CM: M25.561, M25.562, G89.29  ICD-9-CM: 719.46, 338.29     DDD (degenerative disc disease), cervical     ICD-10-CM: M50.30  ICD-9-CM: 722.4     DDD (degenerative disc disease), lumbar     ICD-10-CM: M51.36  ICD-9-CM: 722.52     COPD mixed type (HCC)     ICD-10-CM: J44.9  ICD-9-CM: 496     Smoker     ICD-10-CM: F17.200  ICD-9-CM: 305.1     Chronic obstructive pulmonary disease, unspecified COPD type (HCC)     ICD-10-CM: J44.9  ICD-9-CM: 496     Acute pain of left shoulder     ICD-10-CM: M25.512  ICD-9-CM: 719.41 Symptoms and treatment options reviewed.     Anxiety     ICD-10-CM: F41.9  ICD-9-CM: 300.00

## 2022-05-11 ENCOUNTER — APPOINTMENT (OUTPATIENT)
Dept: CT IMAGING | Facility: HOSPITAL | Age: 52
End: 2022-05-11

## 2022-07-06 DIAGNOSIS — J44.1 COPD WITH ACUTE EXACERBATION: Chronic | ICD-10-CM

## 2022-07-06 RX ORDER — ALBUTEROL SULFATE 2.5 MG/3ML
SOLUTION RESPIRATORY (INHALATION)
Qty: 180 ML | Refills: 5 | Status: SHIPPED | OUTPATIENT
Start: 2022-07-06

## 2022-07-20 ENCOUNTER — OFFICE VISIT (OUTPATIENT)
Dept: FAMILY MEDICINE CLINIC | Facility: CLINIC | Age: 52
End: 2022-07-20

## 2022-07-20 DIAGNOSIS — J44.1 COPD WITH ACUTE EXACERBATION: Primary | Chronic | ICD-10-CM

## 2022-07-20 DIAGNOSIS — J30.89 CHRONIC NON-SEASONAL ALLERGIC RHINITIS: Chronic | ICD-10-CM

## 2022-07-20 PROCEDURE — 99214 OFFICE O/P EST MOD 30 MIN: CPT | Performed by: NURSE PRACTITIONER

## 2022-07-20 PROCEDURE — 96372 THER/PROPH/DIAG INJ SC/IM: CPT | Performed by: NURSE PRACTITIONER

## 2022-07-20 RX ORDER — LEVOFLOXACIN 750 MG/1
750 TABLET ORAL DAILY
Qty: 10 TABLET | Refills: 0 | Status: SHIPPED | OUTPATIENT
Start: 2022-07-20 | End: 2022-11-29

## 2022-07-20 RX ORDER — DEXTROMETHORPHAN HYDROBROMIDE AND PROMETHAZINE HYDROCHLORIDE 15; 6.25 MG/5ML; MG/5ML
5 SYRUP ORAL 2 TIMES DAILY PRN
Qty: 180 ML | Refills: 0 | Status: SHIPPED | OUTPATIENT
Start: 2022-07-20 | End: 2022-11-29

## 2022-07-20 RX ORDER — PREDNISONE 20 MG/1
TABLET ORAL
Qty: 20 TABLET | Refills: 0 | Status: SHIPPED | OUTPATIENT
Start: 2022-07-20 | End: 2022-11-29

## 2022-07-20 RX ORDER — IPRATROPIUM BROMIDE AND ALBUTEROL SULFATE 2.5; .5 MG/3ML; MG/3ML
3 SOLUTION RESPIRATORY (INHALATION) EVERY 4 HOURS PRN
Qty: 150 ML | Refills: 0 | Status: SHIPPED | OUTPATIENT
Start: 2022-07-20 | End: 2022-11-29 | Stop reason: SDUPTHER

## 2022-07-20 RX ORDER — DEXAMETHASONE SODIUM PHOSPHATE 4 MG/ML
8 INJECTION, SOLUTION INTRA-ARTICULAR; INTRALESIONAL; INTRAMUSCULAR; INTRAVENOUS; SOFT TISSUE ONCE
Status: COMPLETED | OUTPATIENT
Start: 2022-07-20 | End: 2022-07-20

## 2022-07-20 RX ADMIN — DEXAMETHASONE SODIUM PHOSPHATE 8 MG: 4 INJECTION, SOLUTION INTRA-ARTICULAR; INTRALESIONAL; INTRAMUSCULAR; INTRAVENOUS; SOFT TISSUE at 09:52

## 2022-07-20 NOTE — PROGRESS NOTES
Injection  Injection performed in left dorsogluteal by Syeda Elder MA. Patient tolerated the procedure well without complications.  07/20/22   Syeda Elder MA

## 2022-07-20 NOTE — PROGRESS NOTES
History of Present Illness  Ebony Acosta is a 51 y.o. female presents to the office today complaining of upper respiratory symptoms which started approximately 1 week ago.  She had been at [facility] due to her 's hospitalization prior to her symptoms beginning.  He was being treated for pneumonia and CHF.  Ebony has been diagnosed with COPD, chronic allergic rhinitis. She does continue to smoke.  She did have a home COVID test was negative.    Respiratory Symptoms  This is a recurrent problem. The current episode started in the past 7 days. The problem has been gradually worsening. There has been no fever. Associated symptoms include congestion, coughing, neck pain, rhinorrhea, a sore throat and wheezing. Pertinent negatives include no chest pain, ear pain, headaches, nausea, rash, sinus pain or vomiting. She has tried inhaler use (Neb treatments) for the symptoms.     The following portions of the patient's history were reviewed and updated as appropriate: allergies, current medications, past family history, past medical history, past social history, past surgical history and problem list.    Review of Systems   Constitutional: Positive for activity change, appetite change, chills and fatigue. Negative for fever.   HENT: Positive for congestion, rhinorrhea and sore throat. Negative for ear discharge, ear pain, sinus pressure, sinus pain and trouble swallowing.         Loss of sense of smell/taste: negative   Eyes: Negative for visual disturbance.   Respiratory: Positive for cough, shortness of breath and wheezing.    Cardiovascular: Negative for chest pain.   Gastrointestinal: Negative for nausea and vomiting.   Endocrine: Negative for cold intolerance, heat intolerance, polydipsia, polyphagia and polyuria.   Musculoskeletal: Positive for arthralgias, back pain and neck pain. Negative for myalgias.   Skin: Negative for color change and rash.   Allergic/Immunologic: Positive for environmental  "allergies.   Neurological: Positive for numbness. Negative for dizziness, light-headedness and headaches.   Hematological: Negative for adenopathy.   Psychiatric/Behavioral: Positive for sleep disturbance. The patient is nervous/anxious.      Vital signs:  /80 (BP Location: Right arm, Patient Position: Sitting, Cuff Size: Adult)   Pulse 76   Temp 98 °F (36.7 °C) (Temporal)   Resp 18   Ht 161.3 cm (63.5\")   Wt 96.2 kg (212 lb)   SpO2 99%   BMI 36.96 kg/m²     Physical Exam  Vitals and nursing note reviewed.   Constitutional:       Appearance: She is well-developed. She is ill-appearing (Chronic).      Interventions: Face mask in place.   HENT:      Head: Normocephalic.      Nose: Nose normal.   Eyes:      General: No scleral icterus.        Right eye: No discharge.         Left eye: No discharge.      Conjunctiva/sclera: Conjunctivae normal.   Neck:      Thyroid: No thyromegaly.      Vascular: No JVD.   Cardiovascular:      Rate and Rhythm: Normal rate and regular rhythm.      Heart sounds: Normal heart sounds. No murmur heard.    No friction rub.   Pulmonary:      Effort: No respiratory distress.      Breath sounds: Decreased air movement present. Decreased breath sounds, wheezing and rhonchi present. No rales.   Abdominal:      General: Bowel sounds are normal. There is no distension.      Palpations: Abdomen is soft.      Tenderness: There is no abdominal tenderness. There is no guarding or rebound.   Musculoskeletal:         General: No tenderness.      Cervical back: Neck supple.   Lymphadenopathy:      Cervical: No cervical adenopathy.   Skin:     General: Skin is warm and dry.      Findings: No erythema or rash.   Neurological:      Mental Status: She is alert and oriented to person, place, and time.      Cranial Nerves: Cranial nerves are intact.      Gait: Gait is intact.   Psychiatric:         Mood and Affect: Mood and affect normal.         Speech: Speech normal.         Behavior: Behavior is " cooperative.         Thought Content: Thought content normal.         Cognition and Memory: Cognition and memory normal.     Class 2 Severe Obesity (BMI >=35 and <=39.9). Obesity-related health conditions include the following: hypertension, dyslipidemias and osteoarthritis. Obesity is unchanged. BMI is is above average; BMI management plan is completed. We discussed portion control and increasing exercise.     Assessment & Plan     Diagnoses and all orders for this visit:    1. COPD with acute exacerbation (HCC) (Primary)  Comments:  Findings and recommendations discussed with Ebony. Reviewed treatment options.  Counseled regarding supportive care measures.   Orders:  -     dexamethasone (DECADRON) injection 8 mg  -     predniSONE (DELTASONE) 20 MG tablet; Take one tablet three times a day for 3 days; then take one tablet twice daily for 3 days, then one tablet daily for 3 days then 1/2 tablet for 3 days  Dispense: 20 tablet; Refill: 0  -     levoFLOXacin (Levaquin) 750 MG tablet; Take 1 tablet by mouth Daily.  Dispense: 10 tablet; Refill: 0  -     ipratropium-albuterol (DUO-NEB) 0.5-2.5 mg/3 ml nebulizer; Take 3 mL by nebulization Every 4 (Four) Hours As Needed for Shortness of Air.  Dispense: 150 mL; Refill: 0  -     promethazine-dextromethorphan (PROMETHAZINE-DM) 6.25-15 MG/5ML syrup; Take 5 mL by mouth 2 (Two) Times a Day As Needed for Cough.  Dispense: 180 mL; Refill: 0    2. Chronic non-seasonal allergic rhinitis  Comments:  Continue Singulair.  Avoidance to allergens is much as possible    Follow Up in August Dr. Montenegro  Findings and recommendations discussed with Ebony. Reviewed treatment options.  Counseled regarding supportive care measures.  Signs and symptoms of concern reviewed and if occur to seek further medical evaluation or if symptoms worsen or do not improve.  Encouraged aggressive neb treatments and smoking cessation.  Ebony will follow up in August with Dr. Montenegro sooner if  problems/concerns occur.  Ebony was given instructions and counseling regarding her condition or for health maintenance advice. Please see specific information pulled into the AVS if appropriate    This document has been electronically signed by:

## 2022-07-23 VITALS
HEART RATE: 76 BPM | DIASTOLIC BLOOD PRESSURE: 80 MMHG | WEIGHT: 212 LBS | HEIGHT: 64 IN | SYSTOLIC BLOOD PRESSURE: 130 MMHG | BODY MASS INDEX: 36.19 KG/M2 | OXYGEN SATURATION: 99 % | TEMPERATURE: 98 F | RESPIRATION RATE: 18 BRPM

## 2022-09-06 DIAGNOSIS — M50.30 DDD (DEGENERATIVE DISC DISEASE), CERVICAL: ICD-10-CM

## 2022-09-06 RX ORDER — GABAPENTIN 600 MG/1
600 TABLET ORAL 3 TIMES DAILY
Qty: 90 TABLET | Refills: 1 | Status: SHIPPED | OUTPATIENT
Start: 2022-09-06 | End: 2022-10-04 | Stop reason: SDUPTHER

## 2022-09-06 NOTE — TELEPHONE ENCOUNTER
Caller: Ebony Acosta    Relationship: Self    Best call back number:[phone]    What is the best time to reach you: NA    Who are you requesting to speak with (clinical staff, provider,  specific staff member): NA    Do you know the name of the person who called: NA    What was the call regarding: PATIENT STATED SHE IS OUT OF HER GABAPENTIN     Do you require a callback:  NA

## 2022-10-04 DIAGNOSIS — M50.30 DDD (DEGENERATIVE DISC DISEASE), CERVICAL: ICD-10-CM

## 2022-10-04 DIAGNOSIS — M25.512 ACUTE PAIN OF LEFT SHOULDER: ICD-10-CM

## 2022-10-04 RX ORDER — PSEUDOEPHED/ACETAMINOPH/DIPHEN 30MG-500MG
TABLET ORAL
Qty: 180 TABLET | Refills: 5 | Status: SHIPPED | OUTPATIENT
Start: 2022-10-04 | End: 2023-01-27 | Stop reason: SDUPTHER

## 2022-10-04 RX ORDER — GABAPENTIN 600 MG/1
600 TABLET ORAL 3 TIMES DAILY
Qty: 90 TABLET | Refills: 0 | Status: SHIPPED | OUTPATIENT
Start: 2022-10-04 | End: 2022-10-24

## 2022-10-04 RX ORDER — IBUPROFEN 800 MG/1
TABLET ORAL
Qty: 30 TABLET | Refills: 5 | OUTPATIENT
Start: 2022-10-04

## 2022-10-04 RX ORDER — IBUPROFEN 800 MG/1
800 TABLET ORAL EVERY 8 HOURS PRN
Qty: 30 TABLET | Refills: 5 | Status: SHIPPED | OUTPATIENT
Start: 2022-10-04 | End: 2023-01-27 | Stop reason: SDUPTHER

## 2022-10-24 DIAGNOSIS — J44.9 CHRONIC OBSTRUCTIVE PULMONARY DISEASE, UNSPECIFIED COPD TYPE: ICD-10-CM

## 2022-10-24 DIAGNOSIS — M50.30 DDD (DEGENERATIVE DISC DISEASE), CERVICAL: ICD-10-CM

## 2022-10-24 DIAGNOSIS — F41.9 ANXIETY: ICD-10-CM

## 2022-10-24 RX ORDER — BUSPIRONE HYDROCHLORIDE 10 MG/1
TABLET ORAL
Qty: 90 TABLET | Refills: 0 | Status: SHIPPED | OUTPATIENT
Start: 2022-10-24 | End: 2022-11-29

## 2022-10-24 RX ORDER — GABAPENTIN 600 MG/1
TABLET ORAL
Qty: 90 TABLET | Refills: 0 | Status: SHIPPED | OUTPATIENT
Start: 2022-10-24 | End: 2022-11-29 | Stop reason: SDUPTHER

## 2022-10-24 RX ORDER — CYCLOBENZAPRINE HCL 10 MG
TABLET ORAL
Qty: 90 TABLET | Refills: 0 | Status: SHIPPED | OUTPATIENT
Start: 2022-10-24 | End: 2022-11-29

## 2022-10-24 RX ORDER — MONTELUKAST SODIUM 10 MG/1
TABLET ORAL
Qty: 30 TABLET | Refills: 0 | Status: SHIPPED | OUTPATIENT
Start: 2022-10-24 | End: 2022-11-29

## 2022-10-24 RX ORDER — HYDROXYZINE PAMOATE 25 MG/1
CAPSULE ORAL
Qty: 90 CAPSULE | Refills: 0 | Status: SHIPPED | OUTPATIENT
Start: 2022-10-24 | End: 2022-11-29

## 2022-10-24 NOTE — TELEPHONE ENCOUNTER
Dr. Parekh is out of the office this week. Could you refill this?     Pt was last seen on 4/14/2022.  Will put TRA on your desk.          Pt does not have a follow up scheduled. Will you all call her to arrange this?

## 2022-11-22 DIAGNOSIS — E78.2 MIXED HYPERLIPIDEMIA: ICD-10-CM

## 2022-11-22 RX ORDER — ATORVASTATIN CALCIUM 40 MG/1
40 TABLET, FILM COATED ORAL
Qty: 30 TABLET | Refills: 5 | Status: SHIPPED | OUTPATIENT
Start: 2022-11-22 | End: 2023-01-27 | Stop reason: SDUPTHER

## 2022-11-29 ENCOUNTER — OFFICE VISIT (OUTPATIENT)
Dept: FAMILY MEDICINE CLINIC | Facility: CLINIC | Age: 52
End: 2022-11-29

## 2022-11-29 VITALS — WEIGHT: 212 LBS | HEIGHT: 64 IN | BODY MASS INDEX: 36.19 KG/M2

## 2022-11-29 DIAGNOSIS — M50.30 DDD (DEGENERATIVE DISC DISEASE), CERVICAL: ICD-10-CM

## 2022-11-29 DIAGNOSIS — J44.1 COPD WITH ACUTE EXACERBATION: Primary | Chronic | ICD-10-CM

## 2022-11-29 DIAGNOSIS — J06.9 VIRAL URI WITH COUGH: ICD-10-CM

## 2022-11-29 DIAGNOSIS — F41.9 ANXIETY: ICD-10-CM

## 2022-11-29 DIAGNOSIS — J44.9 CHRONIC OBSTRUCTIVE PULMONARY DISEASE, UNSPECIFIED COPD TYPE: ICD-10-CM

## 2022-11-29 PROCEDURE — 99213 OFFICE O/P EST LOW 20 MIN: CPT | Performed by: PHYSICIAN ASSISTANT

## 2022-11-29 RX ORDER — IPRATROPIUM BROMIDE AND ALBUTEROL SULFATE 2.5; .5 MG/3ML; MG/3ML
3 SOLUTION RESPIRATORY (INHALATION) EVERY 4 HOURS PRN
Qty: 150 ML | Refills: 0 | Status: SHIPPED | OUTPATIENT
Start: 2022-11-29 | End: 2023-03-07

## 2022-11-29 RX ORDER — BUSPIRONE HYDROCHLORIDE 10 MG/1
TABLET ORAL
Qty: 90 TABLET | Refills: 0 | Status: SHIPPED | OUTPATIENT
Start: 2022-11-29 | End: 2023-01-27 | Stop reason: SDUPTHER

## 2022-11-29 RX ORDER — GABAPENTIN 600 MG/1
600 TABLET ORAL 3 TIMES DAILY
Qty: 90 TABLET | Refills: 0 | Status: SHIPPED | OUTPATIENT
Start: 2022-11-29 | End: 2023-01-05 | Stop reason: SDUPTHER

## 2022-11-29 RX ORDER — GUAIFENESIN AND DEXTROMETHORPHAN HYDROBROMIDE 100; 10 MG/5ML; MG/5ML
5-10 SOLUTION ORAL EVERY 6 HOURS PRN
Qty: 60 ML | Refills: 0 | Status: SHIPPED | OUTPATIENT
Start: 2022-11-29 | End: 2023-01-27

## 2022-11-29 RX ORDER — DOXYCYCLINE HYCLATE 100 MG/1
100 CAPSULE ORAL 2 TIMES DAILY
Qty: 20 CAPSULE | Refills: 0 | Status: SHIPPED | OUTPATIENT
Start: 2022-11-29 | End: 2022-12-09

## 2022-11-29 RX ORDER — CYCLOBENZAPRINE HCL 10 MG
TABLET ORAL
Qty: 90 TABLET | Refills: 0 | Status: SHIPPED | OUTPATIENT
Start: 2022-11-29 | End: 2023-01-27 | Stop reason: SDUPTHER

## 2022-11-29 RX ORDER — FLUTICASONE PROPIONATE 50 MCG
2 SPRAY, SUSPENSION (ML) NASAL DAILY
Qty: 16 G | Refills: 0 | Status: SHIPPED | OUTPATIENT
Start: 2022-11-29 | End: 2023-01-27

## 2022-11-29 RX ORDER — HYDROXYZINE PAMOATE 25 MG/1
CAPSULE ORAL
Qty: 90 CAPSULE | Refills: 0 | Status: SHIPPED | OUTPATIENT
Start: 2022-11-29 | End: 2023-01-27 | Stop reason: SDUPTHER

## 2022-11-29 RX ORDER — MONTELUKAST SODIUM 10 MG/1
TABLET ORAL
Qty: 30 TABLET | Refills: 0 | Status: SHIPPED | OUTPATIENT
Start: 2022-11-29 | End: 2023-01-27 | Stop reason: SDUPTHER

## 2022-11-29 RX ORDER — PROMETHAZINE HYDROCHLORIDE 6.25 MG/5ML
6.25-12.5 SYRUP ORAL EVERY 6 HOURS PRN
Qty: 60 ML | Refills: 0 | Status: SHIPPED | OUTPATIENT
Start: 2022-11-29 | End: 2023-01-27

## 2022-11-29 NOTE — PROGRESS NOTES
Subjective        Chief Complaint  Diarrhea, Sore Throat, and Cough    You have chosen to receive care through a video visit. Do you consent to use a telephone visit for your medical care today? Yes The patient is at their home.  I am at the [facility] clinic in [place].    Subjective      History of Present Illness  Ebony Acosta is a 52 y.o. female who presents today to [facility] for Diarrhea, Sore Throat, and Cough. Past medical history is significant for COPD, asthma, anxiety, depression,     Diarrhea, Sore Throat, and Cough:  She reports about 1 week of feeling poorly with cough, chest/sinus congestion, nausea, fever, and chills. She has had a sore throat. She reports some intermittent wheezing. She has had infrequent diarrhea. Denies any known Flu or covid + contacts. She has not been tested.       Current Outpatient Medications:   •  Acetaminophen Extra Strength 500 MG tablet, TAKE 2 TABLETS BY MOUTH EVERY 6 HOURS AS NEEDED FOR MILD PAIN ., Disp: 180 tablet, Rfl: 5  •  albuterol (PROVENTIL) (2.5 MG/3ML) 0.083% nebulizer solution, USE 1 VIAL IN NEBULIZER EVERY 4 HOURS AS NEEDED FOR WHEEZING, Disp: 180 mL, Rfl: 5  •  albuterol sulfate  (90 Base) MCG/ACT inhaler, Inhale 2 puffs Every 4 (Four) Hours As Needed for Wheezing., Disp: 36 g, Rfl: 5  •  aspirin (Aspirin Adult Low Strength) 81 MG EC tablet, Take 1 tablet by mouth Daily., Disp: 30 tablet, Rfl: 5  •  atorvastatin (LIPITOR) 40 MG tablet, TAKE 1 TABLET BY MOUTH EVERY NIGHT AT BEDTIME., Disp: 30 tablet, Rfl: 5  •  dexlansoprazole (Dexilant) 60 MG capsule, Take 1 capsule by mouth Daily., Disp: 30 capsule, Rfl: 5  •  DULoxetine (CYMBALTA) 30 MG capsule, Take 1 capsule by mouth Daily., Disp: 30 capsule, Rfl: 5  •  Fluticasone-Umeclidin-Vilant (Trelegy Ellipta) 100-62.5-25 MCG/INH inhaler, Inhale 1 puff Every Morning., Disp: 60 each, Rfl: 5  •  ibuprofen (ADVIL,MOTRIN) 800 MG tablet, Take 1  tablet by mouth Every 8 (Eight) Hours As Needed for Mild Pain or Moderate Pain., Disp: 30 tablet, Rfl: 5  •  ipratropium-albuterol (DUO-NEB) 0.5-2.5 mg/3 ml nebulizer, Take 3 mL by nebulization Every 4 (Four) Hours As Needed for Shortness of Air., Disp: 150 mL, Rfl: 0  •  vitamin D (ERGOCALCIFEROL) 1.25 MG (00207 UT) capsule capsule, Take 1 capsule by mouth 1 (One) Time Per Week., Disp: 4 capsule, Rfl: 5  •  albuterol (PROVENTIL,VENTOLIN) 2 MG/5ML syrup, Take 5-10 mL by mouth Every 6 (Six) Hours As Needed (cough). Mix with Tussin DM and promethazine syrup for 3 way cough, Disp: 60 mL, Rfl: 0  •  busPIRone (BUSPAR) 10 MG tablet, TAKE 1 TABLET BY MOUTH THREE TIMES A DAY, Disp: 90 tablet, Rfl: 0  •  cyclobenzaprine (FLEXERIL) 10 MG tablet, TAKE ONE TABLET BY MOUTH THREE TIMES A DAY, Disp: 90 tablet, Rfl: 0  •  dextromethorphan-guaifenesin (Tussin DM)  MG/5ML syrup, Take 5-10 mL by mouth Every 6 (Six) Hours As Needed (cough). Mix with albuterol syrup and Promethazine syrup for 3 way cough, Disp: 60 mL, Rfl: 0  •  doxycycline (VIBRAMYCIN) 100 MG capsule, Take 1 capsule by mouth 2 (Two) Times a Day for 10 days., Disp: 20 capsule, Rfl: 0  •  fluticasone (Flonase) 50 MCG/ACT nasal spray, 2 sprays into the nostril(s) as directed by provider Daily., Disp: 16 g, Rfl: 0  •  gabapentin (NEURONTIN) 600 MG tablet, Take 1 tablet by mouth 3 (Three) Times a Day., Disp: 90 tablet, Rfl: 0  •  hydrOXYzine pamoate (VISTARIL) 25 MG capsule, TAKE ONE CAPSULE BY MOUTH THREE TIMES A DAY AS NEEDED FOR ANXIETY, Disp: 90 capsule, Rfl: 0  •  montelukast (SINGULAIR) 10 MG tablet, TAKE ONE TABLET BY MOUTH ONCE DAILY, Disp: 30 tablet, Rfl: 0  •  promethazine (PHENERGAN) 6.25 MG/5ML syrup, Take 5-10 mL by mouth Every 6 (Six) Hours As Needed for Nausea or Vomiting. Mix with Tussin DM and albuterol syrup for 3 way cough., Disp: 60 mL, Rfl: 0      Allergies   Allergen Reactions   • Penicillins    • Percocet [Oxycodone-Acetaminophen]   "      Objective     Objective   Vital Signs:  Height 161.3 cm (63.5\")   Weight 96.2 kg (212 lb)   Body Mass Index 36.97 kg/m²   Estimated body mass index is 36.97 kg/m² as calculated from the following:    Height as of this encounter: 161.3 cm (63.5\").    Weight as of this encounter: 96.2 kg (212 lb).        Past Medical History:   Diagnosis Date   • Anxiety    • Asthma    • COPD (chronic obstructive pulmonary disease) (HCC)    • Depression    • Hip pain    • Hyperlipidemia    • Low back pain    • Neck pain    • Osteoarthritis    • Osteophyte of cervical spine      Past Surgical History:   Procedure Laterality Date   • BREAST BIOPSY     • CYST REMOVAL     • LAPAROSCOPY DIAGNOSTIC / BIOPSY / ASPIRATION / LYSIS     • MOUTH SURGERY       Social History     Socioeconomic History   • Marital status:      Spouse name: jennifer   • Number of children: 4   • Years of education: 12   Tobacco Use   • Smoking status: Every Day     Packs/day: 1.00     Types: Cigarettes   • Smokeless tobacco: Never   Vaping Use   • Vaping Use: Never used   Substance and Sexual Activity   • Alcohol use: No   • Drug use: No   • Sexual activity: Defer      Physical Exam   This physical exam has been personally performed remotely in the unit aided by real-time audio/visual communication tools. The use of a video visit has been reviewed with the patient and verbal informed consent has been obtained.     Physical Exam:  General: Patient appears awake, alert, and in no acute distress.  Head: Normocephalic, atraumatic  Eyes: EOMI. Conjunctivae and sclerae normal.  Ears: Ears appear intact with no abnormalities noted.   Neck: Trachea midline. No obvious JVD.  Lungs: Respirations appear to be regular, even and unlabored with no signs of respiratory distress. No audible wheezing.  Abdomen: No obvious abdominal distension.  MS: Muscle tone appears normal. No gross deformities.  Extremities: No clubbing, cyanosis or edema noted.  Skin: No visible " bleeding, bruising, or rash.  Neurologic: Alert and oriented x3. No gross focal deficits.     Result Review :  The following data was reviewed by: REGI Diamond on 11/29/2022:  No visits with results within 3 Month(s) from this visit.   Latest known visit with results is:   Office Visit on 06/22/2021   Component Date Value Ref Range Status   • Glucose 06/22/2021 94  65 - 99 mg/dL Final   • BUN 06/22/2021 15  6 - 20 mg/dL Final   • Creatinine 06/22/2021 0.87  0.57 - 1.00 mg/dL Final   • Sodium 06/22/2021 139  136 - 145 mmol/L Final   • Potassium 06/22/2021 3.9  3.5 - 5.2 mmol/L Final   • Chloride 06/22/2021 104  98 - 107 mmol/L Final   • CO2 06/22/2021 25.6  22.0 - 29.0 mmol/L Final   • Calcium 06/22/2021 9.0  8.6 - 10.5 mg/dL Final   • Total Protein 06/22/2021 6.8  6.0 - 8.5 g/dL Final   • Albumin 06/22/2021 4.00  3.50 - 5.20 g/dL Final   • ALT (SGPT) 06/22/2021 12  1 - 33 U/L Final   • AST (SGOT) 06/22/2021 10  1 - 32 U/L Final   • Alkaline Phosphatase 06/22/2021 126 (H)  39 - 117 U/L Final   • Total Bilirubin 06/22/2021 0.3  0.0 - 1.2 mg/dL Final   • eGFR Non  Amer 06/22/2021 68  >60 mL/min/1.73 Final   • Globulin 06/22/2021 2.8  gm/dL Final   • A/G Ratio 06/22/2021 1.4  g/dL Final   • BUN/Creatinine Ratio 06/22/2021 17.2  7.0 - 25.0 Final   • Anion Gap 06/22/2021 9.4  5.0 - 15.0 mmol/L Final   • Total Cholesterol 06/22/2021 180  0 - 200 mg/dL Final   • Triglycerides 06/22/2021 178 (H)  0 - 150 mg/dL Final   • HDL Cholesterol 06/22/2021 33 (L)  40 - 60 mg/dL Final   • LDL Cholesterol  06/22/2021 115 (H)  0 - 100 mg/dL Final   • VLDL Cholesterol 06/22/2021 32  5 - 40 mg/dL Final   • LDL/HDL Ratio 06/22/2021 3.38   Final   • TSH 06/22/2021 1.810  0.270 - 4.200 uIU/mL Final   • 25 Hydroxy, Vitamin D 06/22/2021 13.2  ng/ml Final   • WBC 06/22/2021 7.68  3.40 - 10.80 10*3/mm3 Final   • RBC 06/22/2021 5.22  3.77 - 5.28 10*6/mm3 Final   • Hemoglobin 06/22/2021 15.0  12.0 - 15.9 g/dL Final   • Hematocrit  06/22/2021 45.6  34.0 - 46.6 % Final   • MCV 06/22/2021 87.4  79.0 - 97.0 fL Final   • MCH 06/22/2021 28.7  26.6 - 33.0 pg Final   • MCHC 06/22/2021 32.9  31.5 - 35.7 g/dL Final   • RDW 06/22/2021 14.6  12.3 - 15.4 % Final   • RDW-SD 06/22/2021 47.1  37.0 - 54.0 fl Final   • MPV 06/22/2021 10.5  6.0 - 12.0 fL Final   • Platelets 06/22/2021 340  140 - 450 10*3/mm3 Final   • Neutrophil % 06/22/2021 61.4  42.7 - 76.0 % Final   • Lymphocyte % 06/22/2021 30.1  19.6 - 45.3 % Final   • Monocyte % 06/22/2021 6.0  5.0 - 12.0 % Final   • Eosinophil % 06/22/2021 1.8  0.3 - 6.2 % Final   • Basophil % 06/22/2021 0.4  0.0 - 1.5 % Final   • Immature Grans % 06/22/2021 0.3  0.0 - 0.5 % Final   • Neutrophils, Absolute 06/22/2021 4.72  1.70 - 7.00 10*3/mm3 Final   • Lymphocytes, Absolute 06/22/2021 2.31  0.70 - 3.10 10*3/mm3 Final   • Monocytes, Absolute 06/22/2021 0.46  0.10 - 0.90 10*3/mm3 Final   • Eosinophils, Absolute 06/22/2021 0.14  0.00 - 0.40 10*3/mm3 Final   • Basophils, Absolute 06/22/2021 0.03  0.00 - 0.20 10*3/mm3 Final   • Immature Grans, Absolute 06/22/2021 0.02  0.00 - 0.05 10*3/mm3 Final   • nRBC 06/22/2021 0.0  0.0 - 0.2 /100 WBC Final          Assessment / Plan         Assessment   Diagnoses and all orders for this visit:    1. COPD with acute exacerbation (HCC) (Primary)  2. Viral URI with cough  • She plans to come by for a respiratory panel swab when they come to the pharmacy.   • She has been unable to find her nebulizer machine with a recent move. Will see if a new machine would be covered by her insurance.   • Refill of duonebs supplied.   • Add 3 way cough syrup.   • Add flonase daily.   • Given her symptoms have been present for 1 week and are worsening, will cover with antibiotic therapy with doxycycline 100mg BID.   • Return to clinic if no improvement noted or if symptoms are worsening.     -     ipratropium-albuterol (DUO-NEB) 0.5-2.5 mg/3 ml nebulizer; Take 3 mL by nebulization Every 4 (Four) Hours As  Needed for Shortness of Air.  Dispense: 150 mL; Refill: 0  -     Respiratory Panel PCR w/COVID-19(SARS-CoV-2) MICHAEL/TED/NORI/PAD/COR/MAD/DANIE In-House, NP Swab in [place], 3-4 HR TAT - Swab, Nasopharynx; Future  -     Home Nebulizer  -     Home Nebulizer Accessories  -     promethazine (PHENERGAN) 6.25 MG/5ML syrup; Take 5-10 mL by mouth Every 6 (Six) Hours As Needed for Nausea or Vomiting. Mix with Tussin DM and albuterol syrup for 3 way cough.  Dispense: 60 mL; Refill: 0  -     dextromethorphan-guaifenesin (Tussin DM)  MG/5ML syrup; Take 5-10 mL by mouth Every 6 (Six) Hours As Needed (cough). Mix with albuterol syrup and Promethazine syrup for 3 way cough  Dispense: 60 mL; Refill: 0  -     albuterol (PROVENTIL,VENTOLIN) 2 MG/5ML syrup; Take 5-10 mL by mouth Every 6 (Six) Hours As Needed (cough). Mix with Tussin DM and promethazine syrup for 3 way cough  Dispense: 60 mL; Refill: 0  -     fluticasone (Flonase) 50 MCG/ACT nasal spray; 2 sprays into the nostril(s) as directed by provider Daily.  Dispense: 16 g; Refill: 0  -     doxycycline (VIBRAMYCIN) 100 MG capsule; Take 1 capsule by mouth 2 (Two) Times a Day for 10 days.  Dispense: 20 capsule; Refill: 0     New Medications Ordered This Visit   Medications   • ipratropium-albuterol (DUO-NEB) 0.5-2.5 mg/3 ml nebulizer     Sig: Take 3 mL by nebulization Every 4 (Four) Hours As Needed for Shortness of Air.     Dispense:  150 mL     Refill:  0   • promethazine (PHENERGAN) 6.25 MG/5ML syrup     Sig: Take 5-10 mL by mouth Every 6 (Six) Hours As Needed for Nausea or Vomiting. Mix with Tussin DM and albuterol syrup for 3 way cough.     Dispense:  60 mL     Refill:  0   • dextromethorphan-guaifenesin (Tussin DM)  MG/5ML syrup     Sig: Take 5-10 mL by mouth Every 6 (Six) Hours As Needed (cough). Mix with albuterol syrup and Promethazine syrup for 3 way cough     Dispense:  60 mL     Refill:  0   • albuterol (PROVENTIL,VENTOLIN) 2 MG/5ML syrup     Sig: Take 5-10 mL by  mouth Every 6 (Six) Hours As Needed (cough). Mix with Tussin DM and promethazine syrup for 3 way cough     Dispense:  60 mL     Refill:  0   • fluticasone (Flonase) 50 MCG/ACT nasal spray     Si sprays into the nostril(s) as directed by provider Daily.     Dispense:  16 g     Refill:  0   • doxycycline (VIBRAMYCIN) 100 MG capsule     Sig: Take 1 capsule by mouth 2 (Two) Times a Day for 10 days.     Dispense:  20 capsule     Refill:  0     Health Maintenance  • She is over due for her mammogram and colorectal screening. Consider discussion at next visit.   • Consider influenza vaccine if not performed elsewhere.     Follow Up   Return if symptoms worsen or fail to improve.    Patient was given instructions and counseling regarding her condition or for health maintenance advice. Please see specific information pulled into the AVS if appropriate.       This document has been electronically signed by REGI Diamond   2022 06:38 EST    Dictated Utilizing Dragon Dictation: Part of this note may be an electronic transcription/translation of spoken language to printed text using the Dragon Dictation System.

## 2022-12-07 ENCOUNTER — TELEPHONE (OUTPATIENT)
Dept: FAMILY MEDICINE CLINIC | Facility: CLINIC | Age: 52
End: 2022-12-07

## 2022-12-07 DIAGNOSIS — J20.9 ACUTE BRONCHITIS, UNSPECIFIED ORGANISM: Primary | ICD-10-CM

## 2022-12-07 NOTE — TELEPHONE ENCOUNTER
----- Message from Syeda Elder MA sent at 12/7/2022  3:08 PM EST -----  Sending this to you since you seen her last.  ----- Message -----  From: Mckayla Elder RegSched Rep  Sent: 12/7/2022   2:35 PM EST  To: [facility]    Ebony is needing her script for nebulizer sent to [facility] please

## 2022-12-17 DIAGNOSIS — E55.9 VITAMIN D DEFICIENCY: ICD-10-CM

## 2022-12-19 RX ORDER — ERGOCALCIFEROL 1.25 MG/1
CAPSULE ORAL
Qty: 4 CAPSULE | Refills: 5 | Status: SHIPPED | OUTPATIENT
Start: 2022-12-19

## 2023-01-05 DIAGNOSIS — M50.30 DDD (DEGENERATIVE DISC DISEASE), CERVICAL: ICD-10-CM

## 2023-01-05 RX ORDER — GABAPENTIN 600 MG/1
600 TABLET ORAL 3 TIMES DAILY
Qty: 90 TABLET | Refills: 0 | Status: SHIPPED | OUTPATIENT
Start: 2023-01-05 | End: 2023-01-11 | Stop reason: SDUPTHER

## 2023-01-11 DIAGNOSIS — F41.9 ANXIETY: ICD-10-CM

## 2023-01-11 DIAGNOSIS — M50.30 DDD (DEGENERATIVE DISC DISEASE), CERVICAL: ICD-10-CM

## 2023-01-11 RX ORDER — GABAPENTIN 600 MG/1
600 TABLET ORAL 3 TIMES DAILY
Qty: 90 TABLET | Refills: 0 | Status: SHIPPED | OUTPATIENT
Start: 2023-01-11 | End: 2023-01-27 | Stop reason: SDUPTHER

## 2023-01-11 NOTE — TELEPHONE ENCOUNTER
Previous prescription was sent to [facility]. They do not fill prescriptions there. I called and had them cancel the prescription. Could you resend it to the [facility] that in her chart now?

## 2023-01-27 ENCOUNTER — OFFICE VISIT (OUTPATIENT)
Dept: FAMILY MEDICINE CLINIC | Facility: CLINIC | Age: 53
End: 2023-01-27
Payer: COMMERCIAL

## 2023-01-27 DIAGNOSIS — Z00.00 HEALTHCARE MAINTENANCE: ICD-10-CM

## 2023-01-27 DIAGNOSIS — E78.2 MIXED HYPERLIPIDEMIA: ICD-10-CM

## 2023-01-27 DIAGNOSIS — M25.512 ACUTE PAIN OF LEFT SHOULDER: ICD-10-CM

## 2023-01-27 DIAGNOSIS — J44.9 COPD MIXED TYPE: ICD-10-CM

## 2023-01-27 DIAGNOSIS — F41.9 ANXIETY: ICD-10-CM

## 2023-01-27 DIAGNOSIS — M25.561 CHRONIC PAIN OF BOTH KNEES: ICD-10-CM

## 2023-01-27 DIAGNOSIS — E55.9 VITAMIN D DEFICIENCY: ICD-10-CM

## 2023-01-27 DIAGNOSIS — J30.89 CHRONIC NON-SEASONAL ALLERGIC RHINITIS: Primary | ICD-10-CM

## 2023-01-27 DIAGNOSIS — M50.30 DDD (DEGENERATIVE DISC DISEASE), CERVICAL: ICD-10-CM

## 2023-01-27 DIAGNOSIS — M25.562 CHRONIC PAIN OF BOTH KNEES: ICD-10-CM

## 2023-01-27 DIAGNOSIS — F32.9 REACTIVE DEPRESSION: ICD-10-CM

## 2023-01-27 DIAGNOSIS — J44.9 CHRONIC OBSTRUCTIVE PULMONARY DISEASE, UNSPECIFIED COPD TYPE: ICD-10-CM

## 2023-01-27 DIAGNOSIS — E66.01 CLASS 2 SEVERE OBESITY WITH SERIOUS COMORBIDITY AND BODY MASS INDEX (BMI) OF 36.0 TO 36.9 IN ADULT, UNSPECIFIED OBESITY TYPE: ICD-10-CM

## 2023-01-27 DIAGNOSIS — F17.200 SMOKER: ICD-10-CM

## 2023-01-27 DIAGNOSIS — K21.9 GASTROESOPHAGEAL REFLUX DISEASE WITHOUT ESOPHAGITIS: ICD-10-CM

## 2023-01-27 DIAGNOSIS — Z12.31 ENCOUNTER FOR SCREENING MAMMOGRAM FOR BREAST CANCER: ICD-10-CM

## 2023-01-27 DIAGNOSIS — G89.29 CHRONIC PAIN OF BOTH KNEES: ICD-10-CM

## 2023-01-27 DIAGNOSIS — F41.9 CHRONIC ANXIETY: ICD-10-CM

## 2023-01-27 DIAGNOSIS — M51.36 DDD (DEGENERATIVE DISC DISEASE), LUMBAR: ICD-10-CM

## 2023-01-27 PROCEDURE — 99214 OFFICE O/P EST MOD 30 MIN: CPT | Performed by: GENERAL PRACTICE

## 2023-01-27 RX ORDER — IBUPROFEN 800 MG/1
800 TABLET ORAL EVERY 8 HOURS PRN
Qty: 30 TABLET | Refills: 5 | Status: SHIPPED | OUTPATIENT
Start: 2023-01-27

## 2023-01-27 RX ORDER — MONTELUKAST SODIUM 10 MG/1
10 TABLET ORAL DAILY
Qty: 30 TABLET | Refills: 5 | Status: SHIPPED | OUTPATIENT
Start: 2023-01-27

## 2023-01-27 RX ORDER — ASPIRIN 81 MG/1
81 TABLET ORAL DAILY
Qty: 30 TABLET | Refills: 5 | Status: SHIPPED | OUTPATIENT
Start: 2023-01-27

## 2023-01-27 RX ORDER — CYCLOBENZAPRINE HCL 10 MG
10 TABLET ORAL 3 TIMES DAILY
Qty: 90 TABLET | Refills: 5 | Status: SHIPPED | OUTPATIENT
Start: 2023-01-27

## 2023-01-27 RX ORDER — GABAPENTIN 600 MG/1
600 TABLET ORAL 3 TIMES DAILY
Qty: 90 TABLET | Refills: 2 | Status: SHIPPED | OUTPATIENT
Start: 2023-01-27

## 2023-01-27 RX ORDER — ACETAMINOPHEN 500 MG
1000 TABLET ORAL EVERY 6 HOURS PRN
Qty: 180 TABLET | Refills: 5 | Status: SHIPPED | OUTPATIENT
Start: 2023-01-27

## 2023-01-27 RX ORDER — DULOXETIN HYDROCHLORIDE 60 MG/1
60 CAPSULE, DELAYED RELEASE ORAL DAILY
Qty: 30 CAPSULE | Refills: 5 | Status: SHIPPED | OUTPATIENT
Start: 2023-01-27

## 2023-01-27 RX ORDER — DEXLANSOPRAZOLE 60 MG/1
1 CAPSULE, DELAYED RELEASE ORAL DAILY
Qty: 30 CAPSULE | Refills: 5 | Status: SHIPPED | OUTPATIENT
Start: 2023-01-27

## 2023-01-27 RX ORDER — BUSPIRONE HYDROCHLORIDE 15 MG/1
15 TABLET ORAL 3 TIMES DAILY
Qty: 90 TABLET | Refills: 5 | Status: SHIPPED | OUTPATIENT
Start: 2023-01-27

## 2023-01-27 RX ORDER — HYDROXYZINE PAMOATE 25 MG/1
25 CAPSULE ORAL 3 TIMES DAILY PRN
Qty: 90 CAPSULE | Refills: 5 | Status: SHIPPED | OUTPATIENT
Start: 2023-01-27

## 2023-01-27 RX ORDER — ATORVASTATIN CALCIUM 40 MG/1
40 TABLET, FILM COATED ORAL
Qty: 30 TABLET | Refills: 5 | Status: SHIPPED | OUTPATIENT
Start: 2023-01-27

## 2023-01-27 RX ORDER — ALBUTEROL SULFATE 90 UG/1
2 AEROSOL, METERED RESPIRATORY (INHALATION) EVERY 4 HOURS PRN
Qty: 36 G | Refills: 5 | Status: SHIPPED | OUTPATIENT
Start: 2023-01-27

## 2023-01-27 NOTE — PROGRESS NOTES
Subjective   Ebony Acosta is a 52 y.o. female.     You have chosen to receive care through a telehealth visit.  Do you consent to use a video/audio connection for your medical care today? Yes    I did not complete this video visit with the patient using MyChart but rather D[employer].  Patient was in her house and I was in the office    Chief Complaint  She returns for a scheduled reassessment of multiple medical problems including anxiety with recent depression, chronic obstructive pulmonary disease, gastroesophageal reflux disease, chronic neck, back and joint pain, and hyperlipidemia    History of Present Illness     Anxiety  She has a long history of intermittent nervousness, worrying, insomnia, fatigue, and feelings of losing control.  Since last here, her mother  and her  has developed serious health problems.  With this she has experienced an increase in her symptoms associated with depression.  She continues to deny any suicidal ideation.Treatment has included medication duloxetine, buspirone and hydroxyzine. She denies any apparent side effects.      COPD  She feels that her shortness of breath and cough remain at baseline, and continues to deny any chest pain or hemoptysis.  There is no history of any fever, or chills.  She remains on trelegy and continues to use albuterol 4-5 times daily on average.  She has had multiple admissions to hospital in the past for exacerbations associated with pneumonia but has done fairly well over the last few years.  She continues to smoke 1/2 pack/day.      GERD  Her heartburn remains under good control with dexlansoprazole.  She continues to deny any difficulty swallowing and has had no regurgitation. Symptoms appear to be worsened by large meals, lying down and fatty foods. Risk factors present for GERD include tobacco abuse, caffeine use and obesity. Risk factors absent for GERD are alcohol use and NSAID use. Studies performed so far include none.    Neck  Pain  She has a long history of bilateral posterior neck pain radiating to the left posterior shoulder. The pain is described as sharp. She rates her pain as moderate. Symptoms are exacerbated by any movement and prolonged posture.  Within the few years she has had similar pain about her lower back.  Symptoms are improved by gentle exercise/stretches, heat and cyclobenzaprine and gabapentin. She has tingling about the left shoulder associated with the neck pain. She denies any tingling elsewhere and has had no weakness, numbness, or changes in her bowel/bladder control. MRI of the cervical spine performed on 6/29/15 was reported as showing DDD and OA.      Bilateral Knee Pain  She has a long history of bilateral knee pain.  This has generally been worse on the right, but since last here has been more of a problem on the left.  The pain is described as a sharp anterior ache worse with weightbearing.  This does not radiate elsewhere but remains associated with intermittent swelling and a sense of giving way.  There is no history of any stiffness or locking.     Hyperlipidemia  Her compliance with treatment has been fairly good. She is currently being prescribed the following medication for her dyslipidemia - atorvastatin, omega 3 fatty acids. She has had no recent labs    The following portions of the patient's history were reviewed and updated as appropriate: allergies, current medications, past medical history, past social history and problem list.    Review of Systems   Constitutional: Positive for fatigue. Negative for appetite change, chills, fever and unexpected weight change.   HENT: Positive for congestion and rhinorrhea. Negative for ear pain, postnasal drip, sneezing and sore throat.    Eyes: Negative for itching and visual disturbance.   Respiratory: Positive for cough, shortness of breath and wheezing.    Cardiovascular: Negative for chest pain, palpitations and leg swelling.   Gastrointestinal: Negative  for abdominal pain, blood in stool, constipation, diarrhea, nausea and vomiting.        Occasional heartburn   Genitourinary: Negative for dysuria and hematuria.   Musculoskeletal: Positive for arthralgias, back pain and neck pain. Negative for joint swelling and myalgias.   Skin: Negative for rash.   Neurological: Positive for numbness (bilateral neck and posterior shoulders worse on the left). Negative for weakness and headaches.   Psychiatric/Behavioral: Positive for dysphoric mood and sleep disturbance. Negative for suicidal ideas. The patient is nervous/anxious.      Objective   Physical Exam  Constitutional:       Comments: Alert and in fair spirits. No apparent distress. No pallor, jaundice, diaphoresis, or cyanosis.     Pulmonary:      Comments: Normal respiratory effort.  No cough or audible wheezing  Skin:     Coloration: Skin is not cyanotic, jaundiced or pale.   Neurological:      Mental Status: She is alert and oriented to person, place, and time.      Cranial Nerves: No dysarthria or facial asymmetry.   Psychiatric:         Attention and Perception: Attention normal.         Mood and Affect: Affect is tearful (when describing her 's medical condition).         Speech: Speech normal.         Behavior: Behavior normal.         Thought Content: Thought content normal. Thought content does not include suicidal ideation.       Assessment & Plan   Problems Addressed this Visit        Allergies and Adverse Reactions    Chronic non-seasonal allergic rhinitis     Continue current medication  Encouraged to report if any worse or if any new symptoms or concerns.           Cardiac and Vasculature    Mixed hyperlipidemia  Encouraged to continue to work on her diet and exercise plan.  Continue current medication  Scheduled for updated labs    Relevant Medications    atorvastatin (LIPITOR) 40 MG tablet    aspirin (Aspirin Adult Low Strength) 81 MG EC tablet    Other Relevant Orders    Comprehensive Metabolic  Panel    Lipid Panel    TSH    Gamma GT       Endocrine and Metabolic    Class 2 severe obesity with serious comorbidity and body mass index (BMI) of 36.0 to 36.9 in adult (HCC)    Vitamin D deficiency  Continue supplementation with monitoring.    Relevant Orders    Vitamin D,25-Hydroxy       Gastrointestinal Abdominal     Gastroesophageal reflux disease without esophagitis   Symptoms are currently well controlled.  Encouraged to report if this should change.  Continue current medication    Relevant Medications    dexlansoprazole (Dexilant) 60 MG capsule    Other Relevant Orders    CBC & Differential       Genitourinary and Reproductive     Encounter for screening mammogram for breast cancer    Relevant Orders    Mammo Screening Digital Tomosynthesis Bilateral With CAD       Health Encounters    Healthcare maintenance  Recommended a flu shot along with an updated bivalent COVID-19 vaccination.  Reminded that she is due for Shingrix and an updated Tdap  Will arrange an updated mammogram and DEXA scan  Will discuss a low-dose CT of the chest again at her return    Relevant Orders    Mammo Screening Digital Tomosynthesis Bilateral With CAD    DEXA Bone Density Axial       Mental Health    Chronic anxiety  Significant situational component.   Supportive therapy.   We will titrate both duloxetine and buspirone  Encouraged to report if any worse or if any new symptoms or concerns.    Relevant Medications    hydrOXYzine pamoate (VISTARIL) 25 MG capsule    DULoxetine (CYMBALTA) 60 MG capsule    busPIRone (BUSPAR) 15 MG tablet    Other Relevant Orders    TSH    Reactive depression  As above.  Recommended referral to counseling.  Patient like to consider but agrees to report if any worse    Relevant Medications    hydrOXYzine pamoate (VISTARIL) 25 MG capsule    DULoxetine (CYMBALTA) 60 MG capsule    busPIRone (BUSPAR) 15 MG tablet    Other Relevant Orders    TSH       Musculoskeletal and Injuries    Chronic pain of both  knees  Reminded regarding symptomatic treatment.   Continue current medication  Plain films of left knee arranged.  Will notify patient of the results and any further action to be taken    Relevant Orders    XR Knee 3+ View With [place] Left       Neuro    DDD (degenerative disc disease), cervical  As above.   Continue current medication.    Relevant Medications    gabapentin (NEURONTIN) 600 MG tablet    DULoxetine (CYMBALTA) 60 MG capsule    cyclobenzaprine (FLEXERIL) 10 MG tablet    acetaminophen (Acetaminophen Extra Strength) 500 MG tablet    DDD (degenerative disc disease), lumbar  As above.       Pulmonary and Pneumonias    COPD mixed type (HCC)   COPD is stable.  Encouraged to remain as active as symptoms allow for  Continue current medication    Relevant Medications    montelukast (SINGULAIR) 10 MG tablet    Fluticasone-Umeclidin-Vilant (Trelegy Ellipta) 100-62.5-25 MCG/ACT inhaler    albuterol sulfate  (90 Base) MCG/ACT inhaler    Other Relevant Orders    CBC & Differential       Tobacco    Smoker      Diagnoses       Codes Comments    Chronic non-seasonal allergic rhinitis    -  Primary ICD-10-CM: J30.89  ICD-9-CM: 477.9     Mixed hyperlipidemia     ICD-10-CM: E78.2  ICD-9-CM: 272.2     Vitamin D deficiency     ICD-10-CM: E55.9  ICD-9-CM: 268.9     Class 2 severe obesity with serious comorbidity and body mass index (BMI) of 36.0 to 36.9 in adult, unspecified obesity type (HCC)     ICD-10-CM: E66.01, Z68.36  ICD-9-CM: 278.01, V85.36     Gastroesophageal reflux disease without esophagitis     ICD-10-CM: K21.9  ICD-9-CM: 530.81     Healthcare maintenance     ICD-10-CM: Z00.00  ICD-9-CM: V70.0     Chronic anxiety     ICD-10-CM: F41.9  ICD-9-CM: 300.00     Chronic pain of both knees     ICD-10-CM: M25.561, M25.562, G89.29  ICD-9-CM: 719.46, 338.29     DDD (degenerative disc disease), lumbar     ICD-10-CM: M51.36  ICD-9-CM: 722.52     DDD (degenerative disc disease), cervical     ICD-10-CM:  M50.30  ICD-9-CM: 722.4     COPD mixed type (HCC)     ICD-10-CM: J44.9  ICD-9-CM: 496     Chronic obstructive pulmonary disease, unspecified COPD type (HCC)     ICD-10-CM: J44.9  ICD-9-CM: 496     Acute pain of left shoulder     ICD-10-CM: M25.512  ICD-9-CM: 719.41 Symptoms and treatment options reviewed.     Anxiety     ICD-10-CM: F41.9  ICD-9-CM: 300.00     Reactive depression     ICD-10-CM: F32.9  ICD-9-CM: 300.4     Smoker     ICD-10-CM: F17.200  ICD-9-CM: 305.1     Encounter for screening mammogram for breast cancer     ICD-10-CM: Z12.31  ICD-9-CM: V76.12

## 2023-01-28 PROBLEM — F32.9 REACTIVE DEPRESSION: Status: ACTIVE | Noted: 2023-01-28

## 2023-03-07 DIAGNOSIS — J44.1 COPD WITH ACUTE EXACERBATION: Chronic | ICD-10-CM

## 2023-03-07 RX ORDER — IPRATROPIUM BROMIDE AND ALBUTEROL SULFATE 2.5; .5 MG/3ML; MG/3ML
SOLUTION RESPIRATORY (INHALATION)
Qty: 180 ML | Refills: 0 | Status: SHIPPED | OUTPATIENT
Start: 2023-03-07

## 2023-03-07 NOTE — TELEPHONE ENCOUNTER
[facility] [address] [phone] [facility] [phone] FX    Relationship: Pharmacy    Best call back number: [phone]    Requested Prescriptions:   Requested Prescriptions     Pending Prescriptions Disp Refills   • ipratropium-albuterol (DUO-NEB) 0.5-2.5 mg/3 ml nebulizer [Pharmacy Med Name: IPRAT-ALBUT 0.5-3(2.5) MG/3 0.5-2.5 (3) Solution] 180 mL 0     Sig: USE 1 VIAL IN NEBULIZER EVERY 4 HOURS AS NEEDED FOR SHORTNESS OF BREATH        Pharmacy where request should be sent: [facility], [address] - [phone] [facility] [phone] FX     Additional details provided by patient: [facility] IS OUT OF THIS MED    Does the patient have less than a 3 day supply:  [x] Yes  [] No    Would you like a call back once the refill request has been completed: [] Yes [] No    If the office needs to give you a call back, can they leave a voicemail: [] Yes [] No    Olamide Albert Rep   03/07/23 11:14 EST

## 2023-04-10 DIAGNOSIS — J44.9 COPD MIXED TYPE: ICD-10-CM

## 2023-04-10 DIAGNOSIS — F41.9 CHRONIC ANXIETY: ICD-10-CM

## 2023-04-10 DIAGNOSIS — K21.9 GASTROESOPHAGEAL REFLUX DISEASE WITHOUT ESOPHAGITIS: ICD-10-CM

## 2023-04-10 DIAGNOSIS — M50.30 DDD (DEGENERATIVE DISC DISEASE), CERVICAL: ICD-10-CM

## 2023-04-10 RX ORDER — DEXLANSOPRAZOLE 60 MG/1
1 CAPSULE, DELAYED RELEASE ORAL DAILY
Qty: 30 CAPSULE | Refills: 5 | Status: CANCELLED | OUTPATIENT
Start: 2023-04-10

## 2023-04-10 RX ORDER — DEXLANSOPRAZOLE 60 MG/1
1 CAPSULE, DELAYED RELEASE ORAL DAILY
Qty: 30 CAPSULE | Refills: 5 | Status: SHIPPED | OUTPATIENT
Start: 2023-04-10

## 2023-04-10 RX ORDER — DULOXETIN HYDROCHLORIDE 60 MG/1
60 CAPSULE, DELAYED RELEASE ORAL DAILY
Qty: 30 CAPSULE | Refills: 5 | Status: SHIPPED | OUTPATIENT
Start: 2023-04-10

## 2023-04-10 RX ORDER — DULOXETIN HYDROCHLORIDE 60 MG/1
60 CAPSULE, DELAYED RELEASE ORAL DAILY
Qty: 30 CAPSULE | Refills: 5 | Status: CANCELLED | OUTPATIENT
Start: 2023-04-10

## 2023-05-08 DIAGNOSIS — M50.30 DDD (DEGENERATIVE DISC DISEASE), CERVICAL: ICD-10-CM

## 2023-05-08 RX ORDER — GABAPENTIN 600 MG/1
600 TABLET ORAL 3 TIMES DAILY
Qty: 90 TABLET | Refills: 0 | Status: SHIPPED | OUTPATIENT
Start: 2023-05-08